# Patient Record
Sex: FEMALE | Race: BLACK OR AFRICAN AMERICAN | Employment: OTHER | URBAN - METROPOLITAN AREA
[De-identification: names, ages, dates, MRNs, and addresses within clinical notes are randomized per-mention and may not be internally consistent; named-entity substitution may affect disease eponyms.]

---

## 2018-09-23 ENCOUNTER — ANESTHESIA EVENT (OUTPATIENT)
Dept: OPERATING ROOM | Age: 68
DRG: 573 | End: 2018-09-23
Payer: MEDICARE

## 2018-09-23 ENCOUNTER — APPOINTMENT (OUTPATIENT)
Dept: GENERAL RADIOLOGY | Age: 68
DRG: 573 | End: 2018-09-23
Payer: MEDICARE

## 2018-09-23 ENCOUNTER — APPOINTMENT (OUTPATIENT)
Dept: ULTRASOUND IMAGING | Age: 68
DRG: 573 | End: 2018-09-23
Payer: MEDICARE

## 2018-09-23 ENCOUNTER — HOSPITAL ENCOUNTER (INPATIENT)
Age: 68
LOS: 12 days | Discharge: HOME HEALTH | DRG: 573 | End: 2018-10-05
Attending: EMERGENCY MEDICINE | Admitting: INTERNAL MEDICINE
Payer: MEDICARE

## 2018-09-23 DIAGNOSIS — E66.01 MORBID OBESITY (HCC): Chronic | ICD-10-CM

## 2018-09-23 DIAGNOSIS — E87.5 HYPERKALEMIA: ICD-10-CM

## 2018-09-23 DIAGNOSIS — S81.809A: ICD-10-CM

## 2018-09-23 DIAGNOSIS — D64.9 ANEMIA, UNSPECIFIED TYPE: ICD-10-CM

## 2018-09-23 DIAGNOSIS — M79.605 BILATERAL LEG PAIN: Primary | ICD-10-CM

## 2018-09-23 DIAGNOSIS — N17.9 AKI (ACUTE KIDNEY INJURY) (HCC): ICD-10-CM

## 2018-09-23 DIAGNOSIS — N17.9 ACUTE RENAL FAILURE, UNSPECIFIED ACUTE RENAL FAILURE TYPE (HCC): ICD-10-CM

## 2018-09-23 DIAGNOSIS — M79.604 BILATERAL LEG PAIN: Primary | ICD-10-CM

## 2018-09-23 DIAGNOSIS — K59.01 SLOW TRANSIT CONSTIPATION: ICD-10-CM

## 2018-09-23 PROBLEM — D50.0 BLOOD LOSS ANEMIA: Status: ACTIVE | Noted: 2018-09-23

## 2018-09-23 LAB
-: ABNORMAL
ABSOLUTE EOS #: 0 K/UL (ref 0–0.4)
ABSOLUTE EOS #: 0 K/UL (ref 0–0.4)
ABSOLUTE IMMATURE GRANULOCYTE: 0 K/UL (ref 0–0.3)
ABSOLUTE IMMATURE GRANULOCYTE: 0.25 K/UL (ref 0–0.3)
ABSOLUTE LYMPH #: 1 K/UL (ref 1–4.8)
ABSOLUTE LYMPH #: 1.6 K/UL (ref 1–4.8)
ABSOLUTE MONO #: 1.26 K/UL (ref 0.1–0.8)
ABSOLUTE MONO #: 1.34 K/UL (ref 0.1–0.8)
ALBUMIN SERPL-MCNC: 2.3 G/DL (ref 3.5–5.2)
ALBUMIN/GLOBULIN RATIO: 0.5 (ref 1–2.5)
ALP BLD-CCNC: 86 U/L (ref 35–104)
ALT SERPL-CCNC: 7 U/L (ref 5–33)
AMORPHOUS: ABNORMAL
ANION GAP SERPL CALCULATED.3IONS-SCNC: 12 MMOL/L (ref 9–17)
ANION GAP SERPL CALCULATED.3IONS-SCNC: 14 MMOL/L (ref 9–17)
ANION GAP SERPL CALCULATED.3IONS-SCNC: 15 MMOL/L (ref 9–17)
AST SERPL-CCNC: 15 U/L
BACTERIA: ABNORMAL
BASOPHILS # BLD: 0 % (ref 0–2)
BASOPHILS # BLD: 1 % (ref 0–2)
BASOPHILS ABSOLUTE: 0 K/UL (ref 0–0.2)
BASOPHILS ABSOLUTE: 0.27 K/UL (ref 0–0.2)
BILIRUB SERPL-MCNC: 0.16 MG/DL (ref 0.3–1.2)
BILIRUBIN URINE: NEGATIVE
BUN BLDV-MCNC: 54 MG/DL (ref 8–23)
BUN BLDV-MCNC: 58 MG/DL (ref 8–23)
BUN BLDV-MCNC: 62 MG/DL (ref 8–23)
BUN/CREAT BLD: ABNORMAL (ref 9–20)
C-REACTIVE PROTEIN: 62.6 MG/L (ref 0–5)
CALCIUM SERPL-MCNC: 7.7 MG/DL (ref 8.6–10.4)
CALCIUM SERPL-MCNC: 7.8 MG/DL (ref 8.6–10.4)
CALCIUM SERPL-MCNC: 8.1 MG/DL (ref 8.6–10.4)
CASTS UA: ABNORMAL /LPF (ref 0–2)
CHLORIDE BLD-SCNC: 101 MMOL/L (ref 98–107)
CHLORIDE BLD-SCNC: 96 MMOL/L (ref 98–107)
CHLORIDE BLD-SCNC: 98 MMOL/L (ref 98–107)
CO2: 14 MMOL/L (ref 20–31)
CO2: 16 MMOL/L (ref 20–31)
CO2: 18 MMOL/L (ref 20–31)
COLOR: YELLOW
COMPLEMENT C3: 124 MG/DL (ref 90–180)
COMPLEMENT C4: 19 MG/DL (ref 10–40)
CORTISOL COLLECTION INFO: ABNORMAL
CORTISOL: 20.5 UG/DL (ref 2.7–18.4)
CREAT SERPL-MCNC: 2.51 MG/DL (ref 0.5–0.9)
CREAT SERPL-MCNC: 3.03 MG/DL (ref 0.5–0.9)
CREAT SERPL-MCNC: 3.34 MG/DL (ref 0.5–0.9)
CREATININE URINE: 213.9 MG/DL (ref 28–217)
CRYSTALS, UA: ABNORMAL /HPF
DIFFERENTIAL TYPE: ABNORMAL
DIFFERENTIAL TYPE: ABNORMAL
EOSINOPHIL,URINE: NORMAL
EOSINOPHILS RELATIVE PERCENT: 0 % (ref 1–4)
EOSINOPHILS RELATIVE PERCENT: 0 % (ref 1–4)
EPITHELIAL CELLS UA: ABNORMAL /HPF (ref 0–5)
FERRITIN: 24 UG/L (ref 13–150)
FERRITIN: 32 UG/L (ref 13–150)
FREE KAPPA/LAMBDA RATIO: 1.29 (ref 0.26–1.65)
GFR AFRICAN AMERICAN: 17 ML/MIN
GFR AFRICAN AMERICAN: 18 ML/MIN
GFR AFRICAN AMERICAN: 23 ML/MIN
GFR NON-AFRICAN AMERICAN: 14 ML/MIN
GFR NON-AFRICAN AMERICAN: 15 ML/MIN
GFR NON-AFRICAN AMERICAN: 19 ML/MIN
GFR SERPL CREATININE-BSD FRML MDRD: ABNORMAL ML/MIN/{1.73_M2}
GLUCOSE BLD-MCNC: 113 MG/DL (ref 70–99)
GLUCOSE BLD-MCNC: 141 MG/DL (ref 70–99)
GLUCOSE BLD-MCNC: 81 MG/DL (ref 70–99)
GLUCOSE BLD-MCNC: 90 MG/DL (ref 65–105)
GLUCOSE URINE: NEGATIVE
HAV IGM SER IA-ACNC: ABNORMAL
HCT VFR BLD CALC: 19.6 % (ref 36.3–47.1)
HCT VFR BLD CALC: 21.1 % (ref 36.3–47.1)
HCT VFR BLD CALC: 23.1 % (ref 36.3–47.1)
HCT VFR BLD CALC: 23.1 % (ref 36.3–47.1)
HEMOGLOBIN: 5.4 G/DL (ref 11.9–15.1)
HEMOGLOBIN: 5.7 G/DL (ref 11.9–15.1)
HEMOGLOBIN: 6.5 G/DL (ref 11.9–15.1)
HEMOGLOBIN: 6.6 G/DL (ref 11.9–15.1)
HEPATITIS B CORE IGM ANTIBODY: NONREACTIVE
HEPATITIS B SURFACE ANTIGEN: NONREACTIVE
HEPATITIS C ANTIBODY: NONREACTIVE
IMMATURE GRANULOCYTES: 0 %
IMMATURE GRANULOCYTES: 1 %
INR BLD: 1
IRON SATURATION: 12 % (ref 20–55)
IRON SATURATION: 6 % (ref 20–55)
IRON: 14 UG/DL (ref 37–145)
IRON: 28 UG/DL (ref 37–145)
KAPPA FREE LIGHT CHAINS QNT: 15.81 MG/DL (ref 0.37–1.94)
KETONES, URINE: ABNORMAL
LACTIC ACID, WHOLE BLOOD: 3.1 MMOL/L (ref 0.7–2.1)
LAMBDA FREE LIGHT CHAINS QNT: 12.24 MG/DL (ref 0.57–2.63)
LEUKOCYTE ESTERASE, URINE: ABNORMAL
LYMPHOCYTES # BLD: 4 % (ref 24–44)
LYMPHOCYTES # BLD: 6 % (ref 24–44)
MCH RBC QN AUTO: 19.7 PG (ref 25.2–33.5)
MCH RBC QN AUTO: 22 PG (ref 25.2–33.5)
MCHC RBC AUTO-ENTMCNC: 27 G/DL (ref 28.4–34.8)
MCHC RBC AUTO-ENTMCNC: 28.1 G/DL (ref 28.4–34.8)
MCV RBC AUTO: 73 FL (ref 82.6–102.9)
MCV RBC AUTO: 78.3 FL (ref 82.6–102.9)
MONOCYTES # BLD: 5 % (ref 1–7)
MONOCYTES # BLD: 5 % (ref 1–7)
MORPHOLOGY: ABNORMAL
MORPHOLOGY: ABNORMAL
MRSA, DNA, NASAL: NORMAL
MUCUS: ABNORMAL
MYOGLOBIN: 2388 NG/ML (ref 25–58)
NITRITE, URINE: NEGATIVE
NRBC AUTOMATED: 0 PER 100 WBC
NRBC AUTOMATED: 0 PER 100 WBC
OSMOLALITY URINE: 485 MOSM/KG (ref 80–1300)
OTHER OBSERVATIONS UA: ABNORMAL
PDW BLD-RTO: 20.5 % (ref 11.8–14.4)
PDW BLD-RTO: 21.5 % (ref 11.8–14.4)
PH UA: 5 (ref 5–8)
PLATELET # BLD: 444 K/UL (ref 138–453)
PLATELET # BLD: ABNORMAL K/UL (ref 138–453)
PLATELET ESTIMATE: ABNORMAL
PLATELET ESTIMATE: ABNORMAL
PLATELET, FLUORESCENCE: 220 K/UL (ref 138–453)
PLATELET, IMMATURE FRACTION: 4.4 % (ref 1.1–10.3)
PMV BLD AUTO: 9.6 FL (ref 8.1–13.5)
PMV BLD AUTO: ABNORMAL FL (ref 8.1–13.5)
POTASSIUM SERPL-SCNC: 5.5 MMOL/L (ref 3.7–5.3)
POTASSIUM SERPL-SCNC: 6.4 MMOL/L (ref 3.7–5.3)
POTASSIUM SERPL-SCNC: 6.8 MMOL/L (ref 3.7–5.3)
PROTEIN UA: NEGATIVE
PROTHROMBIN TIME: 10.8 SEC (ref 9–12)
RBC # BLD: 2.89 M/UL (ref 3.95–5.11)
RBC # BLD: 2.95 M/UL (ref 3.95–5.11)
RBC # BLD: ABNORMAL 10*6/UL
RBC # BLD: ABNORMAL 10*6/UL
RBC UA: ABNORMAL /HPF (ref 0–2)
RENAL EPITHELIAL, UA: ABNORMAL /HPF
SEG NEUTROPHILS: 88 % (ref 36–66)
SEG NEUTROPHILS: 90 % (ref 36–66)
SEGMENTED NEUTROPHILS ABSOLUTE COUNT: 22.59 K/UL (ref 1.8–7.7)
SEGMENTED NEUTROPHILS ABSOLUTE COUNT: 23.49 K/UL (ref 1.8–7.7)
SERUM OSMOLALITY: 302 MOSM/KG (ref 275–295)
SODIUM BLD-SCNC: 124 MMOL/L (ref 135–144)
SODIUM BLD-SCNC: 130 MMOL/L (ref 135–144)
SODIUM BLD-SCNC: 130 MMOL/L (ref 135–144)
SODIUM BLD-SCNC: 134 MMOL/L (ref 135–144)
SODIUM,UR: <20 MMOL/L
SPECIFIC GRAVITY UA: 1.02 (ref 1–1.03)
SPECIMEN DESCRIPTION: NORMAL
THYROXINE, FREE: 0.92 NG/DL (ref 0.93–1.7)
TOTAL CK: 307 U/L (ref 26–192)
TOTAL IRON BINDING CAPACITY: 228 UG/DL (ref 250–450)
TOTAL IRON BINDING CAPACITY: 229 UG/DL (ref 250–450)
TOTAL PROTEIN, URINE: 19 MG/DL
TOTAL PROTEIN: 6.9 G/DL (ref 6.4–8.3)
TRICHOMONAS: ABNORMAL
TROPONIN INTERP: NORMAL
TROPONIN INTERP: NORMAL
TROPONIN T: <0.03 NG/ML
TROPONIN T: <0.03 NG/ML
TSH SERPL DL<=0.05 MIU/L-ACNC: 5.53 MIU/L (ref 0.3–5)
TURBIDITY: CLEAR
UNSATURATED IRON BINDING CAPACITY: 201 UG/DL (ref 112–347)
UNSATURATED IRON BINDING CAPACITY: 214 UG/DL (ref 112–347)
URINE HGB: NEGATIVE
UROBILINOGEN, URINE: NORMAL
WBC # BLD: 25.1 K/UL (ref 3.5–11.3)
WBC # BLD: 26.7 K/UL (ref 3.5–11.3)
WBC # BLD: ABNORMAL 10*3/UL
WBC # BLD: ABNORMAL 10*3/UL
WBC UA: ABNORMAL /HPF (ref 0–5)
YEAST: ABNORMAL

## 2018-09-23 PROCEDURE — 2580000003 HC RX 258: Performed by: INTERNAL MEDICINE

## 2018-09-23 PROCEDURE — 93005 ELECTROCARDIOGRAM TRACING: CPT

## 2018-09-23 PROCEDURE — 99223 1ST HOSP IP/OBS HIGH 75: CPT | Performed by: INTERNAL MEDICINE

## 2018-09-23 PROCEDURE — 81001 URINALYSIS AUTO W/SCOPE: CPT

## 2018-09-23 PROCEDURE — 82570 ASSAY OF URINE CREATININE: CPT

## 2018-09-23 PROCEDURE — 6370000000 HC RX 637 (ALT 250 FOR IP): Performed by: STUDENT IN AN ORGANIZED HEALTH CARE EDUCATION/TRAINING PROGRAM

## 2018-09-23 PROCEDURE — 86140 C-REACTIVE PROTEIN: CPT

## 2018-09-23 PROCEDURE — 6360000002 HC RX W HCPCS

## 2018-09-23 PROCEDURE — 85014 HEMATOCRIT: CPT

## 2018-09-23 PROCEDURE — 82947 ASSAY GLUCOSE BLOOD QUANT: CPT

## 2018-09-23 PROCEDURE — 80053 COMPREHEN METABOLIC PANEL: CPT

## 2018-09-23 PROCEDURE — 99291 CRITICAL CARE FIRST HOUR: CPT | Performed by: INTERNAL MEDICINE

## 2018-09-23 PROCEDURE — 86850 RBC ANTIBODY SCREEN: CPT

## 2018-09-23 PROCEDURE — 86335 IMMUNFIX E-PHORSIS/URINE/CSF: CPT

## 2018-09-23 PROCEDURE — 2500000003 HC RX 250 WO HCPCS: Performed by: HOSPITALIST

## 2018-09-23 PROCEDURE — 2580000003 HC RX 258: Performed by: NURSE PRACTITIONER

## 2018-09-23 PROCEDURE — 2580000003 HC RX 258: Performed by: STUDENT IN AN ORGANIZED HEALTH CARE EDUCATION/TRAINING PROGRAM

## 2018-09-23 PROCEDURE — 96375 TX/PRO/DX INJ NEW DRUG ADDON: CPT

## 2018-09-23 PROCEDURE — 94762 N-INVAS EAR/PLS OXIMTRY CONT: CPT

## 2018-09-23 PROCEDURE — P9016 RBC LEUKOCYTES REDUCED: HCPCS

## 2018-09-23 PROCEDURE — 96374 THER/PROPH/DIAG INJ IV PUSH: CPT

## 2018-09-23 PROCEDURE — 82728 ASSAY OF FERRITIN: CPT

## 2018-09-23 PROCEDURE — 6360000002 HC RX W HCPCS: Performed by: NURSE PRACTITIONER

## 2018-09-23 PROCEDURE — 83605 ASSAY OF LACTIC ACID: CPT

## 2018-09-23 PROCEDURE — 73590 X-RAY EXAM OF LOWER LEG: CPT

## 2018-09-23 PROCEDURE — 83935 ASSAY OF URINE OSMOLALITY: CPT

## 2018-09-23 PROCEDURE — 36430 TRANSFUSION BLD/BLD COMPNT: CPT

## 2018-09-23 PROCEDURE — 87040 BLOOD CULTURE FOR BACTERIA: CPT

## 2018-09-23 PROCEDURE — 83930 ASSAY OF BLOOD OSMOLALITY: CPT

## 2018-09-23 PROCEDURE — 86900 BLOOD TYPING SEROLOGIC ABO: CPT

## 2018-09-23 PROCEDURE — 94760 N-INVAS EAR/PLS OXIMETRY 1: CPT

## 2018-09-23 PROCEDURE — 83883 ASSAY NEPHELOMETRY NOT SPEC: CPT

## 2018-09-23 PROCEDURE — 51702 INSERT TEMP BLADDER CATH: CPT

## 2018-09-23 PROCEDURE — 2000000000 HC ICU R&B

## 2018-09-23 PROCEDURE — 6360000002 HC RX W HCPCS: Performed by: HOSPITALIST

## 2018-09-23 PROCEDURE — 84484 ASSAY OF TROPONIN QUANT: CPT

## 2018-09-23 PROCEDURE — 80074 ACUTE HEPATITIS PANEL: CPT

## 2018-09-23 PROCEDURE — 82533 TOTAL CORTISOL: CPT

## 2018-09-23 PROCEDURE — 84443 ASSAY THYROID STIM HORMONE: CPT

## 2018-09-23 PROCEDURE — 84300 ASSAY OF URINE SODIUM: CPT

## 2018-09-23 PROCEDURE — 36415 COLL VENOUS BLD VENIPUNCTURE: CPT

## 2018-09-23 PROCEDURE — 71045 X-RAY EXAM CHEST 1 VIEW: CPT

## 2018-09-23 PROCEDURE — 86334 IMMUNOFIX E-PHORESIS SERUM: CPT

## 2018-09-23 PROCEDURE — 84156 ASSAY OF PROTEIN URINE: CPT

## 2018-09-23 PROCEDURE — 84439 ASSAY OF FREE THYROXINE: CPT

## 2018-09-23 PROCEDURE — 83516 IMMUNOASSAY NONANTIBODY: CPT

## 2018-09-23 PROCEDURE — 99222 1ST HOSP IP/OBS MODERATE 55: CPT | Performed by: INTERNAL MEDICINE

## 2018-09-23 PROCEDURE — 86901 BLOOD TYPING SEROLOGIC RH(D): CPT

## 2018-09-23 PROCEDURE — 6360000002 HC RX W HCPCS: Performed by: STUDENT IN AN ORGANIZED HEALTH CARE EDUCATION/TRAINING PROGRAM

## 2018-09-23 PROCEDURE — 99285 EMERGENCY DEPT VISIT HI MDM: CPT

## 2018-09-23 PROCEDURE — 83540 ASSAY OF IRON: CPT

## 2018-09-23 PROCEDURE — 85025 COMPLETE CBC W/AUTO DIFF WBC: CPT

## 2018-09-23 PROCEDURE — 87641 MR-STAPH DNA AMP PROBE: CPT

## 2018-09-23 PROCEDURE — 85610 PROTHROMBIN TIME: CPT

## 2018-09-23 PROCEDURE — 83874 ASSAY OF MYOGLOBIN: CPT

## 2018-09-23 PROCEDURE — 82550 ASSAY OF CK (CPK): CPT

## 2018-09-23 PROCEDURE — 83550 IRON BINDING TEST: CPT

## 2018-09-23 PROCEDURE — 6370000000 HC RX 637 (ALT 250 FOR IP): Performed by: INTERNAL MEDICINE

## 2018-09-23 PROCEDURE — C9113 INJ PANTOPRAZOLE SODIUM, VIA: HCPCS | Performed by: NURSE PRACTITIONER

## 2018-09-23 PROCEDURE — 76770 US EXAM ABDO BACK WALL COMP: CPT

## 2018-09-23 PROCEDURE — 86038 ANTINUCLEAR ANTIBODIES: CPT

## 2018-09-23 PROCEDURE — 86160 COMPLEMENT ANTIGEN: CPT

## 2018-09-23 PROCEDURE — 2580000003 HC RX 258: Performed by: HOSPITALIST

## 2018-09-23 PROCEDURE — 99222 1ST HOSP IP/OBS MODERATE 55: CPT | Performed by: SURGERY

## 2018-09-23 PROCEDURE — 80048 BASIC METABOLIC PNL TOTAL CA: CPT

## 2018-09-23 PROCEDURE — 86920 COMPATIBILITY TEST SPIN: CPT

## 2018-09-23 PROCEDURE — 2500000003 HC RX 250 WO HCPCS: Performed by: INTERNAL MEDICINE

## 2018-09-23 PROCEDURE — 85018 HEMOGLOBIN: CPT

## 2018-09-23 PROCEDURE — 85055 RETICULATED PLATELET ASSAY: CPT

## 2018-09-23 PROCEDURE — 84295 ASSAY OF SERUM SODIUM: CPT

## 2018-09-23 PROCEDURE — 87205 SMEAR GRAM STAIN: CPT

## 2018-09-23 RX ORDER — DEXTROSE MONOHYDRATE 25 G/50ML
25 INJECTION, SOLUTION INTRAVENOUS PRN
Status: DISCONTINUED | OUTPATIENT
Start: 2018-09-23 | End: 2018-10-05 | Stop reason: HOSPADM

## 2018-09-23 RX ORDER — SODIUM CHLORIDE 0.9 % (FLUSH) 0.9 %
10 SYRINGE (ML) INJECTION EVERY 12 HOURS
Status: DISCONTINUED | OUTPATIENT
Start: 2018-09-23 | End: 2018-09-23

## 2018-09-23 RX ORDER — PANTOPRAZOLE SODIUM 40 MG/10ML
40 INJECTION, POWDER, LYOPHILIZED, FOR SOLUTION INTRAVENOUS 2 TIMES DAILY
Status: DISCONTINUED | OUTPATIENT
Start: 2018-09-23 | End: 2018-10-05 | Stop reason: HOSPADM

## 2018-09-23 RX ORDER — DEXTROSE MONOHYDRATE 25 G/50ML
25 INJECTION, SOLUTION INTRAVENOUS ONCE
Status: DISCONTINUED | OUTPATIENT
Start: 2018-09-23 | End: 2018-09-23

## 2018-09-23 RX ORDER — DOCUSATE SODIUM 100 MG/1
100 CAPSULE, LIQUID FILLED ORAL 2 TIMES DAILY
Status: DISCONTINUED | OUTPATIENT
Start: 2018-09-23 | End: 2018-09-23

## 2018-09-23 RX ORDER — FENTANYL CITRATE 50 UG/ML
100 INJECTION, SOLUTION INTRAMUSCULAR; INTRAVENOUS
Status: DISCONTINUED | OUTPATIENT
Start: 2018-09-23 | End: 2018-09-24

## 2018-09-23 RX ORDER — 0.9 % SODIUM CHLORIDE 0.9 %
250 INTRAVENOUS SOLUTION INTRAVENOUS ONCE
Status: DISCONTINUED | OUTPATIENT
Start: 2018-09-23 | End: 2018-09-23

## 2018-09-23 RX ORDER — GABAPENTIN 300 MG/1
300 CAPSULE ORAL 3 TIMES DAILY
Status: DISCONTINUED | OUTPATIENT
Start: 2018-09-23 | End: 2018-09-23

## 2018-09-23 RX ORDER — DEXTROSE MONOHYDRATE 25 G/50ML
25 INJECTION, SOLUTION INTRAVENOUS ONCE
Status: COMPLETED | OUTPATIENT
Start: 2018-09-23 | End: 2018-09-23

## 2018-09-23 RX ORDER — SODIUM CHLORIDE 9 MG/ML
INJECTION, SOLUTION INTRAVENOUS CONTINUOUS
Status: DISCONTINUED | OUTPATIENT
Start: 2018-09-23 | End: 2018-09-23

## 2018-09-23 RX ORDER — ACETAMINOPHEN 325 MG/1
650 TABLET ORAL EVERY 4 HOURS PRN
Status: DISCONTINUED | OUTPATIENT
Start: 2018-09-23 | End: 2018-10-05 | Stop reason: HOSPADM

## 2018-09-23 RX ORDER — SODIUM CHLORIDE, SODIUM LACTATE, POTASSIUM CHLORIDE, AND CALCIUM CHLORIDE .6; .31; .03; .02 G/100ML; G/100ML; G/100ML; G/100ML
1000 INJECTION, SOLUTION INTRAVENOUS ONCE
Status: COMPLETED | OUTPATIENT
Start: 2018-09-23 | End: 2018-09-23

## 2018-09-23 RX ORDER — DEXTROSE MONOHYDRATE 50 MG/ML
100 INJECTION, SOLUTION INTRAVENOUS PRN
Status: DISCONTINUED | OUTPATIENT
Start: 2018-09-23 | End: 2018-10-05 | Stop reason: HOSPADM

## 2018-09-23 RX ORDER — SODIUM CHLORIDE 0.9 % (FLUSH) 0.9 %
10 SYRINGE (ML) INJECTION PRN
Status: DISCONTINUED | OUTPATIENT
Start: 2018-09-23 | End: 2018-09-23

## 2018-09-23 RX ORDER — 0.9 % SODIUM CHLORIDE 0.9 %
10 VIAL (ML) INJECTION DAILY
Status: DISCONTINUED | OUTPATIENT
Start: 2018-09-23 | End: 2018-10-05 | Stop reason: HOSPADM

## 2018-09-23 RX ORDER — MORPHINE SULFATE 4 MG/ML
4 INJECTION, SOLUTION INTRAMUSCULAR; INTRAVENOUS ONCE
Status: COMPLETED | OUTPATIENT
Start: 2018-09-23 | End: 2018-09-23

## 2018-09-23 RX ORDER — NIFEDIPINE 60 MG/1
60 TABLET, FILM COATED, EXTENDED RELEASE ORAL DAILY
Status: DISCONTINUED | OUTPATIENT
Start: 2018-09-23 | End: 2018-09-23

## 2018-09-23 RX ORDER — NICOTINE POLACRILEX 4 MG
15 LOZENGE BUCCAL PRN
Status: DISCONTINUED | OUTPATIENT
Start: 2018-09-23 | End: 2018-10-05 | Stop reason: HOSPADM

## 2018-09-23 RX ORDER — CALCIUM GLUCONATE 94 MG/ML
1 INJECTION, SOLUTION INTRAVENOUS ONCE
Status: DISCONTINUED | OUTPATIENT
Start: 2018-09-23 | End: 2018-10-05 | Stop reason: HOSPADM

## 2018-09-23 RX ORDER — DEXTROSE MONOHYDRATE 25 G/50ML
12.5 INJECTION, SOLUTION INTRAVENOUS PRN
Status: DISCONTINUED | OUTPATIENT
Start: 2018-09-23 | End: 2018-10-05 | Stop reason: HOSPADM

## 2018-09-23 RX ORDER — CALCIUM GLUCONATE 94 MG/ML
1 INJECTION, SOLUTION INTRAVENOUS ONCE
Status: COMPLETED | OUTPATIENT
Start: 2018-09-23 | End: 2018-09-23

## 2018-09-23 RX ORDER — SODIUM POLYSTYRENE SULFONATE 15 G/60ML
15 SUSPENSION ORAL; RECTAL
Status: DISCONTINUED | OUTPATIENT
Start: 2018-09-23 | End: 2018-09-23

## 2018-09-23 RX ORDER — MORPHINE SULFATE 4 MG/ML
INJECTION, SOLUTION INTRAMUSCULAR; INTRAVENOUS
Status: COMPLETED
Start: 2018-09-23 | End: 2018-09-23

## 2018-09-23 RX ORDER — CARVEDILOL 12.5 MG/1
12.5 TABLET ORAL 2 TIMES DAILY WITH MEALS
Status: DISCONTINUED | OUTPATIENT
Start: 2018-09-23 | End: 2018-09-23

## 2018-09-23 RX ORDER — NICOTINE 21 MG/24HR
1 PATCH, TRANSDERMAL 24 HOURS TRANSDERMAL DAILY PRN
Status: DISCONTINUED | OUTPATIENT
Start: 2018-09-23 | End: 2018-09-26

## 2018-09-23 RX ORDER — ONDANSETRON 2 MG/ML
4 INJECTION INTRAMUSCULAR; INTRAVENOUS EVERY 6 HOURS PRN
Status: DISCONTINUED | OUTPATIENT
Start: 2018-09-23 | End: 2018-10-05 | Stop reason: HOSPADM

## 2018-09-23 RX ORDER — FAMOTIDINE 20 MG/1
20 TABLET, FILM COATED ORAL DAILY
Status: DISCONTINUED | OUTPATIENT
Start: 2018-09-23 | End: 2018-09-23

## 2018-09-23 RX ORDER — FENTANYL CITRATE 50 UG/ML
50 INJECTION, SOLUTION INTRAMUSCULAR; INTRAVENOUS ONCE
Status: COMPLETED | OUTPATIENT
Start: 2018-09-23 | End: 2018-09-23

## 2018-09-23 RX ORDER — FENTANYL CITRATE 50 UG/ML
50 INJECTION, SOLUTION INTRAMUSCULAR; INTRAVENOUS
Status: DISCONTINUED | OUTPATIENT
Start: 2018-09-23 | End: 2018-09-24

## 2018-09-23 RX ORDER — SODIUM POLYSTYRENE SULFONATE 15 G/60ML
30 SUSPENSION ORAL; RECTAL
Status: DISCONTINUED | OUTPATIENT
Start: 2018-09-23 | End: 2018-09-23

## 2018-09-23 RX ORDER — NICOTINE POLACRILEX 4 MG
15 LOZENGE BUCCAL PRN
Status: DISCONTINUED | OUTPATIENT
Start: 2018-09-23 | End: 2018-09-23 | Stop reason: SDUPTHER

## 2018-09-23 RX ORDER — 0.9 % SODIUM CHLORIDE 0.9 %
250 INTRAVENOUS SOLUTION INTRAVENOUS ONCE
Status: COMPLETED | OUTPATIENT
Start: 2018-09-23 | End: 2018-09-24

## 2018-09-23 RX ORDER — ASCORBIC ACID 500 MG
500 TABLET ORAL DAILY
Status: DISCONTINUED | OUTPATIENT
Start: 2018-09-23 | End: 2018-10-05 | Stop reason: HOSPADM

## 2018-09-23 RX ORDER — CALCIUM GLUCONATE 94 MG/ML
1 INJECTION, SOLUTION INTRAVENOUS ONCE
Status: DISCONTINUED | OUTPATIENT
Start: 2018-09-23 | End: 2018-09-23

## 2018-09-23 RX ADMIN — SODIUM BICARBONATE: 84 INJECTION, SOLUTION INTRAVENOUS at 10:48

## 2018-09-23 RX ADMIN — SODIUM CHLORIDE 250 ML: 9 INJECTION, SOLUTION INTRAVENOUS at 21:46

## 2018-09-23 RX ADMIN — DEXTROSE MONOHYDRATE 25 G: 500 INJECTION PARENTERAL at 14:00

## 2018-09-23 RX ADMIN — FENTANYL CITRATE 100 MCG: 50 INJECTION, SOLUTION INTRAMUSCULAR; INTRAVENOUS at 23:33

## 2018-09-23 RX ADMIN — VANCOMYCIN HYDROCHLORIDE 1750 MG: 10 INJECTION, POWDER, LYOPHILIZED, FOR SOLUTION INTRAVENOUS at 07:00

## 2018-09-23 RX ADMIN — PIPERACILLIN AND TAZOBACTAM 3.38 G: 3; .375 INJECTION, POWDER, LYOPHILIZED, FOR SOLUTION INTRAVENOUS; PARENTERAL at 20:41

## 2018-09-23 RX ADMIN — MORPHINE SULFATE 4 MG: 4 INJECTION, SOLUTION INTRAMUSCULAR; INTRAVENOUS at 10:11

## 2018-09-23 RX ADMIN — Medication: at 12:59

## 2018-09-23 RX ADMIN — MORPHINE SULFATE 4 MG: 4 INJECTION INTRAVENOUS at 02:25

## 2018-09-23 RX ADMIN — FENTANYL CITRATE 100 MCG: 50 INJECTION, SOLUTION INTRAMUSCULAR; INTRAVENOUS at 12:44

## 2018-09-23 RX ADMIN — IRON SUCROSE 200 MG: 20 INJECTION, SOLUTION INTRAVENOUS at 23:16

## 2018-09-23 RX ADMIN — FENTANYL CITRATE 100 MCG: 50 INJECTION, SOLUTION INTRAMUSCULAR; INTRAVENOUS at 19:49

## 2018-09-23 RX ADMIN — SODIUM CHLORIDE 500 ML: 4.5 INJECTION, SOLUTION INTRAVENOUS at 13:02

## 2018-09-23 RX ADMIN — PANTOPRAZOLE SODIUM 40 MG: 40 INJECTION, POWDER, FOR SOLUTION INTRAVENOUS at 12:00

## 2018-09-23 RX ADMIN — CALCIUM GLUCONATE 1 G: 98 INJECTION, SOLUTION INTRAVENOUS at 06:16

## 2018-09-23 RX ADMIN — FENTANYL CITRATE 100 MCG: 50 INJECTION, SOLUTION INTRAMUSCULAR; INTRAVENOUS at 15:04

## 2018-09-23 RX ADMIN — INSULIN HUMAN 10 UNITS: 100 INJECTION, SOLUTION PARENTERAL at 14:01

## 2018-09-23 RX ADMIN — MORPHINE SULFATE 4 MG: 4 INJECTION, SOLUTION INTRAMUSCULAR; INTRAVENOUS at 10:14

## 2018-09-23 RX ADMIN — SODIUM CHLORIDE, POTASSIUM CHLORIDE, SODIUM LACTATE AND CALCIUM CHLORIDE 1000 ML: 600; 310; 30; 20 INJECTION, SOLUTION INTRAVENOUS at 05:05

## 2018-09-23 RX ADMIN — SODIUM BICARBONATE 50 MEQ: 84 INJECTION, SOLUTION INTRAVENOUS at 13:06

## 2018-09-23 RX ADMIN — FENTANYL CITRATE 100 MCG: 50 INJECTION, SOLUTION INTRAMUSCULAR; INTRAVENOUS at 21:54

## 2018-09-23 RX ADMIN — DEXTROSE MONOHYDRATE 25 G: 25 INJECTION, SOLUTION INTRAVENOUS at 05:30

## 2018-09-23 RX ADMIN — FENTANYL CITRATE 100 MCG: 50 INJECTION, SOLUTION INTRAMUSCULAR; INTRAVENOUS at 11:22

## 2018-09-23 RX ADMIN — PANTOPRAZOLE SODIUM 40 MG: 40 INJECTION, POWDER, FOR SOLUTION INTRAVENOUS at 20:41

## 2018-09-23 RX ADMIN — Medication: at 20:40

## 2018-09-23 RX ADMIN — INSULIN HUMAN 10 UNITS: 100 INJECTION, SOLUTION PARENTERAL at 23:11

## 2018-09-23 RX ADMIN — PIPERACILLIN AND TAZOBACTAM 3.38 G: 3; .375 INJECTION, POWDER, LYOPHILIZED, FOR SOLUTION INTRAVENOUS; PARENTERAL at 14:05

## 2018-09-23 RX ADMIN — Medication 10 ML: at 12:00

## 2018-09-23 RX ADMIN — Medication: at 12:34

## 2018-09-23 RX ADMIN — FENTANYL CITRATE 50 MCG: 50 INJECTION INTRAMUSCULAR; INTRAVENOUS at 03:47

## 2018-09-23 RX ADMIN — DEXTROSE MONOHYDRATE 25 G: 500 INJECTION PARENTERAL at 23:11

## 2018-09-23 RX ADMIN — INSULIN HUMAN 10 UNITS: 100 INJECTION, SOLUTION PARENTERAL at 05:29

## 2018-09-23 ASSESSMENT — ENCOUNTER SYMPTOMS
SHORTNESS OF BREATH: 0
ROS SKIN COMMENTS: BILATERAL LOWER EXTREMITY WOUNDS
NAUSEA: 0
EYE REDNESS: 0
EYE DISCHARGE: 0
ABDOMINAL PAIN: 0
BLOOD IN STOOL: 0
CHEST TIGHTNESS: 0
COLOR CHANGE: 0
VOMITING: 0

## 2018-09-23 ASSESSMENT — PAIN SCALES - GENERAL
PAINLEVEL_OUTOF10: 10
PAINLEVEL_OUTOF10: 8
PAINLEVEL_OUTOF10: 10
PAINLEVEL_OUTOF10: 10

## 2018-09-23 ASSESSMENT — PAIN DESCRIPTION - LOCATION
LOCATION: LEG
LOCATION: LEG

## 2018-09-23 ASSESSMENT — PAIN DESCRIPTION - ORIENTATION
ORIENTATION: LEFT;RIGHT;LOWER
ORIENTATION: RIGHT;LEFT;LOWER

## 2018-09-23 ASSESSMENT — PAIN DESCRIPTION - DESCRIPTORS
DESCRIPTORS: BURNING;SHARP
DESCRIPTORS: BURNING;SHARP

## 2018-09-23 ASSESSMENT — PAIN DESCRIPTION - PROGRESSION
CLINICAL_PROGRESSION: NOT CHANGED
CLINICAL_PROGRESSION: GRADUALLY WORSENING

## 2018-09-23 ASSESSMENT — PAIN DESCRIPTION - ONSET: ONSET: ON-GOING

## 2018-09-23 ASSESSMENT — PAIN DESCRIPTION - PAIN TYPE
TYPE: ACUTE PAIN;CHRONIC PAIN
TYPE: ACUTE PAIN;CHRONIC PAIN

## 2018-09-23 ASSESSMENT — PAIN DESCRIPTION - FREQUENCY
FREQUENCY: CONTINUOUS
FREQUENCY: CONTINUOUS

## 2018-09-23 NOTE — CONSULTS
Patient is . She lives with family. She occasionally  puffs a cigarette. She  used to smoke heavily before. TOBACCO:   reports that she has quit smoking. She has quit using smokeless tobacco.  ETOH:   reports that she does not drink alcohol. DRUGS:   reports that she does not use drugs. Environmental/chemical exposure:  Insect bites while working in yard couple months ago. Travel History:. No travel history. Family History:   Family History   Problem Relation Age of Onset    Adopted: Yes    Cancer Mother        REVIEW OF SYSTEMS:      Limited as he is quite drowsy after Fentanyl. No fever but she did have some chills.  + Generalized weakness and nausea but no vomiting or abdominal pain. No report of headache, neck pain or chest pain. No vaginal discharge or any urinary complaints. She now has a Keyes catheter. PHYSICAL EXAM:      Vitals:      Patient Vitals for the past 24 hrs:   BP Temp Temp src Pulse Resp SpO2 Height Weight   09/23/18 0900 (!) 142/28 - - 81 22 100 % - -   09/23/18 0848 (!) 127/38 - - 83 20 - 5' 3.5\" (1.613 m) (!) 306 lb 3.5 oz (138.9 kg)   09/23/18 0845 (!) 78/55 - - 79 20 100 % - -   09/23/18 0830 (!) 118/33 - - 75 20 90 % - -   09/23/18 0815 - - - - - 99 % - -   09/23/18 0808 (!) 124/32 97.6 °F (36.4 °C) Oral 84 23 100 % 5' 3.5\" (1.613 m) (!) 307 lb 15.7 oz (139.7 kg)   09/23/18 0717 (!) 148/44 98 °F (36.7 °C) - 90 21 100 % - -   09/23/18 0713 (!) 147/35 98 °F (36.7 °C) - 86 21 - - -   09/23/18 0531 (!) 169/39 - - 86 20 - - -   09/23/18 0457 - - - - - - - 270 lb (122.5 kg)   09/23/18 0406 (!) 143/36 - - 79 24 99 % - -   09/23/18 0256 107/78 - - 82 - - - -   09/23/18 0146 (!) 106/59 - - 96 - - - -   09/23/18 0143 (!) 127/43 - - - - - - -   09/23/18 0136 - - - 99 20 100 % - -   09/23/18 0131 - 97.7 °F (36.5 °C) Oral - - - - -         General appearance:Quite lethargic, elderly female. She just received  Fentanyl. She is hemodynamically stable.   Granddaughter and

## 2018-09-23 NOTE — ED TRIAGE NOTES
Pt presents to ED w/ CO bilat leg wounds. Leg wounds are on lower legs, appear extremely infected with pus and blood oozing from legs. Pt states this has been going on for a month, states pain is extreme, and she has not been seen by any doctors. Pt has been putting peroxide on her wounds and taking motrin. Pt alert and oriented x4. Pt has random, intermittent, shking fits that she states are from the pain. Family states she does this at home as well.

## 2018-09-23 NOTE — H&P
recommendations, check renal ultrasound  6. Lactic acidosis, we will monitor the trend  7. She was started on vancomycin and Zosyn in the emergency department, we will keep her on that and follow cultures, closely monitoring of Vanco trough with acute kidney injury  8. GI evaluation will also be obtained  9. Given the patient's current condition, discussed with ICU resident and we will transfer the patient to the ICU. Plan of care discussed with ICU resident. Consultations:   IP CONSULT TO HOSPITALIST  IP CONSULT TO INFECTIOUS DISEASES  IP CONSULT TO NEPHROLOGY  PHARMACY TO DOSE VANCOMYCIN  IP CONSULT TO VASCULAR SURGERY     Patient is admitted as inpatient status because of co-morbidities listed above, severity of signs and symptoms as outlined, requirement for current medical therapies and most importantly because of direct risk to patient if care not provided in a hospital setting. Pedro Winn MD  9/23/2018  9:14 AM    Copy sent to Dr. Shine primary care provider on file.

## 2018-09-23 NOTE — CONSULTS
[facility]  Consultation Note     . REASON FOR CONSULTATION:    Hematachezia secondary to long term NSAID use  Melena, anemia    HISTORY OF PRESENT ILLNESS:     This is a 77 y.o. female who presents with non-heaing BLE wounds secondary to insect bites that have been present for months. Pt is a very poor/unreliable historian stating she has 2 names and 2 dates of birth. She states she does not have a pcp or receive regular medical care. Pt states that she has been taking 6 Aleve daily for years for pain, has had hematochezia and melena intermittently \"for a long time\"    Pt c/o dull, constant epigastric pain with no alleviating or aggravating factors. Pt states that she use to weight over 600lbs, and over the last few months has had a decrease in appetite and lost an unknown amount of weight. Upon arrival, her Hgb was significantly low at 5.7 and 5.4-she is currently receiving blood and is started on a bicarb drip. Due to limited IV access at the time, a PPI drip cannot be started-so IV push will be given. Nephrology was consulted for SAM with possible hemo to be started. GS was consulted to manage BLE wounds which may require surgical debridement. IV antibiotics were initiated. PAST MEDICAL HISTORY:  Past Medical History:   Diagnosis Date    SAM (acute kidney injury) ([facility] [address] 9/23/2018    Heart attack ([facility] [address] 1982    Skin lesions     bilateral legs     Past Surgical History:   Procedure Laterality Date    TUBAL LIGATION  1974       ALLERGIES:  Allergies   Allergen Reactions    Fruit & Vegetable Daily [Nutritional Supplements] Anaphylaxis     After ingesting nectarines    Aspirin Nausea And Vomiting       HOME MEDICATIONS:  Prior to Admission medications    Medication Sig Start Date End Date Taking?  Authorizing Provider   Ascorbic Acid (VITAMIN C) 500 MG tablet Take 1 tablet by mouth daily 5/11/15   Peter Leiva MD   gabapentin (NEURONTIN) 300 MG capsule Take 1 since admission. Hgb upon arrival was 5.7 and 5.4-pt not in distress    -Once stable, Pt will need EGD and colonoscopy-appropriate time to be determined  -Rec PPI drip once access/compatibility is obtained. Until then PPI IV BID  -Monitor for s/s of GI blood loss.    -Trend Hgb and transfuse to maintain Hgb>7    This plan was formulated in collaboration with Dr. Trang Nieves MD    Electronically signed by:  Cisco Carrillo CNP, [facility]  [phone]  9/23/2018    11:01 AM

## 2018-09-24 ENCOUNTER — ANESTHESIA (OUTPATIENT)
Dept: OPERATING ROOM | Age: 68
DRG: 573 | End: 2018-09-24
Payer: MEDICARE

## 2018-09-24 ENCOUNTER — APPOINTMENT (OUTPATIENT)
Dept: ULTRASOUND IMAGING | Age: 68
DRG: 573 | End: 2018-09-24
Payer: MEDICARE

## 2018-09-24 VITALS — SYSTOLIC BLOOD PRESSURE: 140 MMHG | TEMPERATURE: 95.8 F | OXYGEN SATURATION: 100 % | DIASTOLIC BLOOD PRESSURE: 64 MMHG

## 2018-09-24 PROBLEM — E83.51 HYPOCALCEMIA: Status: ACTIVE | Noted: 2018-09-24

## 2018-09-24 PROBLEM — E88.09 HYPOALBUMINEMIA: Status: ACTIVE | Noted: 2018-09-24

## 2018-09-24 LAB
ABSOLUTE EOS #: 0.21 K/UL (ref 0–0.4)
ABSOLUTE IMMATURE GRANULOCYTE: 0 K/UL (ref 0–0.3)
ABSOLUTE LYMPH #: 0.83 K/UL (ref 1–4.8)
ABSOLUTE MONO #: 1.66 K/UL (ref 0.1–0.8)
ALBUMIN SERPL-MCNC: 2.3 G/DL (ref 3.5–5.2)
ALBUMIN/GLOBULIN RATIO: 0.5 (ref 1–2.5)
ALP BLD-CCNC: 80 U/L (ref 35–104)
ALT SERPL-CCNC: 18 U/L (ref 5–33)
ANION GAP SERPL CALCULATED.3IONS-SCNC: 10 MMOL/L (ref 9–17)
ANTI-NUCLEAR ANTIBODY (ANA): NEGATIVE
AST SERPL-CCNC: 85 U/L
BASOPHILS # BLD: 0 % (ref 0–2)
BASOPHILS ABSOLUTE: 0 K/UL (ref 0–0.2)
BILIRUB SERPL-MCNC: 0.5 MG/DL (ref 0.3–1.2)
BUN BLDV-MCNC: 45 MG/DL (ref 8–23)
BUN/CREAT BLD: ABNORMAL (ref 9–20)
CALCIUM IONIZED: 1.02 MMOL/L (ref 1.13–1.33)
CALCIUM SERPL-MCNC: 8 MG/DL (ref 8.6–10.4)
CHLORIDE BLD-SCNC: 104 MMOL/L (ref 98–107)
CO2: 21 MMOL/L (ref 20–31)
CREAT SERPL-MCNC: 2.3 MG/DL (ref 0.5–0.9)
DIFFERENTIAL TYPE: ABNORMAL
EKG ATRIAL RATE: 80 BPM
EKG ATRIAL RATE: 81 BPM
EKG ATRIAL RATE: 84 BPM
EKG ATRIAL RATE: 96 BPM
EKG P AXIS: 36 DEGREES
EKG P AXIS: 41 DEGREES
EKG P AXIS: 41 DEGREES
EKG P AXIS: 48 DEGREES
EKG P-R INTERVAL: 142 MS
EKG P-R INTERVAL: 144 MS
EKG P-R INTERVAL: 148 MS
EKG P-R INTERVAL: 154 MS
EKG Q-T INTERVAL: 350 MS
EKG Q-T INTERVAL: 374 MS
EKG Q-T INTERVAL: 376 MS
EKG Q-T INTERVAL: 378 MS
EKG QRS DURATION: 86 MS
EKG QRS DURATION: 86 MS
EKG QRS DURATION: 88 MS
EKG QRS DURATION: 90 MS
EKG QTC CALCULATION (BAZETT): 434 MS
EKG QTC CALCULATION (BAZETT): 435 MS
EKG QTC CALCULATION (BAZETT): 442 MS
EKG QTC CALCULATION (BAZETT): 444 MS
EKG R AXIS: -10 DEGREES
EKG R AXIS: -14 DEGREES
EKG R AXIS: -15 DEGREES
EKG R AXIS: -9 DEGREES
EKG T AXIS: 153 DEGREES
EKG T AXIS: 155 DEGREES
EKG T AXIS: 157 DEGREES
EKG T AXIS: 161 DEGREES
EKG VENTRICULAR RATE: 80 BPM
EKG VENTRICULAR RATE: 81 BPM
EKG VENTRICULAR RATE: 84 BPM
EKG VENTRICULAR RATE: 96 BPM
EOSINOPHILS RELATIVE PERCENT: 1 % (ref 1–4)
ESTIMATED AVERAGE GLUCOSE: 114 MG/DL
GFR AFRICAN AMERICAN: 25 ML/MIN
GFR NON-AFRICAN AMERICAN: 21 ML/MIN
GFR SERPL CREATININE-BSD FRML MDRD: ABNORMAL ML/MIN/{1.73_M2}
GFR SERPL CREATININE-BSD FRML MDRD: ABNORMAL ML/MIN/{1.73_M2}
GLUCOSE BLD-MCNC: 94 MG/DL (ref 70–99)
HBA1C MFR BLD: 5.6 % (ref 4–6)
HCT VFR BLD CALC: 23.8 % (ref 36.3–47.1)
HCT VFR BLD CALC: 25.5 % (ref 36.3–47.1)
HCT VFR BLD CALC: 29 % (ref 36.3–47.1)
HEMOGLOBIN: 7.1 G/DL (ref 11.9–15.1)
HEMOGLOBIN: 7.7 G/DL (ref 11.9–15.1)
HEMOGLOBIN: 8.5 G/DL (ref 11.9–15.1)
IMMATURE GRANULOCYTES: 0 %
INR BLD: 1
LACTIC ACID, WHOLE BLOOD: 2.1 MMOL/L (ref 0.7–2.1)
LACTIC ACID: NORMAL MMOL/L
LV EF: 65 %
LVEF MODALITY: NORMAL
LYMPHOCYTES # BLD: 4 % (ref 24–44)
MCH RBC QN AUTO: 23 PG (ref 25.2–33.5)
MCHC RBC AUTO-ENTMCNC: 30.2 G/DL (ref 28.4–34.8)
MCV RBC AUTO: 76.1 FL (ref 82.6–102.9)
MONOCYTES # BLD: 8 % (ref 1–7)
MORPHOLOGY: ABNORMAL
NRBC AUTOMATED: 0.1 PER 100 WBC
PATHOLOGIST: NORMAL
PDW BLD-RTO: 20.8 % (ref 11.8–14.4)
PHOSPHORUS: 3.8 MG/DL (ref 2.6–4.5)
PLATELET # BLD: 355 K/UL (ref 138–453)
PLATELET ESTIMATE: ABNORMAL
PMV BLD AUTO: 9.2 FL (ref 8.1–13.5)
POTASSIUM SERPL-SCNC: 5.3 MMOL/L (ref 3.7–5.3)
PROTHROMBIN TIME: 10.9 SEC (ref 9–12)
RBC # BLD: 3.35 M/UL (ref 3.95–5.11)
RBC # BLD: ABNORMAL 10*6/UL
SEG NEUTROPHILS: 87 % (ref 36–66)
SEGMENTED NEUTROPHILS ABSOLUTE COUNT: 18 K/UL (ref 1.8–7.7)
SERUM IFX INTERP: NORMAL
SODIUM BLD-SCNC: 134 MMOL/L (ref 135–144)
SODIUM BLD-SCNC: 135 MMOL/L (ref 135–144)
SODIUM BLD-SCNC: 135 MMOL/L (ref 135–144)
SODIUM BLD-SCNC: 138 MMOL/L (ref 135–144)
TOTAL PROTEIN: 6.6 G/DL (ref 6.4–8.3)
TROPONIN INTERP: NORMAL
TROPONIN T: <0.03 NG/ML
URINE IFX INTERP: NORMAL
URINE IFX SPECIMEN: NORMAL
URINE TOTAL PROTEIN: 19 MG/DL
VANCOMYCIN RANDOM DATE LAST DOSE: NORMAL
VANCOMYCIN RANDOM DOSE AMOUNT: NORMAL
VANCOMYCIN RANDOM TIME LAST DOSE: NORMAL
VANCOMYCIN RANDOM: 11.3 UG/ML
VOLUME: NORMAL ML
WBC # BLD: 20.7 K/UL (ref 3.5–11.3)
WBC # BLD: ABNORMAL 10*3/UL

## 2018-09-24 PROCEDURE — 2580000003 HC RX 258: Performed by: INTERNAL MEDICINE

## 2018-09-24 PROCEDURE — 2580000003 HC RX 258: Performed by: NURSE PRACTITIONER

## 2018-09-24 PROCEDURE — 6360000002 HC RX W HCPCS: Performed by: ANESTHESIOLOGY

## 2018-09-24 PROCEDURE — 6360000002 HC RX W HCPCS: Performed by: NURSE PRACTITIONER

## 2018-09-24 PROCEDURE — 99232 SBSQ HOSP IP/OBS MODERATE 35: CPT | Performed by: INTERNAL MEDICINE

## 2018-09-24 PROCEDURE — 2580000003 HC RX 258: Performed by: SURGERY

## 2018-09-24 PROCEDURE — 2500000003 HC RX 250 WO HCPCS: Performed by: SURGERY

## 2018-09-24 PROCEDURE — 82330 ASSAY OF CALCIUM: CPT

## 2018-09-24 PROCEDURE — 83036 HEMOGLOBIN GLYCOSYLATED A1C: CPT

## 2018-09-24 PROCEDURE — 2500000003 HC RX 250 WO HCPCS: Performed by: INTERNAL MEDICINE

## 2018-09-24 PROCEDURE — 2500000003 HC RX 250 WO HCPCS: Performed by: NURSE ANESTHETIST, CERTIFIED REGISTERED

## 2018-09-24 PROCEDURE — 2580000003 HC RX 258: Performed by: STUDENT IN AN ORGANIZED HEALTH CARE EDUCATION/TRAINING PROGRAM

## 2018-09-24 PROCEDURE — 84100 ASSAY OF PHOSPHORUS: CPT

## 2018-09-24 PROCEDURE — 0HRKXK3 REPLACEMENT OF RIGHT LOWER LEG SKIN WITH NONAUTOLOGOUS TISSUE SUBSTITUTE, FULL THICKNESS, EXTERNAL APPROACH: ICD-10-PCS | Performed by: SURGERY

## 2018-09-24 PROCEDURE — 6360000002 HC RX W HCPCS: Performed by: NURSE ANESTHETIST, CERTIFIED REGISTERED

## 2018-09-24 PROCEDURE — 84484 ASSAY OF TROPONIN QUANT: CPT

## 2018-09-24 PROCEDURE — 3700000000 HC ANESTHESIA ATTENDED CARE: Performed by: SURGERY

## 2018-09-24 PROCEDURE — 94762 N-INVAS EAR/PLS OXIMTRY CONT: CPT

## 2018-09-24 PROCEDURE — 99233 SBSQ HOSP IP/OBS HIGH 50: CPT | Performed by: INTERNAL MEDICINE

## 2018-09-24 PROCEDURE — 85610 PROTHROMBIN TIME: CPT

## 2018-09-24 PROCEDURE — 84295 ASSAY OF SERUM SODIUM: CPT

## 2018-09-24 PROCEDURE — 76705 ECHO EXAM OF ABDOMEN: CPT

## 2018-09-24 PROCEDURE — 7100000000 HC PACU RECOVERY - FIRST 15 MIN: Performed by: SURGERY

## 2018-09-24 PROCEDURE — 2580000003 HC RX 258: Performed by: NURSE ANESTHETIST, CERTIFIED REGISTERED

## 2018-09-24 PROCEDURE — 6370000000 HC RX 637 (ALT 250 FOR IP): Performed by: STUDENT IN AN ORGANIZED HEALTH CARE EDUCATION/TRAINING PROGRAM

## 2018-09-24 PROCEDURE — 0JBP0ZZ EXCISION OF LEFT LOWER LEG SUBCUTANEOUS TISSUE AND FASCIA, OPEN APPROACH: ICD-10-PCS | Performed by: SURGERY

## 2018-09-24 PROCEDURE — 6370000000 HC RX 637 (ALT 250 FOR IP): Performed by: INTERNAL MEDICINE

## 2018-09-24 PROCEDURE — 93005 ELECTROCARDIOGRAM TRACING: CPT

## 2018-09-24 PROCEDURE — 7100000001 HC PACU RECOVERY - ADDTL 15 MIN: Performed by: SURGERY

## 2018-09-24 PROCEDURE — 83605 ASSAY OF LACTIC ACID: CPT

## 2018-09-24 PROCEDURE — 86900 BLOOD TYPING SEROLOGIC ABO: CPT

## 2018-09-24 PROCEDURE — 6370000000 HC RX 637 (ALT 250 FOR IP)

## 2018-09-24 PROCEDURE — 6370000000 HC RX 637 (ALT 250 FOR IP): Performed by: SURGERY

## 2018-09-24 PROCEDURE — 93306 TTE W/DOPPLER COMPLETE: CPT

## 2018-09-24 PROCEDURE — 80053 COMPREHEN METABOLIC PANEL: CPT

## 2018-09-24 PROCEDURE — 3600000012 HC SURGERY LEVEL 2 ADDTL 15MIN: Performed by: SURGERY

## 2018-09-24 PROCEDURE — P9016 RBC LEUKOCYTES REDUCED: HCPCS

## 2018-09-24 PROCEDURE — 6360000002 HC RX W HCPCS: Performed by: INTERNAL MEDICINE

## 2018-09-24 PROCEDURE — 76937 US GUIDE VASCULAR ACCESS: CPT

## 2018-09-24 PROCEDURE — 3600000002 HC SURGERY LEVEL 2 BASE: Performed by: SURGERY

## 2018-09-24 PROCEDURE — 85018 HEMOGLOBIN: CPT

## 2018-09-24 PROCEDURE — 0HRLXK3 REPLACEMENT OF LEFT LOWER LEG SKIN WITH NONAUTOLOGOUS TISSUE SUBSTITUTE, FULL THICKNESS, EXTERNAL APPROACH: ICD-10-PCS | Performed by: SURGERY

## 2018-09-24 PROCEDURE — C9113 INJ PANTOPRAZOLE SODIUM, VIA: HCPCS | Performed by: NURSE PRACTITIONER

## 2018-09-24 PROCEDURE — 2709999900 HC NON-CHARGEABLE SUPPLY: Performed by: SURGERY

## 2018-09-24 PROCEDURE — 85025 COMPLETE CBC W/AUTO DIFF WBC: CPT

## 2018-09-24 PROCEDURE — 36415 COLL VENOUS BLD VENIPUNCTURE: CPT

## 2018-09-24 PROCEDURE — 85014 HEMATOCRIT: CPT

## 2018-09-24 PROCEDURE — 2000000000 HC ICU R&B

## 2018-09-24 PROCEDURE — 6360000002 HC RX W HCPCS: Performed by: STUDENT IN AN ORGANIZED HEALTH CARE EDUCATION/TRAINING PROGRAM

## 2018-09-24 PROCEDURE — 3700000001 HC ADD 15 MINUTES (ANESTHESIA): Performed by: SURGERY

## 2018-09-24 PROCEDURE — 6360000002 HC RX W HCPCS: Performed by: HOSPITALIST

## 2018-09-24 PROCEDURE — 80202 ASSAY OF VANCOMYCIN: CPT

## 2018-09-24 PROCEDURE — 0JBN0ZZ EXCISION OF RIGHT LOWER LEG SUBCUTANEOUS TISSUE AND FASCIA, OPEN APPROACH: ICD-10-PCS | Performed by: SURGERY

## 2018-09-24 DEVICE — DRESSING WND 3 LAYR 10X15 CM MTRX CYTAL: Type: IMPLANTABLE DEVICE | Site: LEG | Status: FUNCTIONAL

## 2018-09-24 DEVICE — DRESSING WND 3 LAYR 7X10 CM MTRX CYTAL: Type: IMPLANTABLE DEVICE | Site: LEG | Status: FUNCTIONAL

## 2018-09-24 DEVICE — DRESSING WND MICRONIZED PARTIC 1000 MG MATRISTEM MICROMATRIX: Type: IMPLANTABLE DEVICE | Site: LEG | Status: FUNCTIONAL

## 2018-09-24 RX ORDER — FENTANYL CITRATE 50 UG/ML
25 INJECTION, SOLUTION INTRAMUSCULAR; INTRAVENOUS ONCE
Status: DISCONTINUED | OUTPATIENT
Start: 2018-09-24 | End: 2018-09-24 | Stop reason: HOSPADM

## 2018-09-24 RX ORDER — NEOSTIGMINE METHYLSULFATE 5 MG/5 ML
SYRINGE (ML) INTRAVENOUS PRN
Status: DISCONTINUED | OUTPATIENT
Start: 2018-09-24 | End: 2018-09-24 | Stop reason: SDUPTHER

## 2018-09-24 RX ORDER — SODIUM CHLORIDE 0.9 % (FLUSH) 0.9 %
10 SYRINGE (ML) INJECTION PRN
Status: DISCONTINUED | OUTPATIENT
Start: 2018-09-24 | End: 2018-09-24 | Stop reason: HOSPADM

## 2018-09-24 RX ORDER — FUROSEMIDE 10 MG/ML
40 INJECTION INTRAMUSCULAR; INTRAVENOUS ONCE
Status: COMPLETED | OUTPATIENT
Start: 2018-09-24 | End: 2018-09-24

## 2018-09-24 RX ORDER — PROPOFOL 10 MG/ML
INJECTION, EMULSION INTRAVENOUS PRN
Status: DISCONTINUED | OUTPATIENT
Start: 2018-09-24 | End: 2018-09-24 | Stop reason: SDUPTHER

## 2018-09-24 RX ORDER — SODIUM CHLORIDE 0.9 % (FLUSH) 0.9 %
10 SYRINGE (ML) INJECTION EVERY 12 HOURS SCHEDULED
Status: DISCONTINUED | OUTPATIENT
Start: 2018-09-24 | End: 2018-09-24 | Stop reason: HOSPADM

## 2018-09-24 RX ORDER — OXYCODONE HYDROCHLORIDE AND ACETAMINOPHEN 5; 325 MG/1; MG/1
2 TABLET ORAL ONCE
Status: COMPLETED | OUTPATIENT
Start: 2018-09-24 | End: 2018-09-24

## 2018-09-24 RX ORDER — MAGNESIUM HYDROXIDE 1200 MG/15ML
LIQUID ORAL CONTINUOUS PRN
Status: COMPLETED | OUTPATIENT
Start: 2018-09-24 | End: 2018-09-24

## 2018-09-24 RX ORDER — 0.9 % SODIUM CHLORIDE 0.9 %
250 INTRAVENOUS SOLUTION INTRAVENOUS ONCE
Status: COMPLETED | OUTPATIENT
Start: 2018-09-24 | End: 2018-09-24

## 2018-09-24 RX ORDER — ESMOLOL HYDROCHLORIDE 10 MG/ML
INJECTION INTRAVENOUS PRN
Status: DISCONTINUED | OUTPATIENT
Start: 2018-09-24 | End: 2018-09-24 | Stop reason: SDUPTHER

## 2018-09-24 RX ORDER — ULTRASOUND COUPLING MEDIUM
GEL (GRAM) TOPICAL PRN
Status: DISCONTINUED | OUTPATIENT
Start: 2018-09-24 | End: 2018-09-24 | Stop reason: HOSPADM

## 2018-09-24 RX ORDER — FENTANYL CITRATE 50 UG/ML
INJECTION, SOLUTION INTRAMUSCULAR; INTRAVENOUS PRN
Status: DISCONTINUED | OUTPATIENT
Start: 2018-09-24 | End: 2018-09-24 | Stop reason: SDUPTHER

## 2018-09-24 RX ORDER — GLYCOPYRROLATE 1 MG/5 ML
SYRINGE (ML) INTRAVENOUS PRN
Status: DISCONTINUED | OUTPATIENT
Start: 2018-09-24 | End: 2018-09-24 | Stop reason: SDUPTHER

## 2018-09-24 RX ORDER — NITROGLYCERIN 0.4 MG/1
TABLET SUBLINGUAL
Status: COMPLETED
Start: 2018-09-24 | End: 2018-09-24

## 2018-09-24 RX ORDER — ONDANSETRON 2 MG/ML
4 INJECTION INTRAMUSCULAR; INTRAVENOUS ONCE
Status: DISCONTINUED | OUTPATIENT
Start: 2018-09-24 | End: 2018-09-24 | Stop reason: HOSPADM

## 2018-09-24 RX ORDER — MIDAZOLAM HYDROCHLORIDE 1 MG/ML
2 INJECTION INTRAMUSCULAR; INTRAVENOUS
Status: DISCONTINUED | OUTPATIENT
Start: 2018-09-24 | End: 2018-09-24 | Stop reason: HOSPADM

## 2018-09-24 RX ORDER — ROCURONIUM BROMIDE 10 MG/ML
INJECTION, SOLUTION INTRAVENOUS PRN
Status: DISCONTINUED | OUTPATIENT
Start: 2018-09-24 | End: 2018-09-24 | Stop reason: SDUPTHER

## 2018-09-24 RX ORDER — ONDANSETRON 2 MG/ML
INJECTION INTRAMUSCULAR; INTRAVENOUS PRN
Status: DISCONTINUED | OUTPATIENT
Start: 2018-09-24 | End: 2018-09-24 | Stop reason: SDUPTHER

## 2018-09-24 RX ORDER — SODIUM CHLORIDE, SODIUM LACTATE, POTASSIUM CHLORIDE, CALCIUM CHLORIDE 600; 310; 30; 20 MG/100ML; MG/100ML; MG/100ML; MG/100ML
INJECTION, SOLUTION INTRAVENOUS CONTINUOUS PRN
Status: DISCONTINUED | OUTPATIENT
Start: 2018-09-24 | End: 2018-09-24 | Stop reason: SDUPTHER

## 2018-09-24 RX ORDER — NITROGLYCERIN 0.4 MG/1
0.4 TABLET SUBLINGUAL EVERY 5 MIN PRN
Status: DISCONTINUED | OUTPATIENT
Start: 2018-09-24 | End: 2018-10-05 | Stop reason: HOSPADM

## 2018-09-24 RX ORDER — VANCOMYCIN HYDROCHLORIDE 1 G/200ML
1000 INJECTION, SOLUTION INTRAVENOUS ONCE
Status: COMPLETED | OUTPATIENT
Start: 2018-09-24 | End: 2018-09-24

## 2018-09-24 RX ORDER — FENTANYL CITRATE 50 UG/ML
25 INJECTION, SOLUTION INTRAMUSCULAR; INTRAVENOUS EVERY 5 MIN PRN
Status: DISCONTINUED | OUTPATIENT
Start: 2018-09-24 | End: 2018-09-24 | Stop reason: HOSPADM

## 2018-09-24 RX ADMIN — FENTANYL CITRATE 25 MCG: 50 INJECTION, SOLUTION INTRAMUSCULAR; INTRAVENOUS at 16:27

## 2018-09-24 RX ADMIN — DARBEPOETIN ALFA 60 MCG: 60 INJECTION, SOLUTION INTRAVENOUS; SUBCUTANEOUS at 18:31

## 2018-09-24 RX ADMIN — Medication: at 02:32

## 2018-09-24 RX ADMIN — ESMOLOL HYDROCHLORIDE 30 MG: 10 INJECTION INTRAVENOUS at 14:53

## 2018-09-24 RX ADMIN — FENTANYL CITRATE 100 MCG: 50 INJECTION, SOLUTION INTRAMUSCULAR; INTRAVENOUS at 05:42

## 2018-09-24 RX ADMIN — PIPERACILLIN AND TAZOBACTAM 3.38 G: 3; .375 INJECTION, POWDER, LYOPHILIZED, FOR SOLUTION INTRAVENOUS; PARENTERAL at 04:42

## 2018-09-24 RX ADMIN — HYDROMORPHONE HYDROCHLORIDE 1 MG: 1 INJECTION, SOLUTION INTRAMUSCULAR; INTRAVENOUS; SUBCUTANEOUS at 19:30

## 2018-09-24 RX ADMIN — PROPOFOL 100 MG: 10 INJECTION, EMULSION INTRAVENOUS at 14:07

## 2018-09-24 RX ADMIN — ESMOLOL HYDROCHLORIDE 20 MG: 10 INJECTION INTRAVENOUS at 15:20

## 2018-09-24 RX ADMIN — PIPERACILLIN AND TAZOBACTAM 3.38 G: 3; .375 INJECTION, POWDER, LYOPHILIZED, FOR SOLUTION INTRAVENOUS; PARENTERAL at 17:46

## 2018-09-24 RX ADMIN — PROPOFOL 70 MG: 10 INJECTION, EMULSION INTRAVENOUS at 14:11

## 2018-09-24 RX ADMIN — ESMOLOL HYDROCHLORIDE 30 MG: 10 INJECTION INTRAVENOUS at 14:59

## 2018-09-24 RX ADMIN — OXYCODONE HYDROCHLORIDE AND ACETAMINOPHEN 2 TABLET: 5; 325 TABLET ORAL at 06:50

## 2018-09-24 RX ADMIN — NITROGLYCERIN 0.4 MG: 0.4 TABLET SUBLINGUAL at 10:13

## 2018-09-24 RX ADMIN — HYDROMORPHONE HYDROCHLORIDE 1 MG: 1 INJECTION, SOLUTION INTRAMUSCULAR; INTRAVENOUS; SUBCUTANEOUS at 16:58

## 2018-09-24 RX ADMIN — ESMOLOL HYDROCHLORIDE 20 MG: 10 INJECTION INTRAVENOUS at 14:12

## 2018-09-24 RX ADMIN — CALCIUM GLUCONATE 1 G: 98 INJECTION, SOLUTION INTRAVENOUS at 09:04

## 2018-09-24 RX ADMIN — HYDROMORPHONE HYDROCHLORIDE 1 MG: 1 INJECTION, SOLUTION INTRAMUSCULAR; INTRAVENOUS; SUBCUTANEOUS at 09:49

## 2018-09-24 RX ADMIN — FENTANYL CITRATE 50 MCG: 50 INJECTION INTRAMUSCULAR; INTRAVENOUS at 14:11

## 2018-09-24 RX ADMIN — SODIUM CHLORIDE, POTASSIUM CHLORIDE, SODIUM LACTATE AND CALCIUM CHLORIDE: 600; 310; 30; 20 INJECTION, SOLUTION INTRAVENOUS at 13:59

## 2018-09-24 RX ADMIN — VANCOMYCIN HYDROCHLORIDE 1000 MG: 1 INJECTION, SOLUTION INTRAVENOUS at 12:39

## 2018-09-24 RX ADMIN — IRON SUCROSE 200 MG: 20 INJECTION, SOLUTION INTRAVENOUS at 22:18

## 2018-09-24 RX ADMIN — FENTANYL CITRATE 50 MCG: 50 INJECTION INTRAMUSCULAR; INTRAVENOUS at 14:07

## 2018-09-24 RX ADMIN — Medication 2.5 MG: at 15:15

## 2018-09-24 RX ADMIN — SODIUM CHLORIDE 250 ML: 9 INJECTION, SOLUTION INTRAVENOUS at 10:49

## 2018-09-24 RX ADMIN — HYDROMORPHONE HYDROCHLORIDE 1 MG: 1 INJECTION, SOLUTION INTRAMUSCULAR; INTRAVENOUS; SUBCUTANEOUS at 07:45

## 2018-09-24 RX ADMIN — Medication: at 09:05

## 2018-09-24 RX ADMIN — FENTANYL CITRATE 25 MCG: 50 INJECTION INTRAMUSCULAR; INTRAVENOUS at 14:32

## 2018-09-24 RX ADMIN — FENTANYL CITRATE 100 MCG: 50 INJECTION, SOLUTION INTRAMUSCULAR; INTRAVENOUS at 06:50

## 2018-09-24 RX ADMIN — Medication 0.4 MG: at 15:15

## 2018-09-24 RX ADMIN — FENTANYL CITRATE 100 MCG: 50 INJECTION, SOLUTION INTRAMUSCULAR; INTRAVENOUS at 00:49

## 2018-09-24 RX ADMIN — FENTANYL CITRATE 25 MCG: 50 INJECTION, SOLUTION INTRAMUSCULAR; INTRAVENOUS at 16:05

## 2018-09-24 RX ADMIN — ROCURONIUM BROMIDE 20 MG: 10 INJECTION INTRAVENOUS at 14:07

## 2018-09-24 RX ADMIN — IRON SUCROSE 300 MG: 20 INJECTION, SOLUTION INTRAVENOUS at 12:39

## 2018-09-24 RX ADMIN — Medication 10 ML: at 07:59

## 2018-09-24 RX ADMIN — FENTANYL CITRATE 100 MCG: 50 INJECTION, SOLUTION INTRAMUSCULAR; INTRAVENOUS at 03:29

## 2018-09-24 RX ADMIN — ONDANSETRON 4 MG: 2 INJECTION INTRAMUSCULAR; INTRAVENOUS at 14:25

## 2018-09-24 RX ADMIN — FENTANYL CITRATE 100 MCG: 50 INJECTION, SOLUTION INTRAMUSCULAR; INTRAVENOUS at 04:42

## 2018-09-24 RX ADMIN — PANTOPRAZOLE SODIUM 40 MG: 40 INJECTION, POWDER, FOR SOLUTION INTRAVENOUS at 07:59

## 2018-09-24 RX ADMIN — METOPROLOL TARTRATE 25 MG: 25 TABLET ORAL at 22:17

## 2018-09-24 RX ADMIN — FENTANYL CITRATE 25 MCG: 50 INJECTION, SOLUTION INTRAMUSCULAR; INTRAVENOUS at 16:16

## 2018-09-24 RX ADMIN — Medication: at 08:00

## 2018-09-24 RX ADMIN — Medication: at 18:24

## 2018-09-24 RX ADMIN — FUROSEMIDE 40 MG: 10 INJECTION, SOLUTION INTRAMUSCULAR; INTRAVENOUS at 17:52

## 2018-09-24 ASSESSMENT — PAIN SCALES - GENERAL
PAINLEVEL_OUTOF10: 0
PAINLEVEL_OUTOF10: 5
PAINLEVEL_OUTOF10: 9
PAINLEVEL_OUTOF10: 7
PAINLEVEL_OUTOF10: 0
PAINLEVEL_OUTOF10: 10
PAINLEVEL_OUTOF10: 6
PAINLEVEL_OUTOF10: 10
PAINLEVEL_OUTOF10: 10
PAINLEVEL_OUTOF10: 6
PAINLEVEL_OUTOF10: 7
PAINLEVEL_OUTOF10: 10

## 2018-09-24 ASSESSMENT — PULMONARY FUNCTION TESTS
PIF_VALUE: 26
PIF_VALUE: 26
PIF_VALUE: 30
PIF_VALUE: 30
PIF_VALUE: 2
PIF_VALUE: 30
PIF_VALUE: 27
PIF_VALUE: 26
PIF_VALUE: 27
PIF_VALUE: 28
PIF_VALUE: 26
PIF_VALUE: 30
PIF_VALUE: 26
PIF_VALUE: 26
PIF_VALUE: 3
PIF_VALUE: 26
PIF_VALUE: 26
PIF_VALUE: 27
PIF_VALUE: 26
PIF_VALUE: 27
PIF_VALUE: 28
PIF_VALUE: 0
PIF_VALUE: 26
PIF_VALUE: 27
PIF_VALUE: 26
PIF_VALUE: 26
PIF_VALUE: 30
PIF_VALUE: 1
PIF_VALUE: 33
PIF_VALUE: 26
PIF_VALUE: 5
PIF_VALUE: 27
PIF_VALUE: 26
PIF_VALUE: 17
PIF_VALUE: 38
PIF_VALUE: 26
PIF_VALUE: 1
PIF_VALUE: 6
PIF_VALUE: 28
PIF_VALUE: 26
PIF_VALUE: 27
PIF_VALUE: 27
PIF_VALUE: 26
PIF_VALUE: 1
PIF_VALUE: 26
PIF_VALUE: 0
PIF_VALUE: 1
PIF_VALUE: 26
PIF_VALUE: 26
PIF_VALUE: 3
PIF_VALUE: 30
PIF_VALUE: 26
PIF_VALUE: 27
PIF_VALUE: 27
PIF_VALUE: 26
PIF_VALUE: 26
PIF_VALUE: 30
PIF_VALUE: 11
PIF_VALUE: 27
PIF_VALUE: 16
PIF_VALUE: 26
PIF_VALUE: 26
PIF_VALUE: 27
PIF_VALUE: 26
PIF_VALUE: 27
PIF_VALUE: 26
PIF_VALUE: 18
PIF_VALUE: 26
PIF_VALUE: 3
PIF_VALUE: 1
PIF_VALUE: 26
PIF_VALUE: 27
PIF_VALUE: 26
PIF_VALUE: 19

## 2018-09-24 ASSESSMENT — PAIN DESCRIPTION - FREQUENCY: FREQUENCY: CONTINUOUS

## 2018-09-24 ASSESSMENT — PAIN DESCRIPTION - ONSET: ONSET: ON-GOING

## 2018-09-24 ASSESSMENT — PAIN DESCRIPTION - PROGRESSION: CLINICAL_PROGRESSION: NOT CHANGED

## 2018-09-24 ASSESSMENT — ENCOUNTER SYMPTOMS
SPUTUM PRODUCTION: 0
SHORTNESS OF BREATH: 1
GASTROINTESTINAL NEGATIVE: 1
RESPIRATORY NEGATIVE: 1
COUGH: 0
NAUSEA: 0
VOMITING: 0
ABDOMINAL PAIN: 0

## 2018-09-24 ASSESSMENT — PAIN - FUNCTIONAL ASSESSMENT: PAIN_FUNCTIONAL_ASSESSMENT: 0-10

## 2018-09-24 ASSESSMENT — PAIN DESCRIPTION - LOCATION
LOCATION: LEG
LOCATION: LEG

## 2018-09-24 ASSESSMENT — PAIN DESCRIPTION - DESCRIPTORS: DESCRIPTORS: BURNING

## 2018-09-24 ASSESSMENT — PAIN DESCRIPTION - PAIN TYPE
TYPE: ACUTE PAIN
TYPE: ACUTE PAIN

## 2018-09-24 ASSESSMENT — PAIN DESCRIPTION - ORIENTATION
ORIENTATION: RIGHT;LEFT;LOWER
ORIENTATION: RIGHT;LEFT

## 2018-09-24 NOTE — CONSULTS
Division of Vascular Surgery          Vascular Consult      Name: Kalie Blum  MRN: [id number]       Physician Requesting Consult:  Dr. Eri Russell    Reason for Consult:   B/l lower extremity wounds    Chief Complaint:      lower extremity wounds    History of Present Illness:      Kalie Blum is a 77 y.o.  female who presents with bilateral lower extremity chronic wounds for the past 6 months. Patient states she was working outside in the yard and believes she may have been bitten by a couple of spiders. She has not seen anyone for these wounds but states she has been managing them with hydrogen peroxide without any relief. Denies ever having these kinds of wounds before, denies hx of PAD, DM. Denies fevers, chills, SOB, chest pain, dysuria, diarrhea. Past Medical History:     Past Medical History:   Diagnosis Date    SAM (acute kidney injury) ([facility] [address] 9/23/2018    Heart attack ([facility] [address] 1982    Skin lesions     bilateral legs        Past Surgical History:     Past Surgical History:   Procedure Laterality Date    TUBAL LIGATION  1974        Medications Prior to Admission:       Prior to Admission medications    Medication Sig Start Date End Date Taking?  Authorizing Provider   Ascorbic Acid (VITAMIN C) 500 MG tablet Take 1 tablet by mouth daily 5/11/15   Kole Hall MD   gabapentin (NEURONTIN) 300 MG capsule Take 1 capsule by mouth 3 times daily Take 300mg po nightly for 3 days, then 300mg po bid for 3 days, then 300mg po tid. 5/11/15   Kole Hall MD   carvedilol (COREG) 12.5 MG tablet Take 1 tablet by mouth 2 times daily (with meals) 5/11/15   Kole Hall MD   NIFEdipine (NIFEDICAL XL) 60 MG CR tablet Take 1 tablet by mouth daily 5/11/15   Kole Hall MD   spironolactone (ALDACTONE) 25 MG tablet Take 1 tablet by mouth daily 5/11/15   Kole Hall MD   ranitidine (ZANTAC) 150 MG tablet Take 1 tablet by mouth 2 times daily 5/11/15   Kole Hall MD        Allergies:       Fruit & findings or movement disorder noted, cranial nerves II through XII grossly intact  Extremities - + doppler signals DP/PT bilaterally  Skin - R anterior leg with large wound with yellow exudate, punctate areas of pus with large area of erythema; R posterior large wound with yellow exudate; L very large anterolateral wound with purulent drainage and bleeding; L posterior wound - pictures below                          Imaging:     Xray R and L tib-fib: no karuna appreciated      Assessment:     Amada Baeza is a 77 y.o.  female with chronic infected b/l lower extremity wounds      Plan:     1. No vascular surgery intervention at this time. 2. Recommend b/l LE RAFAEL and PVR for baseline  3. Continue broad-spectrum abx, DVT ppx  4. Continue aggressive fluid hydration  5. Recommend general surgery consult for wound debridement  6. Patient can follow up as an outpatient for permanent hemodialysis access. ----------------------------------------    Alice Coker D.O. General Surgery Resident PGY-1  Pager # [phone]  9/24/2018, 5:37 AM      [address] [place] [address] Dr: [phone]  C: [phone]  Email: [email or web address]

## 2018-09-24 NOTE — BRIEF OP NOTE
Assessment No urethral drainage 9/24/2018 12:00 PM   Urine Color Yellow 9/24/2018 12:00 PM   Urine Appearance Clear 9/24/2018 12:00 PM   Output (mL) 25 mL 9/24/2018  1:00 PM       Findings: Wound class 3    Stacy Cole DO  Date: 9/24/2018  Time: 3:28 PM

## 2018-09-24 NOTE — PROGRESS NOTES
Urine Creatinine:     Lab Results   Component Value Date    LABCREA 213.9 09/23/2018     Urine Eosinophils:  No components found for: UEOS    Radiology:     CXR:     Assessment:     1. Acute Kidney Injury: ssecondary to ischemic ATN from systemic inflammatory response ssyndrome, hypotension low-flow, Aggravated by NSAID use. Baseline creatinine not known creat on presentation was 3, appears to be resolving, nonoliguric  2. Chronic kidney disease suspected to  To be stage III, ultrasound shows, asymmetrical kidneys with the right 11.5, left 9.2 cm. No baseline creatinine available  3.mild volume overload  4. Atypical chest pain  5. Chronic lower extremity ulcers with necrotic changes for debridement today  6. Hyperkalemia resolved  7. Anemia of chronic disease with low iron saturation    Plan:   1. Degrees bicarb infusion to 100 mL an hour  2. Lasix 40 minute on IV, 1 dose  3. Intravenous iron  4. Aranesp  5. Follow renal function    Nutrition   Please ensure that patient is on a renal diet/TF. Avoid nephrotoxic drugs/contrast exposure. We will continue to follow along with you.

## 2018-09-24 NOTE — PROGRESS NOTES
builder 150 mL/hr at 09/24/18 0905    dextrose      dextrose       PRN Meds:     HYDROmorphone 0.5 mg Q2H PRN   Or     HYDROmorphone 1 mg Q2H PRN   ondansetron 4 mg Q6H PRN   nicotine 1 patch Daily PRN   acetaminophen 650 mg Q4H PRN   dextrose 25 g PRN   dextrose 12.5 g PRN   dextrose 100 mL/hr PRN   glucose 15 g PRN   dextrose 12.5 g PRN   glucagon (rDNA) 1 mg PRN   dextrose 100 mL/hr PRN         VENT SETTINGS (Comprehensive) (if applicable): Additional Respiratory  Assessments  Pulse: 85  Resp: 19  SpO2: 99 %      Laboratory findings:    Complete Blood Count: Recent Labs      09/23/18   0351   09/23/18   1047  09/23/18 2011 09/24/18   0515   WBC  25.1*   --   26.7*   --   20.7*   HGB  5.7*   < >  6.5*  6.6*  7.7*   HCT  21.1*   < >  23.1*  23.1*  25.5*   PLT  See Reflexed IPF Result   --   444   --   355    < > = values in this interval not displayed.         Last 3 Blood Glucose:   Recent Labs      09/23/18   1047  09/23/18   1732 09/24/18   0515   GLUCOSE  113*  81  94        PT/INR:    Lab Results   Component Value Date    PROTIME 10.9 09/24/2018    INR 1.0 09/24/2018     PTT:  No results found for: APTT, PTT    Comprehensive Metabolic Profile:   Recent Labs      09/23/18   1047  09/23/18   1732 09/24/18   0006  09/24/18   0515   NA  130*  130*  134*  135  135   K  6.4*  5.5*   --   5.3   CL  101  98   --   104   CO2  14*  18*   --   21   BUN  58*  54*   --   45*   CREATININE  3.03*  2.51*   --   2.30*   GLUCOSE  113*  81   --   94   CALCIUM  8.1*  7.8*   --   8.0*   PROT   --    --    --   6.6   LABALBU   --    --    --   2.3*   BILITOT   --    --    --   0.50   ALKPHOS   --    --    --   80   AST   --    --    --   85*   ALT   --    --    --   18      Magnesium: No results found for: MG  Phosphorus:   Lab Results   Component Value Date    PHOS 3.8 09/24/2018     Ionized Calcium:   Lab Results   Component Value Date    CAION 1.02 09/24/2018              ASSESSMENT:     Principal Problem:    Blood

## 2018-09-24 NOTE — PROGRESS NOTES
iron sucrose  300 mg Intravenous Daily    darbepoetin reva-polysorbate  60 mcg Subcutaneous Weekly    sodium chloride  250 mL Intravenous Once    metoprolol tartrate  25 mg Oral BID    vitamin C  500 mg Oral Daily    vancomycin (VANCOCIN) intermittent dosing (placeholder)   Other RX Placeholder    pantoprazole (PROTONIX) bolus  80 mg Intravenous Once    pantoprazole  40 mg Intravenous BID    And    sodium chloride (PF)  10 mL Intravenous Daily    sodium hypochlorite   Irrigation Daily    calcium gluconate  1 g Intravenous Once    iron sucrose  200 mg Intravenous Q24H     Thank you for allowing us to participate in the care of this patient. Please call with questions. [facility]  Efra Calhoun MD  [employer] messaging  OFFICE: [phone]  CELL:     [phone]    Electronically signed by Efra Calhoun MD on 9/24/2018 at 5:10 PM    This note is created with the assistance of a speech recognition program.  While intending to generate a document that actually reflects the content of the visit, the document can still have some errors including those of syntax and sound a like substitutions which may escape proof reading. It such instances, actual meaning can be extrapolated by contextual diversion.

## 2018-09-24 NOTE — PROGRESS NOTES
Smoking Cessation - topics covered   []  Health Risks  []  Benefits of Quitting   []  Smoking Cessation  []  Patient has no history of tobacco use  [x]  Patient is former smoker. [x]  No need for tobacco cessation education. []  Booklet given  []  Patient verbalizes understanding. []  Patient denies need for tobacco cessation education.   Earlene Casanova  7:39 AM

## 2018-09-25 PROBLEM — E87.1 HYPONATREMIA: Status: ACTIVE | Noted: 2018-09-25

## 2018-09-25 LAB
ABSOLUTE EOS #: 0.36 K/UL (ref 0–0.44)
ABSOLUTE IMMATURE GRANULOCYTE: 0.18 K/UL (ref 0–0.3)
ABSOLUTE LYMPH #: 1.6 K/UL (ref 1.1–3.7)
ABSOLUTE MONO #: 1.25 K/UL (ref 0.1–1.2)
ANCA MYELOPEROXIDASE: 7 AU/ML
ANCA PROTEINASE 3: 8 AU/ML
BASOPHILS # BLD: 0 % (ref 0–2)
BASOPHILS ABSOLUTE: 0 K/UL (ref 0–0.2)
BUN BLDV-MCNC: 37 MG/DL (ref 8–23)
CALCIUM SERPL-MCNC: 7.7 MG/DL (ref 8.6–10.4)
CHLORIDE BLD-SCNC: 99 MMOL/L (ref 98–107)
CO2: 25 MMOL/L (ref 20–31)
CREAT SERPL-MCNC: 2.28 MG/DL (ref 0.5–0.9)
DIFFERENTIAL TYPE: ABNORMAL
EOSINOPHILS RELATIVE PERCENT: 2 % (ref 1–4)
GFR AFRICAN AMERICAN: 26 ML/MIN
GFR NON-AFRICAN AMERICAN: 21 ML/MIN
GFR SERPL CREATININE-BSD FRML MDRD: ABNORMAL ML/MIN/{1.73_M2}
GFR SERPL CREATININE-BSD FRML MDRD: ABNORMAL ML/MIN/{1.73_M2}
GLUCOSE BLD-MCNC: 90 MG/DL (ref 70–99)
HCT VFR BLD CALC: 23.8 % (ref 36.3–47.1)
HCT VFR BLD CALC: 25.2 % (ref 36.3–47.1)
HCT VFR BLD CALC: 25.3 % (ref 36.3–47.1)
HCT VFR BLD CALC: 25.4 % (ref 36.3–47.1)
HCT VFR BLD CALC: 26.2 % (ref 36.3–47.1)
HEMOGLOBIN: 6.9 G/DL (ref 11.9–15.1)
HEMOGLOBIN: 7.5 G/DL (ref 11.9–15.1)
HEMOGLOBIN: 7.6 G/DL (ref 11.9–15.1)
HEMOGLOBIN: 7.6 G/DL (ref 11.9–15.1)
HEMOGLOBIN: 7.8 G/DL (ref 11.9–15.1)
IMMATURE GRANULOCYTES: 1 %
LACTIC ACID, WHOLE BLOOD: 0.8 MMOL/L (ref 0.7–2.1)
LACTIC ACID: NORMAL MMOL/L
LYMPHOCYTES # BLD: 9 % (ref 24–43)
MCH RBC QN AUTO: 25 PG (ref 25.2–33.5)
MCHC RBC AUTO-ENTMCNC: 30.8 G/DL (ref 28.4–34.8)
MCV RBC AUTO: 81.1 FL (ref 82.6–102.9)
MONOCYTES # BLD: 7 % (ref 3–12)
MORPHOLOGY: ABNORMAL
NRBC AUTOMATED: 0.3 PER 100 WBC
PDW BLD-RTO: 21.5 % (ref 11.8–14.4)
PLATELET # BLD: 292 K/UL (ref 138–453)
PLATELET ESTIMATE: ABNORMAL
PMV BLD AUTO: 8.9 FL (ref 8.1–13.5)
POTASSIUM SERPL-SCNC: 4.9 MMOL/L (ref 3.7–5.3)
PREALBUMIN: 6.6 MG/DL (ref 20–40)
RBC # BLD: 3.12 M/UL (ref 3.95–5.11)
RBC # BLD: ABNORMAL 10*6/UL
SEG NEUTROPHILS: 81 % (ref 36–65)
SEGMENTED NEUTROPHILS ABSOLUTE COUNT: 14.41 K/UL (ref 1.5–8.1)
SODIUM BLD-SCNC: 132 MMOL/L (ref 135–144)
SODIUM BLD-SCNC: 133 MMOL/L (ref 135–144)
SODIUM BLD-SCNC: 134 MMOL/L (ref 135–144)
SODIUM BLD-SCNC: 137 MMOL/L (ref 135–144)
TROPONIN INTERP: ABNORMAL
TROPONIN T: 0.05 NG/ML
TROPONIN T: 0.07 NG/ML
TROPONIN T: 0.08 NG/ML
TROPONIN T: 0.1 NG/ML
WBC # BLD: 17.8 K/UL (ref 3.5–11.3)
WBC # BLD: ABNORMAL 10*3/UL

## 2018-09-25 PROCEDURE — 82565 ASSAY OF CREATININE: CPT

## 2018-09-25 PROCEDURE — 2580000003 HC RX 258: Performed by: NURSE PRACTITIONER

## 2018-09-25 PROCEDURE — 84134 ASSAY OF PREALBUMIN: CPT

## 2018-09-25 PROCEDURE — 6370000000 HC RX 637 (ALT 250 FOR IP): Performed by: HOSPITALIST

## 2018-09-25 PROCEDURE — 6360000002 HC RX W HCPCS: Performed by: INTERNAL MEDICINE

## 2018-09-25 PROCEDURE — 6370000000 HC RX 637 (ALT 250 FOR IP): Performed by: INTERNAL MEDICINE

## 2018-09-25 PROCEDURE — 99232 SBSQ HOSP IP/OBS MODERATE 35: CPT | Performed by: INTERNAL MEDICINE

## 2018-09-25 PROCEDURE — 36415 COLL VENOUS BLD VENIPUNCTURE: CPT

## 2018-09-25 PROCEDURE — 82435 ASSAY OF BLOOD CHLORIDE: CPT

## 2018-09-25 PROCEDURE — 2500000003 HC RX 250 WO HCPCS: Performed by: INTERNAL MEDICINE

## 2018-09-25 PROCEDURE — 84484 ASSAY OF TROPONIN QUANT: CPT

## 2018-09-25 PROCEDURE — 83605 ASSAY OF LACTIC ACID: CPT

## 2018-09-25 PROCEDURE — 84295 ASSAY OF SERUM SODIUM: CPT

## 2018-09-25 PROCEDURE — 6370000000 HC RX 637 (ALT 250 FOR IP): Performed by: NURSE PRACTITIONER

## 2018-09-25 PROCEDURE — 84132 ASSAY OF SERUM POTASSIUM: CPT

## 2018-09-25 PROCEDURE — 99233 SBSQ HOSP IP/OBS HIGH 50: CPT | Performed by: INTERNAL MEDICINE

## 2018-09-25 PROCEDURE — 86900 BLOOD TYPING SEROLOGIC ABO: CPT

## 2018-09-25 PROCEDURE — 85014 HEMATOCRIT: CPT

## 2018-09-25 PROCEDURE — 85025 COMPLETE CBC W/AUTO DIFF WBC: CPT

## 2018-09-25 PROCEDURE — C9113 INJ PANTOPRAZOLE SODIUM, VIA: HCPCS | Performed by: NURSE PRACTITIONER

## 2018-09-25 PROCEDURE — 85018 HEMOGLOBIN: CPT

## 2018-09-25 PROCEDURE — 99291 CRITICAL CARE FIRST HOUR: CPT | Performed by: INTERNAL MEDICINE

## 2018-09-25 PROCEDURE — 2580000003 HC RX 258: Performed by: STUDENT IN AN ORGANIZED HEALTH CARE EDUCATION/TRAINING PROGRAM

## 2018-09-25 PROCEDURE — 6360000002 HC RX W HCPCS: Performed by: NURSE PRACTITIONER

## 2018-09-25 PROCEDURE — 6370000000 HC RX 637 (ALT 250 FOR IP): Performed by: EMERGENCY MEDICINE

## 2018-09-25 PROCEDURE — 2060000000 HC ICU INTERMEDIATE R&B

## 2018-09-25 PROCEDURE — 2700000000 HC OXYGEN THERAPY PER DAY

## 2018-09-25 PROCEDURE — 6360000002 HC RX W HCPCS: Performed by: STUDENT IN AN ORGANIZED HEALTH CARE EDUCATION/TRAINING PROGRAM

## 2018-09-25 PROCEDURE — 84520 ASSAY OF UREA NITROGEN: CPT

## 2018-09-25 PROCEDURE — 2580000003 HC RX 258: Performed by: EMERGENCY MEDICINE

## 2018-09-25 PROCEDURE — 82310 ASSAY OF CALCIUM: CPT

## 2018-09-25 PROCEDURE — P9016 RBC LEUKOCYTES REDUCED: HCPCS

## 2018-09-25 PROCEDURE — 6370000000 HC RX 637 (ALT 250 FOR IP): Performed by: STUDENT IN AN ORGANIZED HEALTH CARE EDUCATION/TRAINING PROGRAM

## 2018-09-25 PROCEDURE — 93005 ELECTROCARDIOGRAM TRACING: CPT

## 2018-09-25 PROCEDURE — 99211 OFF/OP EST MAY X REQ PHY/QHP: CPT

## 2018-09-25 PROCEDURE — 94762 N-INVAS EAR/PLS OXIMTRY CONT: CPT

## 2018-09-25 PROCEDURE — 2580000003 HC RX 258: Performed by: INTERNAL MEDICINE

## 2018-09-25 PROCEDURE — 82374 ASSAY BLOOD CARBON DIOXIDE: CPT

## 2018-09-25 PROCEDURE — 82947 ASSAY GLUCOSE BLOOD QUANT: CPT

## 2018-09-25 RX ORDER — 0.9 % SODIUM CHLORIDE 0.9 %
250 INTRAVENOUS SOLUTION INTRAVENOUS ONCE
Status: COMPLETED | OUTPATIENT
Start: 2018-09-25 | End: 2018-09-25

## 2018-09-25 RX ORDER — OXYCODONE HYDROCHLORIDE 5 MG/1
10 TABLET ORAL EVERY 4 HOURS PRN
Status: DISCONTINUED | OUTPATIENT
Start: 2018-09-25 | End: 2018-10-05 | Stop reason: HOSPADM

## 2018-09-25 RX ORDER — LINEZOLID 600 MG/1
600 TABLET, FILM COATED ORAL EVERY 12 HOURS SCHEDULED
Status: DISCONTINUED | OUTPATIENT
Start: 2018-09-25 | End: 2018-10-02

## 2018-09-25 RX ORDER — FUROSEMIDE 10 MG/ML
80 INJECTION INTRAMUSCULAR; INTRAVENOUS 2 TIMES DAILY
Status: DISCONTINUED | OUTPATIENT
Start: 2018-09-25 | End: 2018-09-26

## 2018-09-25 RX ORDER — BISACODYL 10 MG
10 SUPPOSITORY, RECTAL RECTAL DAILY PRN
Status: DISCONTINUED | OUTPATIENT
Start: 2018-09-25 | End: 2018-10-05 | Stop reason: HOSPADM

## 2018-09-25 RX ORDER — POLYETHYLENE GLYCOL 3350 17 G/17G
17 POWDER, FOR SOLUTION ORAL DAILY
Status: DISCONTINUED | OUTPATIENT
Start: 2018-09-25 | End: 2018-09-27

## 2018-09-25 RX ORDER — OXYCODONE HYDROCHLORIDE 5 MG/1
15 TABLET ORAL EVERY 4 HOURS PRN
Status: DISCONTINUED | OUTPATIENT
Start: 2018-09-25 | End: 2018-10-05 | Stop reason: HOSPADM

## 2018-09-25 RX ORDER — POLYETHYLENE GLYCOL 3350 17 G/17G
17 POWDER, FOR SOLUTION ORAL DAILY PRN
Status: DISCONTINUED | OUTPATIENT
Start: 2018-09-25 | End: 2018-10-05 | Stop reason: HOSPADM

## 2018-09-25 RX ADMIN — METOPROLOL TARTRATE 25 MG: 25 TABLET ORAL at 08:29

## 2018-09-25 RX ADMIN — Medication: at 06:10

## 2018-09-25 RX ADMIN — HYDROMORPHONE HYDROCHLORIDE 1 MG: 1 INJECTION, SOLUTION INTRAMUSCULAR; INTRAVENOUS; SUBCUTANEOUS at 02:12

## 2018-09-25 RX ADMIN — HYDROMORPHONE HYDROCHLORIDE 1 MG: 1 INJECTION, SOLUTION INTRAMUSCULAR; INTRAVENOUS; SUBCUTANEOUS at 05:41

## 2018-09-25 RX ADMIN — PANTOPRAZOLE SODIUM 40 MG: 40 INJECTION, POWDER, FOR SOLUTION INTRAVENOUS at 00:51

## 2018-09-25 RX ADMIN — PIPERACILLIN AND TAZOBACTAM 3.38 G: 3; .375 INJECTION, POWDER, LYOPHILIZED, FOR SOLUTION INTRAVENOUS; PARENTERAL at 17:39

## 2018-09-25 RX ADMIN — Medication 500 MG: at 08:28

## 2018-09-25 RX ADMIN — IRON SUCROSE 300 MG: 20 INJECTION, SOLUTION INTRAVENOUS at 12:30

## 2018-09-25 RX ADMIN — HYDROMORPHONE HYDROCHLORIDE 0.5 MG: 1 INJECTION, SOLUTION INTRAMUSCULAR; INTRAVENOUS; SUBCUTANEOUS at 10:42

## 2018-09-25 RX ADMIN — PIPERACILLIN AND TAZOBACTAM 3.38 G: 3; .375 INJECTION, POWDER, LYOPHILIZED, FOR SOLUTION INTRAVENOUS; PARENTERAL at 05:37

## 2018-09-25 RX ADMIN — PANTOPRAZOLE SODIUM 40 MG: 40 INJECTION, POWDER, FOR SOLUTION INTRAVENOUS at 19:37

## 2018-09-25 RX ADMIN — OXYCODONE HYDROCHLORIDE 10 MG: 5 TABLET ORAL at 17:45

## 2018-09-25 RX ADMIN — PANTOPRAZOLE SODIUM 40 MG: 40 INJECTION, POWDER, FOR SOLUTION INTRAVENOUS at 08:29

## 2018-09-25 RX ADMIN — Medication 10 ML: at 08:29

## 2018-09-25 RX ADMIN — FUROSEMIDE 80 MG: 10 INJECTION, SOLUTION INTRAMUSCULAR; INTRAVENOUS at 17:39

## 2018-09-25 RX ADMIN — SODIUM CHLORIDE 250 ML: 9 INJECTION, SOLUTION INTRAVENOUS at 01:15

## 2018-09-25 RX ADMIN — FUROSEMIDE 80 MG: 10 INJECTION, SOLUTION INTRAMUSCULAR; INTRAVENOUS at 09:40

## 2018-09-25 RX ADMIN — POLYETHYLENE GLYCOL 3350 17 G: 17 POWDER, FOR SOLUTION ORAL at 19:35

## 2018-09-25 RX ADMIN — LINEZOLID 600 MG: 600 TABLET, FILM COATED ORAL at 19:36

## 2018-09-25 RX ADMIN — METOPROLOL TARTRATE 25 MG: 25 TABLET ORAL at 19:36

## 2018-09-25 RX ADMIN — IRON SUCROSE 200 MG: 20 INJECTION, SOLUTION INTRAVENOUS at 21:54

## 2018-09-25 RX ADMIN — OXYCODONE HYDROCHLORIDE 10 MG: 5 TABLET ORAL at 12:30

## 2018-09-25 ASSESSMENT — ENCOUNTER SYMPTOMS
COUGH: 0
SHORTNESS OF BREATH: 1
RESPIRATORY NEGATIVE: 1
NAUSEA: 0
VOMITING: 0
GASTROINTESTINAL NEGATIVE: 1
ABDOMINAL PAIN: 0
SPUTUM PRODUCTION: 0

## 2018-09-25 ASSESSMENT — PAIN DESCRIPTION - DESCRIPTORS: DESCRIPTORS: BURNING

## 2018-09-25 ASSESSMENT — PAIN SCALES - GENERAL
PAINLEVEL_OUTOF10: 10
PAINLEVEL_OUTOF10: 4
PAINLEVEL_OUTOF10: 4
PAINLEVEL_OUTOF10: 10
PAINLEVEL_OUTOF10: 5
PAINLEVEL_OUTOF10: 10
PAINLEVEL_OUTOF10: 4
PAINLEVEL_OUTOF10: 4
PAINLEVEL_OUTOF10: 10

## 2018-09-25 ASSESSMENT — PAIN DESCRIPTION - LOCATION: LOCATION: LEG

## 2018-09-25 ASSESSMENT — PAIN DESCRIPTION - ONSET: ONSET: ON-GOING

## 2018-09-25 ASSESSMENT — PAIN DESCRIPTION - FREQUENCY: FREQUENCY: CONTINUOUS

## 2018-09-25 ASSESSMENT — PAIN DESCRIPTION - PAIN TYPE: TYPE: ACUTE PAIN

## 2018-09-25 ASSESSMENT — PAIN DESCRIPTION - ORIENTATION: ORIENTATION: RIGHT;LEFT

## 2018-09-25 NOTE — PROGRESS NOTES
color, texture, turgor normal. No rashes or lesions. Data:  CBC with Differential:    Lab Results   Component Value Date    WBC 17.8 09/25/2018    RBC 3.12 09/25/2018    HGB 7.8 09/25/2018    HCT 25.3 09/25/2018     09/25/2018    MCV 81.1 09/25/2018    MCH 25.0 09/25/2018    MCHC 30.8 09/25/2018    RDW 21.5 09/25/2018    LYMPHOPCT Pending 09/25/2018    MONOPCT Pending 09/25/2018    BASOPCT Pending 09/25/2018    MONOSABS Pending 09/25/2018    LYMPHSABS Pending 09/25/2018    EOSABS Pending 09/25/2018    BASOSABS Pending 09/25/2018    DIFFTYPE NOT REPORTED 09/25/2018     CMP:    Lab Results   Component Value Date     09/25/2018    K 5.3 09/24/2018     09/24/2018    CO2 21 09/24/2018    BUN 45 09/24/2018    CREATININE 2.30 09/24/2018    GFRAA 25 09/24/2018    LABGLOM 21 09/24/2018    GLUCOSE 94 09/24/2018    PROT 6.6 09/24/2018    LABALBU 2.3 09/24/2018    CALCIUM 8.0 09/24/2018    BILITOT 0.50 09/24/2018    ALKPHOS 80 09/24/2018    AST 85 09/24/2018    ALT 18 09/24/2018     Radiology Review:    Xr Tibia Fibula Left (2 Views)     Result Date: 9/23/2018  EXAMINATION: TWO XRAY VIEWS OF THE LEFT TIBIA AND FIBULA 9/23/2018 3:27 am COMPARISON: None. HISTORY: ORDERING SYSTEM PROVIDED HISTORY: Large areas of ulceration and draining wounds TECHNOLOGIST PROVIDED HISTORY: Large areas of ulceration and draining wounds FINDINGS: No radiographic evidence of fracture or dislocation identified. Moderate to severe joint space loss at the knee. No focal osseous erosion. Plantar spur.      1. No fracture or dislocation identified. 2. No radiographic evidence of osteomyelitis.      Xr Tibia Fibula Right (2 Views)     Result Date: 9/23/2018  EXAMINATION: TWO XRAY VIEWS OF THE RIGHT TIBIA AND FIBULA 9/23/2018 3:27 am COMPARISON: None.  HISTORY: ORDERING SYSTEM PROVIDED HISTORY: Large areas of ulceration draining wounds TECHNOLOGIST PROVIDED HISTORY: Large areas of ulceration draining wounds FINDINGS: No radiographic Hyperkalemia [E87.5] 09/23/2018    SAM (acute kidney injury) ([facility] [address] [N17.9] 09/23/2018    Rectal bleeding [K62.5]     Bilateral leg pain [M79.604, M79.605]     Acute renal failure ([facility] [address] [A66.9]     Metabolic acidosis [E49.3]     Heart attack [facility]) [I21.9] 01/01/1982       77 y.o. female with chronic lower extremity wounds  - POD# 1 s/p POD # 1 s/p Excisional Debridement bilateral lower extremities w/ Acell Placement    Plan:  1. Elevate b/l LEs. Leave adaptic dressing until 9/27. Dressing change per surgery, ok to reinforce ACEs and outer Kerlix rolls as needed   2. Analgesia:  Pain control per primary  3. Continue medical management and supportive care per primary. Continue Vanco and Zosyn. 4. Continue to encourage incentive spirometry  5. GI Prophylaxis: Protonix    Electronically signed by Taylor Call  on 9/25/2018 at 5:50 AM     Patient seen and examined at bedside. Changes made to above note as needed including assessment and plan.     Electronically signed by Michael Sorto DO on 9/25/2018 at 6:48 AM

## 2018-09-25 NOTE — PROGRESS NOTES
[id number] [facility]    Progress Note    9/25/2018    1:15 PM    Name:   Mayra Schmitz  MRN:     [id number]     Acct:      [de-identified]   Room:   [address] [place] [address]  IP Day:  2  Admit Date:  9/23/2018  1:29 AM    PCP:   No primary care provider on file. Code Status:  Full Code    Subjective:     C/C:   Chief Complaint   Patient presents with    Wound Check     legs bilat    Leg Pain     bilat     Interval History Status:      Postop day 1 wound debridement  Pain controlled  I and O +2.8 L  Wound care continues    Database updates:  Sodium 134 (L)  Calcium 7.7 (L)  Sulfa  BUN 37 (H) mg/dL CREATININE 2.28 (H)    Troponin T 0.10 (HH)  Discussed with Dr. Davi Abrams - felt to be type II phenomenon    WBC 17.8 (H) k/uL RBC 3.12 (L) m/uL   Hemoglobin 7.8 (L) g/dL Hematocrit 25.3 (L) % MCV 81.1 (L) fL MCH 25.0 (L)    Specimen Source: Blood Specimen Description . BLOOD Special Requests RT UPPER ARM 4 ML Culture POSITIVE BLOOD CULTURE, RN NOTIFIED: Taiwo KUMAR AT 0194 09/24/2018 (A) Culture DIRECT GRAM STAIN FROM BOTTLE: GRAM POSITIVE RODS Culture DIPHTHEROIDS A single positive blood culture of coagulase negative staphylococci, (A) Culture  micrococci, diphtheroids or Bacillus species should be interpreted with caution (A) Culture  and viewed as likely a skin contaminant. (A) Status Pending      Brief History:     As documented in the medical record: \"The patient is a 77 y.o. Non-/non  female who came into the emergency department because of bilateral lower extremity wounds. The patient says that she has been having these lower extremity wound for the last 6 months. She says she does not see any physician. An only medication that she takes at home is Aleve. She says for the last month or 2 she started having bleeding from the lower extremity wounds. She has been trying to be due home remedies and had been applying peroxide on them.   Otherwise she did not see any --        Lab Results   Component Value Date/Time    SPECIAL LEFT ARM 3 ML 09/23/2018 08:40 AM     Lab Results   Component Value Date/Time    CULTURE NO GROWTH 2 DAYS 09/23/2018 08:40 AM       No results found for: POCPH, PHART, PH, POCPCO2, VFN0LUL, PCO2, POCPO2, PO2ART, PO2, POCHCO3, IHY3EYQ, HCO3, NBEA, PBEA, BEART, BE, THGBART, THB, XVW0CMS, AUON7MRK, N6YBQLFI, O2SAT, FIO2    Radiology:      Assessment:        Primary Problem  Principal Problem:    Multiple and open wound of lower limb, complicated, unspecified laterality, initial encounter  Active Problems:    Blood loss anemia    Hyperkalemia    SAM (acute kidney injury) ([facility] [address]    Rectal bleeding    Bilateral leg pain    Acute renal failure (HCC)    Metabolic acidosis    Hypocalcemia    Hypoalbuminemia    Heart attack ([facility] [address]    Infected skin ulcer limited to breakdown of skin (HCC)    Hyponatremia  Resolved Problems:    * No resolved hospital problems.  *      Plan:        Transfer to stepdown when cleared by other services  Wound care  Antibiotics per C&S / Infectious Disease - Zyvox added, Vanco DC'd  Correct electrolyte abnormalities  Check bun and creatinine  Repeat CPK  Will monitor and control Blood Pressure  Cardiology evaluation - reviewed with Dr. Cruz Howard  Transfuse as needed  Iron supplementation as needed (ferritin 32)  GI evaluation - EGD and colonoscopy to be arranged, check occult blood    IP CONSULT TO HOSPITALIST  IP CONSULT TO INFECTIOUS DISEASES  PHARMACY TO DOSE VANCOMYCIN  IP CONSULT TO VASCULAR SURGERY  IP CONSULT TO NEPHROLOGY  IP CONSULT TO GI  IP CONSULT TO GENERAL SURGERY  IP CONSULT TO IV TEAM  IP CONSULT TO CARDIOLOGY  IP CONSULT TO DO Farrah  9/25/2018  1:15 PM

## 2018-09-25 NOTE — PROGRESS NOTES
32       BMP  Recent Labs      09/23/18   1732   09/24/18   0515   09/24/18   1726  09/24/18   2342  09/25/18   0411   NA  130*  134*   < >  135   < >  134*  132*  134*   K  5.5*   --   5.3   --    --    --   4.9   CL  98   --   104   --    --    --   99   CO2  18*   --   21   --    --    --   25   BUN  54*   --   45*   --    --    --   37*   CREATININE  2.51*   --   2.30*   --    --    --   2.28*   GLUCOSE  81   --   94   --    --    --   90   CALCIUM  7.8*   --   8.0*   --    --    --   7.7*    < > = values in this interval not displayed. LFTS  Recent Labs      09/23/18   0351  09/24/18   0515   ALKPHOS  86  80   ALT  7  18   AST  15  85*   BILITOT  0.16*  0.50   LABALBU  2.3*  2.3*       AMYLASE/LIPASE/AMMONIA  No results for input(s): AMYLASE, LIPASE, AMMONIA in the last 72 hours. PT/INR  Recent Labs      09/23/18   1047  09/24/18   0515   PROTIME  10.8  10.9   INR  1.0  1.0       CEA:  No results for input(s): CEA in the last 72 hours. Ca 125:  No results for input(s):  in the last 72 hours. Ca 19-9:   Invalid input(s):   AFP: No results for input(s): AFP in the last 72 hours. Lactic acid:Invalid input(s): LACTIC ACID   Radiology Review:        ASSESSMENT and Plan:  1. Severe anemia-appears to be mixed picture of anemia of chronic disease and acute blood loss to BLE wound, NSAID induced PUD, and possible malignancy. Pt denies past endoscopies. Pt reports hematochezia and melena but not episodes since admission. Hgb continues to fluctuate    -Once stable, Pt will need EGD and colonoscopy-appropriate time to be determined  -Rec PPI drip once access/compatibility is obtained. Until then PPI IV BID  -Monitor for s/s of GI blood loss. -Trend Hgb and transfuse to maintain Hgb>7  -will continue to closely flollow and monitor    Thank you for allowing me to participate in the care of your patient. Please feel free to contact me with any questions or concerns.      DESHAWN Gutierrez - CNP

## 2018-09-25 NOTE — PROGRESS NOTES
wounds. Routine incontinence care with incontinence barrier cloths and zinc oxide cream.     Apply zinc oxide cream BID and prn incontinence to the gluteal crease. Moisture wicking under pads vs cloth backed briefs    Care of the BLE wounds per surgeons with instructions for care in place. Acell (porcine-derived, naturally-occurring lyophilized extracellular matrix) has been applied. Further wound care will be dependant upon amount of exudate. Will follow for assistance if needed.     Specialty Bed Required : Yes   [x] Low Air Loss   [x] Pressure Redistribution  [] Fluid Immersion  [x] Bariatric  [] Total Pressure Relief  [] Other:     Discharge Plan:  TBD    Patient/Caregiver Teaching:     [] Indicates understanding       [] Needs reinforcement  [] Unsuccessful      [x] Verbal Understanding  [] Demonstrated understanding       [] No evidence of learning  [] Refused teaching         [] N/A       Electronically signed by Erika Reed RN, CWON on 9/25/2018 at 11:57 AM

## 2018-09-25 NOTE — OP NOTE
mL.    COMPLICATIONS:  None. SPECIMENS:  None. DRAINS:  None. INDICATION:  The patient is a morbidly obese 66-year-old black female,  admitted to the hospital with neglected bilateral lower extremity  ulcerations with large amounts of eschar and necrotic debris requiring  sharp excisional debridement of same. DESCRIPTION OF PROCEDURE:  After the induction of general anesthesia, both  lower extremities were unwrapped and undressed, and both lower extremities  were prepped and draped in the usual sterile fashion. The anterolateral  and right posterior ulcerations along with the left medial and left lateral  ulcerations were all found to have necrotic eschar requiring sharp  excisional debridement using scalpel and forceps, and these were sharply  excised to pink healthy tissue circumferentially on both the right and left  sides, and excess granulation tissue was removed using a uterine curette  back to pink healthy subcutaneous tissue. After this performed, hemostasis  was accomplished using electrocautery, and all wounds were irrigated with  Simpulse irrigation with bacitracin, and at this point, application of  acellular xenograft was performed using MicroMatrix 1000 mg x2, and Cytal  three-layered wound sheet of 7 x 10 cm and three-layered wound sheet of 10  x 15 layers were then sutured to the wound with interrupted sutures of 3-0  Vicryl circumferentially to all wounds. MicroMatrix manufactured by  [employer]. Wounds were then covered with Adaptic and Surgilube. Kerlix rolls  and Ace wrap were then applied, and the patient was taken to the recovery  room in satisfactory condition. All sponge, needle, and instrument counts  were reported correct at the end of the case. Trudi Haskins    D: 09/24/2018 15:41:22       T: 09/24/2018 15:44:14     /[email or web address]  Job#: [id number]     Doc#: [id number]    CC:  Brittney Chamberlain

## 2018-09-25 NOTE — PROGRESS NOTES
[facility]  Progress Note    Quin Ferguson  MRN: [id number]  Date: 9/25/2018    Reason for F/U :   Multiple Infected wounds    Impression :   1. Multiple bilateral lower extremity wounds with evidence of necrosis and soft tissue infection. · Status post debridement 9/24/18  2. Acute blood loss anemia   · status post 4 units of PRBC transfusion  3. Morbid obesity  4. Resolved hypotension  5. Acute kidney injury  6. Contaminated blood culture    Recommendations:   · Continue broad-spectrum antimicrobial therapy with Zosyn and I will switch from the vancomycin to linezolid given the acute renal failure  · The blood culture data remains negative but there were no surgical culture sent  · Continue wound care the patient did have some form of grafting done and the dressings will not be changed until 9/27/18  · Once the surgical sites have been evaluated we will decide on the antibiotic choice and duration. Infection Control Recommendations:   Universal precautions    Discharge Planning:   Estimated Length of IV antimicrobials: To be determined  Patient will need Midline Catheter Insertion/ PICC line Insertion: No  Patient [facility] , [facility],  [facility],  LTAC: Undetermined  Patient will need outpatient wound care: Yes    Medical Decision making / Summary of Stay:   The patient is a 77 y.o. female with significant past medical history of remote coronary artery disease and obesity. Patient has had bilateral lower extremity ulcerations for several months. The wounds gradually enlarged.  The patient's appetite became poor. She also had nausea but never vomited. She had chills but no fever. The family members urged her to come to the ER  In the emergency room she was found to have significant anemia, leukocytosis, acute renal failure and hyperkalemia. She was initially admitted to the floor and subsequently transferred to the intensive care unit after she had hypotension.     Current 132*  134*   K  5.3   --    --   4.9   CL  104   --    --   99   CO2  21   --    --   25   BUN  45*   --    --   37*   CREATININE  2.30*   --    --   2.28*    < > = values in this interval not displayed. Hepatic Function Panel:   Recent Labs      09/23/18   0351  09/24/18   0515   PROT  6.9  6.6   LABALBU  2.3*  2.3*   BILITOT  0.16*  0.50   ALKPHOS  86  80   ALT  7  18   AST  15  85*     No results for input(s): VANCSARITAOUGH in the last 72 hours. Lab Results   Component Value Date    CRP 62.6 (H) 09/23/2018     No results found for: SEDRATE    Imaging Studies:   TWO XRAY VIEWS OF THE LEFT TIBIA AND FIBULA 9/23/2018 3:27 am   FINDINGS:   1. No fracture or dislocation identified. 2. No radiographic evidence of osteomyelitis. TWO XRAY VIEWS OF THE RIGHT TIBIA AND FIBULA 9/23/2018 3:27 am   FINDINGS:   1.  No radiographic evidence of fracture or dislocation identified. 2. Findings suggestive of moderate to severe degenerative osteoarthritis       SINGLE XRAY VIEW OF THE CHEST 9/23/2018 10:38 am   FINDINGS:  Cardiomegaly. No x-ray evidence of CHF. There appears to be \"water bottle\" shape of the heart. Need echocardiography to evaluate possible pericardial fluid should be determined clinically. RETROPERITONEAL ULTRASOUND OF THE KIDNEYS AND URINARY BLADDER 9/23/2018   FINDINGS:   1. No focal abnormality related to either kidney. 2. Sonographer questions the presence of uterine fibroids. Cultures:     Culture Blood #1 [[id number]] (Abnormal) Collected: 09/23/18 0446   Order Status: Completed Specimen: Blood Updated: 09/25/18 0918    Specimen Description . BLOOD    Special Requests RT UPPER ARM 4 ML    Culture POSITIVE BLOOD CULTURE, RN NOTIFIED: Julia KUMAR AT 1287 09/24/2018 (A)    Culture DIRECT GRAM STAIN FROM BOTTLE: GRAM POSITIVE RODS    Culture DIPHTHEROIDS A single positive blood culture of coagulase negative staphylococci, (A)    Culture  micrococci, diphtheroids or Bacillus species should be and the key elements of all parts of the encounter have been performed by me. I agree with the assessment, plan and orders as documented by the resident. Gina Thornton M.D. Perfect [employer] messaging [phone]        This note is created with the assistance of a speech recognition program.  While intending to generate a document that actually reflects the content of the visit, the document can still have some errors including those of syntax and sound a like substitutions which may escape proof reading. It such instances, actual meaning can be extrapolated by contextual diversion.

## 2018-09-25 NOTE — ANESTHESIA POSTPROCEDURE EVALUATION
Department of Anesthesiology  Postprocedure Note    Patient: Ron Johnson  MRN: [id number]  [place]: 1950  Date of evaluation: 9/25/2018  Time:  11:46 AM     Procedure Summary     Date:  09/24/18 Room / Location:  [facility] OR [address] [place] [address] OR    Anesthesia Start:  6746 Anesthesia Stop:  2753    Procedure:  SHARP EXCISIONAL DEBRIDEMENT BILATERAL LOWER EXTREMITIES ULCERS X4 WITH SCALPEL AND FORCEPS (Bilateral ) Diagnosis:  (WOUNDS TO LOWER EXTREMETIES)    Surgeon:  David Rowley DO Responsible Provider:  Omayra Epstein MD    Anesthesia Type:  general ASA Status:  4          Anesthesia Type: general    Addie Phase I: Addie Score: 8    Addie Phase II:      Last vitals: Reviewed and per EMR flowsheets.        Anesthesia Post Evaluation    Patient location during evaluation: PACU  Patient participation: complete - patient participated  Level of consciousness: awake  Pain score: 3  Airway patency: patent  Nausea & Vomiting: no vomiting and no nausea  Complications: no  Cardiovascular status: blood pressure returned to baseline  Respiratory status: acceptable  Hydration status: euvolemic

## 2018-09-25 NOTE — PROGRESS NOTES
[facility]   Progress Note                    Date:   9/25/2018  Patient name:  Navneet Disla  Date of admission:  9/23/2018  1:29 AM  MRN:   [id number]  YOB: 1950  PCP:    No primary care provider on file. Reason for Admission:  Multiple and open wound of lower limb, complicated, unspecified laterality, initial encounter [J98.585F]    Subjective:       Clinical Changes / Abnormalities:    Patient underwent excisional debridement of b/l LE. No acute issues overnight. Abdominal pain has improved compared to yesterday, continues to have nausea    I/O last 3 completed shifts: In: 2664 [I.V.:3985; Blood:344]  Out: 3321 [Urine:1420; Blood:100]  I/O this shift:   In: 427 [I.V.:427]  Out: 100 [Urine:100]      In: 3763 [I.V.:3763]  Out: 1285 [Urine:1285]      Intake/Output Summary (Last 24 hours) at 09/25/18 1319  Last data filed at 09/25/18 0900   Gross per 24 hour   Intake             3763 ml   Output             1385 ml   Net             2378 ml         I/O since admission: +8 liters    Medications:   Scheduled Meds:   sodium chloride  250 mL Intravenous Once    metoprolol tartrate  12.5 mg Oral BID    furosemide  80 mg Intravenous BID    linezolid  600 mg Oral 2 times per day    piperacillin-tazobactam  3.375 g Intravenous Q12H    iron sucrose  300 mg Intravenous Daily    darbepoetin reva-polysorbate  60 mcg Subcutaneous Weekly    vitamin C  500 mg Oral Daily    pantoprazole (PROTONIX) bolus  80 mg Intravenous Once    pantoprazole  40 mg Intravenous BID    And    sodium chloride (PF)  10 mL Intravenous Daily    sodium hypochlorite   Irrigation Daily    calcium gluconate  1 g Intravenous Once    iron sucrose  200 mg Intravenous Q24H     Continuous Infusions:   dextrose      dextrose       CBC:   Recent Labs      09/23/18   1047   09/24/18   0515   09/24/18   2342  09/25/18   0411  09/25/18   1222   WBC  26.7*   --   20.7*   --    --   17.8*   --    HGB  6.5*   < >  7.7*

## 2018-09-25 NOTE — PROGRESS NOTES
7.7*   < >  8.5*  6.9*  7.8*   HCT  23.1*   < >  25.5*   < >  29.0*  23.8*  25.3*   MCV  78.3*   --   76.1*   --    --    --   81.1*   MCH  22.0*   --   23.0*   --    --    --   25.0*   MCHC  28.1*   --   30.2   --    --    --   30.8   RDW  21.5*   --   20.8*   --    --    --   21.5*   PLT  444   --   355   --    --    --   292   MPV  9.6   --   9.2   --    --    --   8.9    < > = values in this interval not displayed. BMP:   Recent Labs      09/23/18   1732   09/24/18   0515   09/24/18   1726  09/24/18   2342  09/25/18   0411   NA  130*  134*   < >  135   < >  134*  132*  134*   K  5.5*   --   5.3   --    --    --   4.9   CL  98   --   104   --    --    --   99   CO2  18*   --   21   --    --    --   25   BUN  54*   --   45*   --    --    --   37*   CREATININE  2.51*   --   2.30*   --    --    --   2.28*   GLUCOSE  81   --   94   --    --    --   90   CALCIUM  7.8*   --   8.0*   --    --    --   7.7*    < > = values in this interval not displayed. Phosphorus:     Recent Labs      09/24/18   0515   PHOS  3.8     Magnesium:  No results for input(s): MG in the last 72 hours. Albumin:    Recent Labs      09/23/18   0351  09/24/18   0515   LABALBU  2.3*  2.3*     BNP:    No results found for: BNP  SALMA:      Lab Results   Component Value Date    SALMA NEGATIVE 09/23/2018     SPEP:  Lab Results   Component Value Date    PROT 6.6 09/24/2018    PATH ELECTRONICALLY SIGNED.  Prerna Ortega M.D. 09/23/2018     UPEP:   No results found for: LABPE  C3:     Lab Results   Component Value Date    C3 124 09/23/2018     C4:     Lab Results   Component Value Date    C4 19 09/23/2018     MPO ANCA:   No results found for: MPO  PR3 ANCA:   No results found for: PR3  Anti-GBM:   No results found for: GBMABIGG  Hep BsAg:         Lab Results   Component Value Date    HEPBSAG NONREACTIVE 09/23/2018     Hep C AB:          Lab Results   Component Value Date    HEPCAB NONREACTIVE 09/23/2018       Urinalysis/Chemistries:      Lab

## 2018-09-26 PROBLEM — M62.82 NON-TRAUMATIC RHABDOMYOLYSIS: Status: ACTIVE | Noted: 2018-09-26

## 2018-09-26 LAB
ABO/RH: NORMAL
ABSOLUTE EOS #: 0.52 K/UL (ref 0–0.4)
ABSOLUTE IMMATURE GRANULOCYTE: 0 K/UL (ref 0–0.3)
ABSOLUTE LYMPH #: 2.61 K/UL (ref 1–4.8)
ABSOLUTE MONO #: 1.04 K/UL (ref 0.1–0.8)
ABSOLUTE RETIC #: 0.09 M/UL (ref 0.03–0.08)
ANION GAP SERPL CALCULATED.3IONS-SCNC: 12 MMOL/L (ref 9–17)
ANION GAP SERPL CALCULATED.3IONS-SCNC: 13 MMOL/L (ref 9–17)
ANTIBODY SCREEN: NEGATIVE
ARM BAND NUMBER: NORMAL
BASOPHILS # BLD: 1 % (ref 0–2)
BASOPHILS ABSOLUTE: 0.17 K/UL (ref 0–0.2)
BLD PROD TYP BPU: NORMAL
BUN BLDV-MCNC: 33 MG/DL (ref 8–23)
BUN BLDV-MCNC: 34 MG/DL (ref 8–23)
BUN/CREAT BLD: ABNORMAL (ref 9–20)
BUN/CREAT BLD: ABNORMAL (ref 9–20)
CALCIUM IONIZED: 0.94 MMOL/L (ref 1.13–1.33)
CALCIUM SERPL-MCNC: 7.7 MG/DL (ref 8.6–10.4)
CALCIUM SERPL-MCNC: 7.9 MG/DL (ref 8.6–10.4)
CHLORIDE BLD-SCNC: 96 MMOL/L (ref 98–107)
CHLORIDE BLD-SCNC: 96 MMOL/L (ref 98–107)
CO2: 26 MMOL/L (ref 20–31)
CO2: 28 MMOL/L (ref 20–31)
CREAT SERPL-MCNC: 2.35 MG/DL (ref 0.5–0.9)
CREAT SERPL-MCNC: 2.44 MG/DL (ref 0.5–0.9)
CROSSMATCH RESULT: NORMAL
CULTURE: ABNORMAL
DIFFERENTIAL TYPE: ABNORMAL
DISPENSE STATUS BLOOD BANK: NORMAL
EKG ATRIAL RATE: 70 BPM
EKG ATRIAL RATE: 75 BPM
EKG P AXIS: 37 DEGREES
EKG P AXIS: 51 DEGREES
EKG P-R INTERVAL: 146 MS
EKG P-R INTERVAL: 154 MS
EKG Q-T INTERVAL: 370 MS
EKG Q-T INTERVAL: 396 MS
EKG QRS DURATION: 84 MS
EKG QRS DURATION: 94 MS
EKG QTC CALCULATION (BAZETT): 413 MS
EKG QTC CALCULATION (BAZETT): 427 MS
EKG R AXIS: -10 DEGREES
EKG R AXIS: -9 DEGREES
EKG T AXIS: 165 DEGREES
EKG T AXIS: 168 DEGREES
EKG VENTRICULAR RATE: 70 BPM
EKG VENTRICULAR RATE: 75 BPM
EOSINOPHILS RELATIVE PERCENT: 3 % (ref 1–4)
EXPIRATION DATE: NORMAL
GFR AFRICAN AMERICAN: 24 ML/MIN
GFR AFRICAN AMERICAN: 25 ML/MIN
GFR NON-AFRICAN AMERICAN: 20 ML/MIN
GFR NON-AFRICAN AMERICAN: 20 ML/MIN
GFR SERPL CREATININE-BSD FRML MDRD: ABNORMAL ML/MIN/{1.73_M2}
GLUCOSE BLD-MCNC: 108 MG/DL (ref 70–99)
GLUCOSE BLD-MCNC: 99 MG/DL (ref 70–99)
HCT VFR BLD CALC: 24.2 % (ref 36.3–47.1)
HCT VFR BLD CALC: 26 % (ref 36.3–47.1)
HEMOGLOBIN: 7.4 G/DL (ref 11.9–15.1)
HEMOGLOBIN: 7.7 G/DL (ref 11.9–15.1)
IMMATURE GRANULOCYTES: 0 %
IMMATURE RETIC FRACT: 38.2 % (ref 2.7–18.3)
LACTIC ACID, WHOLE BLOOD: 0.9 MMOL/L (ref 0.7–2.1)
LACTIC ACID: NORMAL MMOL/L
LYMPHOCYTES # BLD: 15 % (ref 24–44)
Lab: ABNORMAL
MAGNESIUM: 2.2 MG/DL (ref 1.6–2.6)
MCH RBC QN AUTO: 24.7 PG (ref 25.2–33.5)
MCHC RBC AUTO-ENTMCNC: 29.6 G/DL (ref 28.4–34.8)
MCV RBC AUTO: 83.3 FL (ref 82.6–102.9)
MONOCYTES # BLD: 6 % (ref 1–7)
MORPHOLOGY: ABNORMAL
NRBC AUTOMATED: 0.6 PER 100 WBC
NUCLEATED RED BLOOD CELLS: 1 PER 100 WBC
PDW BLD-RTO: 22.7 % (ref 11.8–14.4)
PLATELET # BLD: 317 K/UL (ref 138–453)
PLATELET ESTIMATE: ABNORMAL
PMV BLD AUTO: 9 FL (ref 8.1–13.5)
POTASSIUM SERPL-SCNC: 3.7 MMOL/L (ref 3.7–5.3)
POTASSIUM SERPL-SCNC: 3.8 MMOL/L (ref 3.7–5.3)
RBC # BLD: 3.12 M/UL (ref 3.95–5.11)
RBC # BLD: ABNORMAL 10*6/UL
RETIC %: 3 % (ref 0.5–1.9)
RETIC HEMOGLOBIN: 24.5 PG (ref 28.2–35.7)
SEG NEUTROPHILS: 75 % (ref 36–66)
SEGMENTED NEUTROPHILS ABSOLUTE COUNT: 13.06 K/UL (ref 1.8–7.7)
SODIUM BLD-SCNC: 135 MMOL/L (ref 135–144)
SODIUM BLD-SCNC: 136 MMOL/L (ref 135–144)
SPECIMEN DESCRIPTION: ABNORMAL
STATUS: ABNORMAL
TOTAL CK: 533 U/L (ref 26–192)
TRANSFUSION STATUS: NORMAL
UNIT DIVISION: 0
UNIT NUMBER: NORMAL
WBC # BLD: 17.4 K/UL (ref 3.5–11.3)
WBC # BLD: ABNORMAL 10*3/UL

## 2018-09-26 PROCEDURE — 97162 PT EVAL MOD COMPLEX 30 MIN: CPT

## 2018-09-26 PROCEDURE — 6370000000 HC RX 637 (ALT 250 FOR IP): Performed by: EMERGENCY MEDICINE

## 2018-09-26 PROCEDURE — 51702 INSERT TEMP BLADDER CATH: CPT

## 2018-09-26 PROCEDURE — 82330 ASSAY OF CALCIUM: CPT

## 2018-09-26 PROCEDURE — G8988 SELF CARE GOAL STATUS: HCPCS

## 2018-09-26 PROCEDURE — C9113 INJ PANTOPRAZOLE SODIUM, VIA: HCPCS | Performed by: NURSE PRACTITIONER

## 2018-09-26 PROCEDURE — 99232 SBSQ HOSP IP/OBS MODERATE 35: CPT | Performed by: INTERNAL MEDICINE

## 2018-09-26 PROCEDURE — 80048 BASIC METABOLIC PNL TOTAL CA: CPT

## 2018-09-26 PROCEDURE — 97166 OT EVAL MOD COMPLEX 45 MIN: CPT

## 2018-09-26 PROCEDURE — 6370000000 HC RX 637 (ALT 250 FOR IP): Performed by: INTERNAL MEDICINE

## 2018-09-26 PROCEDURE — 82550 ASSAY OF CK (CPK): CPT

## 2018-09-26 PROCEDURE — 6370000000 HC RX 637 (ALT 250 FOR IP): Performed by: STUDENT IN AN ORGANIZED HEALTH CARE EDUCATION/TRAINING PROGRAM

## 2018-09-26 PROCEDURE — 85014 HEMATOCRIT: CPT

## 2018-09-26 PROCEDURE — 85025 COMPLETE CBC W/AUTO DIFF WBC: CPT

## 2018-09-26 PROCEDURE — 2580000003 HC RX 258: Performed by: INTERNAL MEDICINE

## 2018-09-26 PROCEDURE — 85045 AUTOMATED RETICULOCYTE COUNT: CPT

## 2018-09-26 PROCEDURE — 97530 THERAPEUTIC ACTIVITIES: CPT

## 2018-09-26 PROCEDURE — 94762 N-INVAS EAR/PLS OXIMTRY CONT: CPT

## 2018-09-26 PROCEDURE — 6370000000 HC RX 637 (ALT 250 FOR IP): Performed by: HOSPITALIST

## 2018-09-26 PROCEDURE — G8987 SELF CARE CURRENT STATUS: HCPCS

## 2018-09-26 PROCEDURE — 36415 COLL VENOUS BLD VENIPUNCTURE: CPT

## 2018-09-26 PROCEDURE — 6360000002 HC RX W HCPCS: Performed by: NURSE PRACTITIONER

## 2018-09-26 PROCEDURE — 2580000003 HC RX 258: Performed by: STUDENT IN AN ORGANIZED HEALTH CARE EDUCATION/TRAINING PROGRAM

## 2018-09-26 PROCEDURE — 6370000000 HC RX 637 (ALT 250 FOR IP): Performed by: SURGERY

## 2018-09-26 PROCEDURE — 83735 ASSAY OF MAGNESIUM: CPT

## 2018-09-26 PROCEDURE — 6370000000 HC RX 637 (ALT 250 FOR IP): Performed by: NURSE PRACTITIONER

## 2018-09-26 PROCEDURE — 6360000002 HC RX W HCPCS: Performed by: INTERNAL MEDICINE

## 2018-09-26 PROCEDURE — 97535 SELF CARE MNGMENT TRAINING: CPT

## 2018-09-26 PROCEDURE — 83605 ASSAY OF LACTIC ACID: CPT

## 2018-09-26 PROCEDURE — G8979 MOBILITY GOAL STATUS: HCPCS

## 2018-09-26 PROCEDURE — 85018 HEMOGLOBIN: CPT

## 2018-09-26 PROCEDURE — 2580000003 HC RX 258: Performed by: NURSE PRACTITIONER

## 2018-09-26 PROCEDURE — 6360000002 HC RX W HCPCS: Performed by: STUDENT IN AN ORGANIZED HEALTH CARE EDUCATION/TRAINING PROGRAM

## 2018-09-26 PROCEDURE — G8978 MOBILITY CURRENT STATUS: HCPCS

## 2018-09-26 PROCEDURE — 2060000000 HC ICU INTERMEDIATE R&B

## 2018-09-26 RX ORDER — ZINC SULFATE 50(220)MG
220 CAPSULE ORAL DAILY
Status: DISCONTINUED | OUTPATIENT
Start: 2018-09-26 | End: 2018-10-05 | Stop reason: HOSPADM

## 2018-09-26 RX ORDER — FUROSEMIDE 10 MG/ML
80 INJECTION INTRAMUSCULAR; INTRAVENOUS DAILY
Status: DISCONTINUED | OUTPATIENT
Start: 2018-09-27 | End: 2018-09-28

## 2018-09-26 RX ORDER — M-VIT,TX,IRON,MINS/CALC/FOLIC 27MG-0.4MG
1 TABLET ORAL DAILY
Status: DISCONTINUED | OUTPATIENT
Start: 2018-09-26 | End: 2018-10-05 | Stop reason: HOSPADM

## 2018-09-26 RX ADMIN — IRON SUCROSE 300 MG: 20 INJECTION, SOLUTION INTRAVENOUS at 08:35

## 2018-09-26 RX ADMIN — Medication 500 MG: at 08:35

## 2018-09-26 RX ADMIN — IRON SUCROSE 200 MG: 20 INJECTION, SOLUTION INTRAVENOUS at 21:54

## 2018-09-26 RX ADMIN — Medication 1 G: at 16:01

## 2018-09-26 RX ADMIN — METOPROLOL TARTRATE 25 MG: 25 TABLET ORAL at 12:40

## 2018-09-26 RX ADMIN — PIPERACILLIN AND TAZOBACTAM 3.38 G: 3; .375 INJECTION, POWDER, LYOPHILIZED, FOR SOLUTION INTRAVENOUS; PARENTERAL at 04:26

## 2018-09-26 RX ADMIN — METOPROLOL TARTRATE 25 MG: 25 TABLET ORAL at 21:54

## 2018-09-26 RX ADMIN — OXYCODONE HYDROCHLORIDE 15 MG: 5 TABLET ORAL at 15:58

## 2018-09-26 RX ADMIN — PANTOPRAZOLE SODIUM 40 MG: 40 INJECTION, POWDER, FOR SOLUTION INTRAVENOUS at 21:54

## 2018-09-26 RX ADMIN — Medication 220 MG: at 12:40

## 2018-09-26 RX ADMIN — LINEZOLID 600 MG: 600 TABLET, FILM COATED ORAL at 21:54

## 2018-09-26 RX ADMIN — OXYCODONE HYDROCHLORIDE 10 MG: 5 TABLET ORAL at 00:04

## 2018-09-26 RX ADMIN — MULTIPLE VITAMINS W/ MINERALS TAB 1 TABLET: TAB at 18:00

## 2018-09-26 RX ADMIN — FUROSEMIDE 80 MG: 10 INJECTION, SOLUTION INTRAMUSCULAR; INTRAVENOUS at 08:35

## 2018-09-26 RX ADMIN — OXYCODONE HYDROCHLORIDE 10 MG: 5 TABLET ORAL at 09:16

## 2018-09-26 RX ADMIN — POLYETHYLENE GLYCOL 3350 17 G: 17 POWDER, FOR SOLUTION ORAL at 12:40

## 2018-09-26 RX ADMIN — LINEZOLID 600 MG: 600 TABLET, FILM COATED ORAL at 08:36

## 2018-09-26 RX ADMIN — PANTOPRAZOLE SODIUM 40 MG: 40 INJECTION, POWDER, FOR SOLUTION INTRAVENOUS at 08:35

## 2018-09-26 RX ADMIN — PIPERACILLIN AND TAZOBACTAM 3.38 G: 3; .375 INJECTION, POWDER, LYOPHILIZED, FOR SOLUTION INTRAVENOUS; PARENTERAL at 17:56

## 2018-09-26 ASSESSMENT — PAIN DESCRIPTION - ORIENTATION
ORIENTATION: RIGHT;LEFT
ORIENTATION: RIGHT;LEFT;LOWER

## 2018-09-26 ASSESSMENT — PAIN SCALES - GENERAL
PAINLEVEL_OUTOF10: 9
PAINLEVEL_OUTOF10: 6
PAINLEVEL_OUTOF10: 10
PAINLEVEL_OUTOF10: 10
PAINLEVEL_OUTOF10: 0

## 2018-09-26 ASSESSMENT — ENCOUNTER SYMPTOMS
GASTROINTESTINAL NEGATIVE: 1
SORE THROAT: 0
RESPIRATORY NEGATIVE: 1
NAUSEA: 0
BLURRED VISION: 0
VOMITING: 0
SHORTNESS OF BREATH: 1
ORTHOPNEA: 0
ABDOMINAL PAIN: 0
SHORTNESS OF BREATH: 0
BACK PAIN: 0
PHOTOPHOBIA: 0
SPUTUM PRODUCTION: 0
COUGH: 0

## 2018-09-26 ASSESSMENT — PAIN DESCRIPTION - DESCRIPTORS: DESCRIPTORS: ACHING;BURNING

## 2018-09-26 ASSESSMENT — PAIN DESCRIPTION - PROGRESSION: CLINICAL_PROGRESSION: NOT CHANGED

## 2018-09-26 ASSESSMENT — PAIN DESCRIPTION - FREQUENCY: FREQUENCY: CONTINUOUS

## 2018-09-26 ASSESSMENT — PAIN DESCRIPTION - ONSET: ONSET: ON-GOING

## 2018-09-26 ASSESSMENT — PAIN DESCRIPTION - PAIN TYPE
TYPE: SURGICAL PAIN
TYPE: SURGICAL PAIN;ACUTE PAIN

## 2018-09-26 ASSESSMENT — PAIN DESCRIPTION - LOCATION
LOCATION: LEG
LOCATION: LEG

## 2018-09-26 NOTE — PROGRESS NOTES
is not sure why says that she might have had black color of stools for some time. \"     The patient was transferred here because of low blood pressure  Wound care was initiated  The patient will be transfused to maintain hemoglobin greater than 7.0  Cardiology was consulted  Antibiotics were ordered  Her renal function was carefully monitored  Blood pressure was stabilized  Electrolyte abnormalities were addressed    The patient underwent wound debridement on 9/24  The patient does report that she has stopped many of her home meds? Past Medical History:   has a past medical history of SAM (acute kidney injury) ([facility] [address] Heart attack ([facility] [address] and Skin lesions. Social History:   reports that she has quit smoking. She has quit using smokeless tobacco. She reports that she does not drink alcohol or use drugs. Family History:   Family History   Problem Relation Age of Onset    Adopted: Yes    Cancer Mother        Medications: Allergies:     Allergies   Allergen Reactions    Fruit & Vegetable Daily [Nutritional Supplements] Anaphylaxis     After ingesting nectarines    Aspirin Nausea And Vomiting       Current Meds:   Scheduled Meds:    zinc sulfate  220 mg Oral Daily    therapeutic multivitamin-minerals  1 tablet Oral Daily    nicotine  1 patch Transdermal Daily    furosemide  80 mg Intravenous BID    linezolid  600 mg Oral 2 times per day    metoprolol tartrate  25 mg Oral BID    polyethylene glycol  17 g Oral Daily    piperacillin-tazobactam  3.375 g Intravenous Q12H    darbepoetin reva-polysorbate  60 mcg Subcutaneous Weekly    vitamin C  500 mg Oral Daily    pantoprazole  40 mg Intravenous BID    And    sodium chloride (PF)  10 mL Intravenous Daily    sodium hypochlorite   Irrigation Daily    calcium gluconate  1 g Intravenous Once    iron sucrose  200 mg Intravenous Q24H     Continuous Infusions:    dextrose      dextrose       PRN Meds: oxyCODONE **OR** oxyCODONE, bisacodyl, polyethylene [id number], HCO3, NBEA, PBEA, BEART, BE, THGBART, THB, USZ3UJV, PDDW1MHJ, X2ELEMDL, O2SAT, FIO2    Radiology:      Assessment:        Primary Problem  Principal Problem:    Multiple and open wound of lower limb, complicated, unspecified laterality, initial encounter  Active Problems:    Blood loss anemia    Hyperkalemia    SAM (acute kidney injury) ([address]    Rectal bleeding    Bilateral leg pain    Acute renal failure (HCC)    Metabolic acidosis    Hypocalcemia    Hypoalbuminemia    Heart attack ([address]    Infected skin ulcer limited to breakdown of skin (HCC)    Hyponatremia    Non-traumatic rhabdomyolysis  Resolved Problems:    * No resolved hospital problems.  *      Plan:        Surgical evaluation  / Wound care  Antibiotics per C&S / Infectious Disease - Zyvox, Zosyn  Correct electrolyte abnormalities  Check bun and creatinine, maintain Keyes for now  Monitor CPK - rhabdomyolysis  Will monitor and control Blood Pressure  Cardiology evaluation - nonspecific troponin elevation  Transfuse as needed and observe for GI bleed, Aranesp as ordered    Iron supplementation as needed (ferritin 32)  GI evaluation - EGD and colonoscopy to be arranged, check occult blood  Laxatives as needed for constipation    IP CONSULT TO HOSPITALIST  IP CONSULT TO INFECTIOUS DISEASES  PHARMACY TO DOSE VANCOMYCIN  IP CONSULT TO VASCULAR SURGERY  IP CONSULT TO NEPHROLOGY  IP CONSULT TO GI  IP CONSULT TO GENERAL SURGERY  IP CONSULT TO IV TEAM  IP CONSULT TO CARDIOLOGY  IP CONSULT TO DO Farrah  9/26/2018  2:47 PM

## 2018-09-26 NOTE — PROGRESS NOTES
[facility]  Progress Note    Curtis Gamble  MRN: [id number]  Date: 9/26/2018    Reason for F/U :   Multiple Infected wounds  Impression :   1. Multiple bilateral lower extremity wounds with evidence of necrosis and soft tissue infection. · Status post debridement 9/24/18  · Leukocytosis. -trending down. 2. Acute blood loss anemia   · status post 4 units of PRBC transfusion  3. Morbid obesity  4. Resolved hypotension  5. Acute kidney injury  6. Contaminated blood culture    Recommendations:   · Zosyn 3.375 Q 8 hours. Day 4.   · Linezolid 600 mg PO. Day two. · Vancomycin d/c secondary to SAM. Total 2 doses. · Will re access need for grant soon. Patient does not ambulate well and patient undergoing aggressive diuresis. Surgery does not want skin grafts contaminated. · The blood culture data remains negative but there were no surgical culture sent  · Continue wound care the patient did have some form of grafting done and the dressings will not be changed until 9/27/18  · Once the surgical sites have been evaluated we will decide on the antibiotic choice and duration. Infection Control Recommendations:   Universal precautions    Discharge Planning:   Estimated Length of IV antimicrobials: To be determined  Patient will need Midline Catheter Insertion/ PICC line Insertion: No  Patient [facility] , [facility],  [facility],  LTAC: Undetermined  Patient will need outpatient wound care: Yes    Medical Decision making / Summary of Stay:   The patient is a 77 y.o. female with significant past medical history of remote coronary artery disease and obesity. Patient has had bilateral lower extremity ulcerations for several months. The wounds gradually enlarged.  The patient's appetite became poor. She also had nausea but never vomited. She had chills but no fever.  The family members urged her to come to the ER  In the emergency room she was found to have significant anemia, leukocytosis, acute

## 2018-09-26 NOTE — PROGRESS NOTES
needed  2. Analgesia:  Pain control per primary  3. Continue medical management and supportive care per primary. Continue Linezolid and Zosyn. 4. GI prophylaxis: Protonix, ok for diet from gen surg perspective   5. Continue to encourage incentive spirometry  6. Encourage work with PT/OT    Electronically signed by Estefania Chowdhury  on 9/26/2018 at 5:50 AM     Patient seen and examined at bedside. Changes made to above note as needed including assessment and plan.      Electronically signed by Jeramie Cassidy DO on 9/26/2018 at 6:13 AM

## 2018-09-26 NOTE — PROGRESS NOTES
09/24/2018    PROTIME 10.9 09/24/2018     No results for input(s): INR, PROTIME, APTT in the last 72 hours. Cardiac Enzymes:  Lab Results   Component Value Date    RTRABCX 216 (H) 09/27/2018     Recent Labs      09/26/18   0614  09/27/18   0603   CKTOTAL  533*  635*     BNP:   No results found for: BNP  No results for input(s): BNP in the last 72 hours. Input/Output:    Intake/Output Summary (Last 24 hours) at 09/27/18 0951  Last data filed at 09/27/18 8360   Gross per 24 hour   Intake              200 ml   Output             1675 ml   Net            -1475 ml     Radiology:    Chest X-ray:  Impression   Cardiomegaly.  No x-ray evidence of CHF.  There appears to be \"water bottle\"   shape of the heart.  Need echocardiography to evaluate possible pericardial   fluid should be determined clinically. Echocardiogram:    Summary  Technically difficult study due to patients body habitus. Moderate-severe left ventricular hypertrophy. Global left ventricular systolic function is hyperdynamic. Estimated  ejection fraction is > 65 % . Significant increased velocities are seen in the LVOT region extending into  the distal IVS; Peak gradient obtained was 109 mmHg in the mid ventricle  level. This may be secondary to the significant LVH and hypercontracility of  the left ventricle. Aortic valve structure and function normal.  No significant mitral regurgitation or stenosis is seen. Mild tricuspid regurgitation.     ASSESSMENT AND PLAN     Patient Active Problem List   Diagnosis    Multiple and open wound of lower limb, complicated, unspecified laterality, initial encounter    Blood loss anemia    Hyperkalemia    SAM (acute kidney injury) ([address]    Rectal bleeding    Bilateral leg pain    Acute renal failure (HCC)    Metabolic acidosis    Hypocalcemia    Hypoalbuminemia    Heart attack ([address]    Infected skin ulcer limited to breakdown of skin (HCC)    Hyponatremia    Non-traumatic rhabdomyolysis    Iron

## 2018-09-26 NOTE — PROGRESS NOTES
Renal Progress Note    Patient :  Manuel Sterling; 77 y.o. MRN# [id number]  Location:  9536/5594-23  Attending:  Josee Hollingsworth DO  Admit Date:  9/23/2018   Hospital Day: 3      Subjective:     Patient is seen and examined on hospital day #4. Both of her lower extremities are bandaged below the knee. She does have seepage through both dressings. debridement of leg movements done yesterday. Pictures reviewed. Seem to have areas of necrotic tissue on both legs. Urine output suboptimal but better. Creatinine is stable. Hyperkalemia resolved. On vancomycin and Zosyn  Hemodynamically stable. She is not requiring renal replacement therapy. She does have a Keyes catheter in place. Her mobility is limited. She does get some dizziness and lightheadedness from time to time that is relatively distressing to her. Outpatient Medications:     Prescriptions Prior to Admission: Ascorbic Acid (VITAMIN C) 500 MG tablet, Take 1 tablet by mouth daily  gabapentin (NEURONTIN) 300 MG capsule, Take 1 capsule by mouth 3 times daily Take 300mg po nightly for 3 days, then 300mg po bid for 3 days, then 300mg po tid.   carvedilol (COREG) 12.5 MG tablet, Take 1 tablet by mouth 2 times daily (with meals)  NIFEdipine (NIFEDICAL XL) 60 MG CR tablet, Take 1 tablet by mouth daily  spironolactone (ALDACTONE) 25 MG tablet, Take 1 tablet by mouth daily  ranitidine (ZANTAC) 150 MG tablet, Take 1 tablet by mouth 2 times daily    Current Medications:     Scheduled Meds:    zinc sulfate  220 mg Oral Daily    therapeutic multivitamin-minerals  1 tablet Oral Daily    nicotine  1 patch Transdermal Daily    [START ON 9/27/2018] furosemide  80 mg Intravenous Daily    linezolid  600 mg Oral 2 times per day    metoprolol tartrate  25 mg Oral BID    polyethylene glycol  17 g Oral Daily    piperacillin-tazobactam  3.375 g Intravenous Q12H    darbepoetin reva-polysorbate  60 mcg Subcutaneous Weekly    vitamin C  500 mg Oral Daily    pantoprazole

## 2018-09-27 PROBLEM — K59.01 SLOW TRANSIT CONSTIPATION: Status: ACTIVE | Noted: 2018-09-27

## 2018-09-27 LAB
ABSOLUTE EOS #: 0.52 K/UL (ref 0–0.44)
ABSOLUTE IMMATURE GRANULOCYTE: 0.17 K/UL (ref 0–0.3)
ABSOLUTE LYMPH #: 2.08 K/UL (ref 1.1–3.7)
ABSOLUTE MONO #: 1.21 K/UL (ref 0.1–1.2)
ANION GAP SERPL CALCULATED.3IONS-SCNC: 10 MMOL/L (ref 9–17)
BASOPHILS # BLD: 1 % (ref 0–2)
BASOPHILS ABSOLUTE: 0.17 K/UL (ref 0–0.2)
BUN BLDV-MCNC: 33 MG/DL (ref 8–23)
BUN/CREAT BLD: ABNORMAL (ref 9–20)
CALCIUM IONIZED: 1.1 MMOL/L (ref 1.13–1.33)
CALCIUM SERPL-MCNC: 8.2 MG/DL (ref 8.6–10.4)
CHLORIDE BLD-SCNC: 97 MMOL/L (ref 98–107)
CO2: 29 MMOL/L (ref 20–31)
CREAT SERPL-MCNC: 2.16 MG/DL (ref 0.5–0.9)
DIFFERENTIAL TYPE: ABNORMAL
EOSINOPHILS RELATIVE PERCENT: 3 % (ref 1–4)
GFR AFRICAN AMERICAN: 27 ML/MIN
GFR NON-AFRICAN AMERICAN: 23 ML/MIN
GFR SERPL CREATININE-BSD FRML MDRD: ABNORMAL ML/MIN/{1.73_M2}
GFR SERPL CREATININE-BSD FRML MDRD: ABNORMAL ML/MIN/{1.73_M2}
GLUCOSE BLD-MCNC: 104 MG/DL (ref 65–105)
GLUCOSE BLD-MCNC: 94 MG/DL (ref 65–105)
GLUCOSE BLD-MCNC: 94 MG/DL (ref 70–99)
HCT VFR BLD CALC: 27.2 % (ref 36.3–47.1)
HEMOGLOBIN: 7.8 G/DL (ref 11.9–15.1)
IMMATURE GRANULOCYTES: 1 %
LYMPHOCYTES # BLD: 12 % (ref 24–43)
MCH RBC QN AUTO: 24.5 PG (ref 25.2–33.5)
MCHC RBC AUTO-ENTMCNC: 28.7 G/DL (ref 28.4–34.8)
MCV RBC AUTO: 85.3 FL (ref 82.6–102.9)
MONOCYTES # BLD: 7 % (ref 3–12)
MORPHOLOGY: ABNORMAL
NRBC AUTOMATED: 0.4 PER 100 WBC
PDW BLD-RTO: 23.2 % (ref 11.8–14.4)
PLATELET # BLD: 337 K/UL (ref 138–453)
PLATELET ESTIMATE: ABNORMAL
PMV BLD AUTO: 9.1 FL (ref 8.1–13.5)
POTASSIUM SERPL-SCNC: 4.1 MMOL/L (ref 3.7–5.3)
RBC # BLD: 3.19 M/UL (ref 3.95–5.11)
RBC # BLD: ABNORMAL 10*6/UL
SEG NEUTROPHILS: 76 % (ref 36–65)
SEGMENTED NEUTROPHILS ABSOLUTE COUNT: 13.15 K/UL (ref 1.5–8.1)
SEND OUT REPORT: NORMAL
SODIUM BLD-SCNC: 136 MMOL/L (ref 135–144)
TEST NAME: NORMAL
TOTAL CK: 635 U/L (ref 26–192)
WBC # BLD: 17.3 K/UL (ref 3.5–11.3)
WBC # BLD: ABNORMAL 10*3/UL

## 2018-09-27 PROCEDURE — 82330 ASSAY OF CALCIUM: CPT

## 2018-09-27 PROCEDURE — 97110 THERAPEUTIC EXERCISES: CPT

## 2018-09-27 PROCEDURE — 82550 ASSAY OF CK (CPK): CPT

## 2018-09-27 PROCEDURE — 2580000003 HC RX 258: Performed by: INTERNAL MEDICINE

## 2018-09-27 PROCEDURE — 80048 BASIC METABOLIC PNL TOTAL CA: CPT

## 2018-09-27 PROCEDURE — 6370000000 HC RX 637 (ALT 250 FOR IP): Performed by: INTERNAL MEDICINE

## 2018-09-27 PROCEDURE — 99232 SBSQ HOSP IP/OBS MODERATE 35: CPT | Performed by: INTERNAL MEDICINE

## 2018-09-27 PROCEDURE — 6370000000 HC RX 637 (ALT 250 FOR IP): Performed by: EMERGENCY MEDICINE

## 2018-09-27 PROCEDURE — 2580000003 HC RX 258: Performed by: NURSE PRACTITIONER

## 2018-09-27 PROCEDURE — 6360000002 HC RX W HCPCS

## 2018-09-27 PROCEDURE — 85025 COMPLETE CBC W/AUTO DIFF WBC: CPT

## 2018-09-27 PROCEDURE — 6360000002 HC RX W HCPCS: Performed by: STUDENT IN AN ORGANIZED HEALTH CARE EDUCATION/TRAINING PROGRAM

## 2018-09-27 PROCEDURE — 6370000000 HC RX 637 (ALT 250 FOR IP): Performed by: SURGERY

## 2018-09-27 PROCEDURE — C9113 INJ PANTOPRAZOLE SODIUM, VIA: HCPCS | Performed by: NURSE PRACTITIONER

## 2018-09-27 PROCEDURE — 6360000002 HC RX W HCPCS: Performed by: NURSE PRACTITIONER

## 2018-09-27 PROCEDURE — 6370000000 HC RX 637 (ALT 250 FOR IP): Performed by: STUDENT IN AN ORGANIZED HEALTH CARE EDUCATION/TRAINING PROGRAM

## 2018-09-27 PROCEDURE — 2580000003 HC RX 258: Performed by: STUDENT IN AN ORGANIZED HEALTH CARE EDUCATION/TRAINING PROGRAM

## 2018-09-27 PROCEDURE — 6370000000 HC RX 637 (ALT 250 FOR IP): Performed by: NURSE PRACTITIONER

## 2018-09-27 PROCEDURE — 36415 COLL VENOUS BLD VENIPUNCTURE: CPT

## 2018-09-27 PROCEDURE — 6370000000 HC RX 637 (ALT 250 FOR IP): Performed by: HOSPITALIST

## 2018-09-27 PROCEDURE — 2060000000 HC ICU INTERMEDIATE R&B

## 2018-09-27 PROCEDURE — 76937 US GUIDE VASCULAR ACCESS: CPT

## 2018-09-27 PROCEDURE — 82947 ASSAY GLUCOSE BLOOD QUANT: CPT

## 2018-09-27 PROCEDURE — 6360000002 HC RX W HCPCS: Performed by: INTERNAL MEDICINE

## 2018-09-27 RX ORDER — POLYETHYLENE GLYCOL 3350 17 G/17G
34 POWDER, FOR SOLUTION ORAL DAILY
Status: DISCONTINUED | OUTPATIENT
Start: 2018-09-28 | End: 2018-10-05 | Stop reason: HOSPADM

## 2018-09-27 RX ORDER — FUROSEMIDE 10 MG/ML
INJECTION INTRAMUSCULAR; INTRAVENOUS
Status: COMPLETED
Start: 2018-09-27 | End: 2018-09-27

## 2018-09-27 RX ADMIN — BISACODYL 10 MG: 10 SUPPOSITORY RECTAL at 14:08

## 2018-09-27 RX ADMIN — PANTOPRAZOLE SODIUM 40 MG: 40 INJECTION, POWDER, FOR SOLUTION INTRAVENOUS at 10:45

## 2018-09-27 RX ADMIN — Medication 10 ML: at 10:46

## 2018-09-27 RX ADMIN — Medication 500 MG: at 10:23

## 2018-09-27 RX ADMIN — METOPROLOL TARTRATE 25 MG: 25 TABLET ORAL at 10:23

## 2018-09-27 RX ADMIN — CALCIUM GLUCONATE 1 G: 98 INJECTION, SOLUTION INTRAVENOUS at 16:42

## 2018-09-27 RX ADMIN — FUROSEMIDE 80 MG: 10 INJECTION, SOLUTION INTRAMUSCULAR; INTRAVENOUS at 11:20

## 2018-09-27 RX ADMIN — Medication 220 MG: at 10:23

## 2018-09-27 RX ADMIN — IRON SUCROSE 200 MG: 20 INJECTION, SOLUTION INTRAVENOUS at 23:44

## 2018-09-27 RX ADMIN — LINEZOLID 600 MG: 600 TABLET, FILM COATED ORAL at 10:22

## 2018-09-27 RX ADMIN — LINEZOLID 600 MG: 600 TABLET, FILM COATED ORAL at 22:02

## 2018-09-27 RX ADMIN — METOPROLOL TARTRATE 25 MG: 25 TABLET ORAL at 22:02

## 2018-09-27 RX ADMIN — PANTOPRAZOLE SODIUM 40 MG: 40 INJECTION, POWDER, FOR SOLUTION INTRAVENOUS at 22:02

## 2018-09-27 RX ADMIN — OXYCODONE HYDROCHLORIDE 10 MG: 5 TABLET ORAL at 09:13

## 2018-09-27 RX ADMIN — PIPERACILLIN AND TAZOBACTAM 3.38 G: 3; .375 INJECTION, POWDER, LYOPHILIZED, FOR SOLUTION INTRAVENOUS; PARENTERAL at 16:42

## 2018-09-27 RX ADMIN — PIPERACILLIN AND TAZOBACTAM 3.38 G: 3; .375 INJECTION, POWDER, LYOPHILIZED, FOR SOLUTION INTRAVENOUS; PARENTERAL at 05:38

## 2018-09-27 RX ADMIN — OXYCODONE HYDROCHLORIDE 15 MG: 5 TABLET ORAL at 03:15

## 2018-09-27 RX ADMIN — OXYCODONE HYDROCHLORIDE 15 MG: 5 TABLET ORAL at 22:55

## 2018-09-27 RX ADMIN — POLYETHYLENE GLYCOL 3350 17 G: 17 POWDER, FOR SOLUTION ORAL at 10:22

## 2018-09-27 RX ADMIN — MULTIPLE VITAMINS W/ MINERALS TAB 1 TABLET: TAB at 10:20

## 2018-09-27 ASSESSMENT — ENCOUNTER SYMPTOMS
ORTHOPNEA: 0
NAUSEA: 0
VOMITING: 0
ABDOMINAL PAIN: 0
SHORTNESS OF BREATH: 1
CONSTIPATION: 1
SORE THROAT: 0
SHORTNESS OF BREATH: 0
SPUTUM PRODUCTION: 0
BLURRED VISION: 0
PHOTOPHOBIA: 0
GASTROINTESTINAL NEGATIVE: 1
COUGH: 0
BACK PAIN: 0
RESPIRATORY NEGATIVE: 1

## 2018-09-27 ASSESSMENT — PAIN DESCRIPTION - PROGRESSION
CLINICAL_PROGRESSION: GRADUALLY WORSENING

## 2018-09-27 ASSESSMENT — PAIN DESCRIPTION - LOCATION
LOCATION: ABDOMEN

## 2018-09-27 ASSESSMENT — PAIN DESCRIPTION - ONSET
ONSET: AWAKENED FROM SLEEP
ONSET: ON-GOING
ONSET: ON-GOING

## 2018-09-27 ASSESSMENT — PAIN DESCRIPTION - ORIENTATION
ORIENTATION: RIGHT;LEFT;LOWER

## 2018-09-27 ASSESSMENT — PAIN SCALES - GENERAL
PAINLEVEL_OUTOF10: 9
PAINLEVEL_OUTOF10: 10
PAINLEVEL_OUTOF10: 5
PAINLEVEL_OUTOF10: 10
PAINLEVEL_OUTOF10: 7
PAINLEVEL_OUTOF10: 10
PAINLEVEL_OUTOF10: 4

## 2018-09-27 ASSESSMENT — PAIN DESCRIPTION - PAIN TYPE
TYPE: CHRONIC PAIN

## 2018-09-27 ASSESSMENT — PAIN DESCRIPTION - DESCRIPTORS
DESCRIPTORS: DISCOMFORT;PRESSURE;CONSTANT
DESCRIPTORS: PRESSURE;DISCOMFORT
DESCRIPTORS: DISCOMFORT;PRESSURE

## 2018-09-27 ASSESSMENT — PAIN DESCRIPTION - FREQUENCY
FREQUENCY: CONTINUOUS

## 2018-09-27 NOTE — PROGRESS NOTES
endurance;Decreased strength  Assessment: Pt performing seated LE exercises in chair but limited d/t onset of nausea and increased anxiety after taking medication therefore PT discontinued  Prognosis: Good  Patient Education: DVT prevention  REQUIRES PT FOLLOW UP: Yes  Activity Tolerance  Activity Tolerance: Patient limited by pain; Other  Activity Tolerance: dizziness, nausea and increased anxiety             Goals  Short term goals  Time Frame for Short term goals: 14 visits  Short term goal 1: Pt will be CGA with bed mobility  Short term goal 2: Pt will be CGA transfers  Short term goal 3: Pt will be Pt will amb 20' RW Emma or least restrictive AD  Short term goal 4: Pt will be safe to attempt steps    Plan    Plan  Times per week: 5-6x/wk  Current Treatment Recommendations: Strengthening, Balance Training, Endurance Training, Functional Mobility Training, Gait Training, Transfer Training, Patient/Caregiver Education & Training, Equipment Evaluation, Education, & procurement, Home Exercise Program  Safety Devices  Type of devices: Call light within reach, Patient at risk for falls, Gait belt, Nurse notified, Left in chair  Restraints  Initially in place: No     Therapy Time   Individual Concurrent Group Co-treatment   Time In 1018         Time Out 1033         Minutes 15                 Patricia Dubois, PTA

## 2018-09-27 NOTE — PROGRESS NOTES
Renal Progress Note    Patient :  Bowen Moulton; 77 y.o. MRN# [id number]  Location:  1961/9452-90  Attending:  Lucinda Elliott DO  Admit Date:  9/23/2018   Hospital Day: 4      Subjective:     Patient is seen and examined on hospital day #5. Both of her lower extremities are bandaged below the knee. She does have seepage through both dressings. debridement of leg movements done yesterday. Pictures reviewed. Seem to have areas of necrotic tissue on both legs. Urine output suboptimal but better. Creatinine is stable. Hyperkalemia resolved. On Zosyn  Hemodynamically stable. She is not requiring renal replacement therapy. Keyes is out. She does get some dizziness and lightheadedness from time to time that is relatively distressing to her. Outpatient Medications:     Prescriptions Prior to Admission: Ascorbic Acid (VITAMIN C) 500 MG tablet, Take 1 tablet by mouth daily  gabapentin (NEURONTIN) 300 MG capsule, Take 1 capsule by mouth 3 times daily Take 300mg po nightly for 3 days, then 300mg po bid for 3 days, then 300mg po tid.   carvedilol (COREG) 12.5 MG tablet, Take 1 tablet by mouth 2 times daily (with meals)  NIFEdipine (NIFEDICAL XL) 60 MG CR tablet, Take 1 tablet by mouth daily  spironolactone (ALDACTONE) 25 MG tablet, Take 1 tablet by mouth daily  ranitidine (ZANTAC) 150 MG tablet, Take 1 tablet by mouth 2 times daily    Current Medications:     Scheduled Meds:    calcium gluconate IVPB  1 g Intravenous Once    zinc sulfate  220 mg Oral Daily    therapeutic multivitamin-minerals  1 tablet Oral Daily    nicotine  1 patch Transdermal Daily    furosemide  80 mg Intravenous Daily    linezolid  600 mg Oral 2 times per day    metoprolol tartrate  25 mg Oral BID    polyethylene glycol  17 g Oral Daily    piperacillin-tazobactam  3.375 g Intravenous Q12H    darbepoetin reva-polysorbate  60 mcg Subcutaneous Weekly    vitamin C  500 mg Oral Daily    pantoprazole  40 mg Intravenous BID    And    sodium chloride (PF)  10 mL Intravenous Daily    sodium hypochlorite   Irrigation Daily    calcium gluconate  1 g Intravenous Once    iron sucrose  200 mg Intravenous Q24H     Continuous Infusions:    dextrose      dextrose       PRN Meds:  oxyCODONE **OR** oxyCODONE, bisacodyl, polyethylene glycol, bisacodyl, nitroGLYCERIN, ondansetron, acetaminophen, dextrose, dextrose, dextrose, glucose, dextrose, glucagon (rDNA), dextrose    Input/Output:       I/O last 3 completed shifts: In: 200 [P.O.:200]  Out: 1500 [Urine:1500]. Patient Vitals for the past 96 hrs (Last 3 readings):   Weight   18 0649 (!) 312 lb 2.7 oz (141.6 kg)       Vital Signs:   Temperature:  Temp: 98.1 °F (36.7 °C)  TMax:   Temp (24hrs), Av.2 °F (36.8 °C), Min:98.1 °F (36.7 °C), Max:98.5 °F (36.9 °C)    Respirations:  Resp: 20  Pulse:   Pulse: 75  BP:    BP: (!) 115/36  BP Range: Systolic (20DHX), TONY:199 , Min:113 , YSY:501       Diastolic (20XHD), WGQ:06, Min:36, Max:46      Physical Examination:     General:  Awake follows commands, speaking in full sentences, no accessory muscle use. HEENT: Atraumatic, normocephalic, no throat congestion, moist mucosa. Eyes:   Pupils equal, EOMI. Neck:   No JVD, midline trachea, no accessory muscle use. Chest:   Bilateral vesicular breath sounds, no rales or wheezes, some decreased breath sounds at the bases. Cardiac:  S1 S2 RR, systolic ejection murmur at the apex, gallops or rubs, JVP not raised. Abdomen: Soft, obese, non-tender, no masses, BS audible. :   No suprapubic or flank tenderness. Neuro:  Awake follows commands, No FND. SKIN:  No rashes, good skin turgor. Extremities:  Significant 2+ lower extremity edema with wraps in place below the knees and seepage posteriorly from the wounds.    status post debridement  Labs:       Recent Labs      18   0411   18   0614  18   1201  18   0603   WBC  17.8*   --   17.4*   --   17.3*   RBC  3.12*   --   3.12*   --   3.19* HGB  7.8*   < >  7.7*  7.4*  7.8*   HCT  25.3*   < >  26.0*  24.2*  27.2*   MCV  81.1*   --   83.3   --   85.3   MCH  25.0*   --   24.7*   --   24.5*   MCHC  30.8   --   29.6   --   28.7   RDW  21.5*   --   22.7*   --   23.2*   PLT  292   --   317   --   337   MPV  8.9   --   9.0   --   9.1    < > = values in this interval not displayed. BMP:   Recent Labs      09/26/18   1201  09/26/18   1642  09/27/18   0603   NA  136  135  136   K  3.7  3.8  4.1   CL  96*  96*  97*   CO2  28  26  29   BUN  34*  33*  33*   CREATININE  2.44*  2.35*  2.16*   GLUCOSE  99  108*  94   CALCIUM  7.7*  7.9*  8.2*      Phosphorus:     No results for input(s): PHOS in the last 72 hours. Magnesium:    Recent Labs      09/26/18   0614   MG  2.2     Albumin:    No results for input(s): LABALBU in the last 72 hours. BNP:    No results found for: BNP  SALMA:      Lab Results   Component Value Date    SALMA NEGATIVE 09/23/2018     SPEP:  Lab Results   Component Value Date    PROT 6.6 09/24/2018    PATH ELECTRONICALLY SIGNED.  Carlos Enrique Newman M.D. 09/23/2018     UPEP:   No results found for: LABPE  C3:     Lab Results   Component Value Date    C3 124 09/23/2018     C4:     Lab Results   Component Value Date    C4 19 09/23/2018     MPO ANCA:     Lab Results   Component Value Date    MPO 7 09/23/2018     PR3 ANCA:     Lab Results   Component Value Date    PR3 8 09/23/2018     Anti-GBM:   No results found for: GBMABIGG  Hep BsAg:         Lab Results   Component Value Date    HEPBSAG NONREACTIVE 09/23/2018     Hep C AB:          Lab Results   Component Value Date    HEPCAB NONREACTIVE 09/23/2018       Urinalysis/Chemistries:      Lab Results   Component Value Date    NITRU NEGATIVE 09/23/2018    COLORU YELLOW 09/23/2018    PHUR 5.0 09/23/2018    WBCUA 2 TO 5 09/23/2018    RBCUA 0 TO 2 09/23/2018    MUCUS 1+ 09/23/2018    TRICHOMONAS NOT REPORTED 09/23/2018    YEAST NOT REPORTED 09/23/2018    BACTERIA FEW 09/23/2018    SPECGRAV 1.022 09/23/2018

## 2018-09-27 NOTE — PROGRESS NOTES
Medical Nutrition Therapy    Calorie Count: No menus saved. Unable to calculate intake.     Wang Tang, RD, LD

## 2018-09-27 NOTE — PROGRESS NOTES
[facility]    Progress Note    9/27/2018    2:05 PM    Name:   Colonel Lane  MRN:     [id number]     Mando Fonder:      [de-identified]   Room:   [address] [place] [address] Day:  4  Admit Date:  9/23/2018  1:29 AM    PCP:   No primary care provider on file. Code Status:  Full Code    Subjective:     C/C:   Chief Complaint   Patient presents with    Wound Check     legs bilat    Leg Pain     bilat     Interval History Status:      Up to chair  Constipated/still no bowel movement  Taking MiraLAX  Has not used suppository  Pain controlled  No lower GI bleeding  Renal function improving    Database updates:  Calcium, Ion 1.10 (L)  BUN 33 (H) mg/dL   Total  (H)  WBC 17.3 (H)  CREATININE 2.16 (H)      Brief History:     As documented in the medical record: \"The patient is a 77 y.o. Non-/non  female who came into the emergency department because of bilateral lower extremity wounds. The patient says that she has been having these lower extremity wound for the last 6 months. She says she does not see any physician. An only medication that she takes at home is Aleve. She says for the last month or 2 she started having bleeding from the lower extremity wounds. She has been trying to be due home remedies and had been applying peroxide on them. Otherwise she did not see any physicians. When she came aboard to the emergency department, she was found to have profuse bleeding from the lower extremity wounds. Her hemoglobin was 5.4, potassium of 6.8, creatinine of 3.3 and a lactic acid of 3.1. She also had WBC of 25,000 . She was ordered 2 units of blood and started on Vanco and Zosyn. She was also given insulin, dextrose, calcium gluconate. She apparently had tall T waves on her EKG. Patient currently denies any dizziness, she does have some epigastric pain.   On asking about black stools, patient is not sure why says that she might have had black color FIO2    Radiology:      Assessment:        Primary Problem  Principal Problem:    Multiple and open wound of lower limb, complicated, unspecified laterality, initial encounter  Active Problems:    Blood loss anemia    Hyperkalemia    SAM (acute kidney injury) ([address]    Rectal bleeding    Bilateral leg pain    Acute renal failure (HCC)    Metabolic acidosis    Hypocalcemia    Hypoalbuminemia    Heart attack ([address]    Infected skin ulcer with fat layer exposed ([address]    Hyponatremia    Non-traumatic rhabdomyolysis    Iron deficiency anemia    Bandemia    Slow transit constipation  Resolved Problems:    * No resolved hospital problems.  *      Plan:        Surgical evaluation  / Wound care  Antibiotics per C&S / Infectious Disease - Zyvox, Zosyn  Correct electrolyte abnormalities  Check bun and creatinine, Remove Keyes when okay with renal / convert to external cath  Monitor CPK - rhabdomyolysis  Will monitor and control Blood Pressure  Cardiology evaluation - nonspecific troponin elevation  Transfuse as needed and observe for GI bleed, Aranesp as ordered    Iron supplementation as needed (ferritin 32)  GI evaluation - EGD and colonoscopy to be arranged, check occult blood  Laxatives as needed for constipation - enema if needed  Anticipate placement  Discharge planning initiated  PT [facility]  Social service consult - home antibiotics / wound care    IP CONSULT TO HOSPITALIST  IP CONSULT TO INFECTIOUS DISEASES  PHARMACY TO DOSE VANCOMYCIN  IP CONSULT  [address] CONSULT TO NEPHROLOGY  IP CONSULT TO GI  IP CONSULT TO [address] [place] CONSULT TO IV TEAM  IP CONSULT TO CARDIOLOGY  IP CONSULT TO DO Farrah  9/27/2018  2:05 PM

## 2018-09-27 NOTE — PROGRESS NOTES
AST, ALT, ALB, BILITOT, ALKPHOS in the last 72 hours. Troponin: No results for input(s): TROPONINI in the last 72 hours. BNP: No results for input(s): BNP in the last 72 hours. Lipids: No results for input(s): CHOL, HDL in the last 72 hours. Invalid input(s): LDLCALCU  INR:   No results for input(s): INR in the last 72 hours. EKG:   Sinus rhythm with LVH and anterolateral T wave inversion and lateral ST depressions     ECHO:   09/24/2018  Technically difficult study due to patients body habitus. Moderate-severe left ventricular hypertrophy. Global left ventricular systolic function is hyperdynamic. Estimated  ejection fraction is > 65 % . Significant increased velocities are seen in the LVOT region extending into  the distal IVS; Peak gradient obtained was 109 mmHg in the mid ventricle  level. This may be secondary to the significant LVH and hypercontracility of  the left ventricle. Aortic valve structure and function normal.  No significant mitral regurgitation or stenosis is seen. Mild tricuspid regurgitation. Objective:   Vitals: BP (!) 127/42   Pulse 88   Temp 98.5 °F (36.9 °C) (Oral)   Resp 14   Ht 5' 3.5\" (1.613 m)   Wt (!) 312 lb 2.7 oz (141.6 kg)   LMP 09/18/2018 (Within Days)   SpO2 100%   BMI 54.43 kg/m²   General appearance: alert and cooperative with exam  HEENT: Head: Normocephalic, no lesions, without obvious abnormality. Neck: no JVD, trachea midline, no adenopathy  Lungs: Clear to auscultation  Heart: Regular rate and rhythm, s1/s2 auscultated, no murmurs, SR  Abdomen: soft, non-tender, bowel sounds active  Extremities: +2  Edema, ACE wraps intact - surgical wounds not assessed  Neurologic: not done    Echo 9/24/18  Summary  Technically difficult study due to patients body habitus. Moderate-severe left ventricular hypertrophy. Global left ventricular systolic function is hyperdynamic. Estimated  ejection fraction is > 65 % .   Significant increased velocities are seen in the

## 2018-09-28 LAB
ABSOLUTE EOS #: 0.49 K/UL (ref 0–0.4)
ABSOLUTE IMMATURE GRANULOCYTE: 0.16 K/UL (ref 0–0.3)
ABSOLUTE LYMPH #: 2.11 K/UL (ref 1–4.8)
ABSOLUTE MONO #: 0.97 K/UL (ref 0.1–0.8)
ANION GAP SERPL CALCULATED.3IONS-SCNC: 14 MMOL/L (ref 9–17)
BASOPHILS # BLD: 0 % (ref 0–2)
BASOPHILS ABSOLUTE: 0 K/UL (ref 0–0.2)
BUN BLDV-MCNC: 29 MG/DL (ref 8–23)
BUN/CREAT BLD: ABNORMAL (ref 9–20)
CALCIUM IONIZED: 1.13 MMOL/L (ref 1.13–1.33)
CALCIUM SERPL-MCNC: 8.3 MG/DL (ref 8.6–10.4)
CHLORIDE BLD-SCNC: 98 MMOL/L (ref 98–107)
CO2: 27 MMOL/L (ref 20–31)
CREAT SERPL-MCNC: 1.95 MG/DL (ref 0.5–0.9)
DIFFERENTIAL TYPE: ABNORMAL
EOSINOPHILS RELATIVE PERCENT: 3 % (ref 1–4)
GFR AFRICAN AMERICAN: 31 ML/MIN
GFR NON-AFRICAN AMERICAN: 25 ML/MIN
GFR SERPL CREATININE-BSD FRML MDRD: ABNORMAL ML/MIN/{1.73_M2}
GFR SERPL CREATININE-BSD FRML MDRD: ABNORMAL ML/MIN/{1.73_M2}
GLUCOSE BLD-MCNC: 107 MG/DL (ref 70–99)
GLUCOSE BLD-MCNC: 121 MG/DL (ref 65–105)
GLUCOSE BLD-MCNC: 92 MG/DL (ref 65–105)
GLUCOSE BLD-MCNC: 97 MG/DL (ref 65–105)
HCT VFR BLD CALC: 28.4 % (ref 36.3–47.1)
HEMOGLOBIN: 8.1 G/DL (ref 11.9–15.1)
IMMATURE GRANULOCYTES: 1 %
LYMPHOCYTES # BLD: 13 % (ref 24–44)
MCH RBC QN AUTO: 24.5 PG (ref 25.2–33.5)
MCHC RBC AUTO-ENTMCNC: 28.5 G/DL (ref 28.4–34.8)
MCV RBC AUTO: 85.8 FL (ref 82.6–102.9)
MONOCYTES # BLD: 6 % (ref 1–7)
MORPHOLOGY: ABNORMAL
NRBC AUTOMATED: 0.2 PER 100 WBC
PDW BLD-RTO: 24.3 % (ref 11.8–14.4)
PLATELET # BLD: 350 K/UL (ref 138–453)
PLATELET ESTIMATE: ABNORMAL
PMV BLD AUTO: 9.1 FL (ref 8.1–13.5)
POTASSIUM SERPL-SCNC: 3.7 MMOL/L (ref 3.7–5.3)
RBC # BLD: 3.31 M/UL (ref 3.95–5.11)
RBC # BLD: ABNORMAL 10*6/UL
SEG NEUTROPHILS: 77 % (ref 36–66)
SEGMENTED NEUTROPHILS ABSOLUTE COUNT: 12.47 K/UL (ref 1.8–7.7)
SODIUM BLD-SCNC: 139 MMOL/L (ref 135–144)
TOTAL CK: 276 U/L (ref 26–192)
WBC # BLD: 16.2 K/UL (ref 3.5–11.3)
WBC # BLD: ABNORMAL 10*3/UL

## 2018-09-28 PROCEDURE — 6360000002 HC RX W HCPCS: Performed by: INTERNAL MEDICINE

## 2018-09-28 PROCEDURE — 2580000003 HC RX 258: Performed by: NURSE PRACTITIONER

## 2018-09-28 PROCEDURE — 6370000000 HC RX 637 (ALT 250 FOR IP): Performed by: EMERGENCY MEDICINE

## 2018-09-28 PROCEDURE — C9113 INJ PANTOPRAZOLE SODIUM, VIA: HCPCS | Performed by: NURSE PRACTITIONER

## 2018-09-28 PROCEDURE — 99232 SBSQ HOSP IP/OBS MODERATE 35: CPT | Performed by: INTERNAL MEDICINE

## 2018-09-28 PROCEDURE — 80048 BASIC METABOLIC PNL TOTAL CA: CPT

## 2018-09-28 PROCEDURE — 6360000002 HC RX W HCPCS: Performed by: NURSE PRACTITIONER

## 2018-09-28 PROCEDURE — 82947 ASSAY GLUCOSE BLOOD QUANT: CPT

## 2018-09-28 PROCEDURE — 2060000000 HC ICU INTERMEDIATE R&B

## 2018-09-28 PROCEDURE — 6370000000 HC RX 637 (ALT 250 FOR IP): Performed by: INTERNAL MEDICINE

## 2018-09-28 PROCEDURE — 85025 COMPLETE CBC W/AUTO DIFF WBC: CPT

## 2018-09-28 PROCEDURE — 6370000000 HC RX 637 (ALT 250 FOR IP): Performed by: STUDENT IN AN ORGANIZED HEALTH CARE EDUCATION/TRAINING PROGRAM

## 2018-09-28 PROCEDURE — 6360000002 HC RX W HCPCS: Performed by: STUDENT IN AN ORGANIZED HEALTH CARE EDUCATION/TRAINING PROGRAM

## 2018-09-28 PROCEDURE — 97530 THERAPEUTIC ACTIVITIES: CPT

## 2018-09-28 PROCEDURE — 82550 ASSAY OF CK (CPK): CPT

## 2018-09-28 PROCEDURE — 36415 COLL VENOUS BLD VENIPUNCTURE: CPT

## 2018-09-28 PROCEDURE — 97116 GAIT TRAINING THERAPY: CPT

## 2018-09-28 PROCEDURE — 6370000000 HC RX 637 (ALT 250 FOR IP): Performed by: SURGERY

## 2018-09-28 PROCEDURE — 82330 ASSAY OF CALCIUM: CPT

## 2018-09-28 PROCEDURE — 6370000000 HC RX 637 (ALT 250 FOR IP): Performed by: NURSE PRACTITIONER

## 2018-09-28 RX ORDER — MORPHINE SULFATE 4 MG/ML
2 INJECTION, SOLUTION INTRAMUSCULAR; INTRAVENOUS ONCE
Status: COMPLETED | OUTPATIENT
Start: 2018-09-28 | End: 2018-09-28

## 2018-09-28 RX ORDER — FUROSEMIDE 40 MG/1
80 TABLET ORAL DAILY
Status: DISCONTINUED | OUTPATIENT
Start: 2018-09-29 | End: 2018-09-30

## 2018-09-28 RX ORDER — FENTANYL CITRATE 50 UG/ML
50 INJECTION, SOLUTION INTRAMUSCULAR; INTRAVENOUS ONCE
Status: COMPLETED | OUTPATIENT
Start: 2018-09-28 | End: 2018-09-28

## 2018-09-28 RX ADMIN — Medication 220 MG: at 09:25

## 2018-09-28 RX ADMIN — Medication 500 MG: at 09:25

## 2018-09-28 RX ADMIN — OXYCODONE HYDROCHLORIDE 10 MG: 5 TABLET ORAL at 03:23

## 2018-09-28 RX ADMIN — MORPHINE SULFATE 2 MG: 4 INJECTION INTRAVENOUS at 05:38

## 2018-09-28 RX ADMIN — Medication 10 ML: at 09:27

## 2018-09-28 RX ADMIN — MULTIPLE VITAMINS W/ MINERALS TAB 1 TABLET: TAB at 09:25

## 2018-09-28 RX ADMIN — PANTOPRAZOLE SODIUM 40 MG: 40 INJECTION, POWDER, FOR SOLUTION INTRAVENOUS at 09:27

## 2018-09-28 RX ADMIN — FUROSEMIDE 80 MG: 10 INJECTION, SOLUTION INTRAMUSCULAR; INTRAVENOUS at 09:27

## 2018-09-28 RX ADMIN — METOPROLOL TARTRATE 25 MG: 25 TABLET ORAL at 20:47

## 2018-09-28 RX ADMIN — PIPERACILLIN AND TAZOBACTAM 3.38 G: 3; .375 INJECTION, POWDER, LYOPHILIZED, FOR SOLUTION INTRAVENOUS; PARENTERAL at 06:09

## 2018-09-28 RX ADMIN — METOPROLOL TARTRATE 25 MG: 25 TABLET ORAL at 09:25

## 2018-09-28 RX ADMIN — OXYCODONE HYDROCHLORIDE 15 MG: 5 TABLET ORAL at 07:43

## 2018-09-28 RX ADMIN — FENTANYL CITRATE 50 MCG: 50 INJECTION INTRAMUSCULAR; INTRAVENOUS at 07:06

## 2018-09-28 RX ADMIN — LINEZOLID 600 MG: 600 TABLET, FILM COATED ORAL at 09:25

## 2018-09-28 RX ADMIN — POLYETHYLENE GLYCOL 3350 34 G: 17 POWDER, FOR SOLUTION ORAL at 09:42

## 2018-09-28 ASSESSMENT — PAIN SCALES - GENERAL
PAINLEVEL_OUTOF10: 9
PAINLEVEL_OUTOF10: 10
PAINLEVEL_OUTOF10: 10
PAINLEVEL_OUTOF10: 9

## 2018-09-28 ASSESSMENT — ENCOUNTER SYMPTOMS
RESPIRATORY NEGATIVE: 1
VOMITING: 0
SORE THROAT: 0
SPUTUM PRODUCTION: 0
BLURRED VISION: 0
PHOTOPHOBIA: 0
BACK PAIN: 0
COUGH: 0
SHORTNESS OF BREATH: 1
ABDOMINAL PAIN: 0
CONSTIPATION: 1
GASTROINTESTINAL NEGATIVE: 1
NAUSEA: 0
SHORTNESS OF BREATH: 0
ORTHOPNEA: 0

## 2018-09-28 NOTE — PROGRESS NOTES
[facility]  Progress Note    Malik Coker  MRN: [id number]  Date: 9/27/2018    Reason for F/U :   Multiple Infected wounds  Impression :   1. Multiple bilateral lower extremity wounds with evidence of necrosis and soft tissue infection. · Status post debridement 9/24/18  · Leukocytosis. -trending down. 2. Acute blood loss anemia   · status post 4 units of PRBC transfusion  3. Morbid obesity  4. Resolved hypotension  5. Acute kidney injury  6. Contaminated blood culture    Recommendations:   · Zosyn 3.375 Q 8 hours. Day 4.   · Linezolid 600 mg PO. Day two. · Will anticipate switching antibiotics per final wounds condition in am at dressing takedown. · Vancomycin d/c secondary to SAM. Total 2 doses. · Will re access need for grant soon. Patient does not ambulate well and patient undergoing aggressive diuresis. · Disc  w pt    Infection Control Recommendations:   Universal precautions    Discharge Planning:   Estimated Length of IV antimicrobials: To be determined  Patient will need Midline Catheter Insertion/ PICC line Insertion: No  Patient [facility]  [facility],  LTAC: Undetermined  Patient will need outpatient wound care: Yes    Medical Decision making / Summary of Stay:   The patient is a 77 y.o. female with significant past medical history of remote coronary artery disease and obesity. Patient has had bilateral lower extremity ulcerations for several months. The wounds gradually enlarged.  The patient's appetite became poor. She also had nausea but never vomited. She had chills but no fever. The family members urged her to come to the ER  In the emergency room she was found to have significant anemia, leukocytosis, acute renal failure and hyperkalemia. She was initially admitted to the floor and subsequently transferred to the intensive care unit after she had hypotension.     Current evaluation:9/27/2018  No fever and leaks some urine  Lower extremities post 10 mL Intravenous Daily    sodium hypochlorite   Irrigation Daily    calcium gluconate  1 g Intravenous Once    iron sucrose  200 mg Intravenous Q24H     Thank you for allowing us to participate in the care of this patient. Please call with questions. I have discussed the care of the patient, including pertinent history and exam findings,  with the resident. I have seen and examined the patient and the key elements of all parts of the encounter have been performed by me. I agree with the assessment, plan and orders as documented by the resident.     Polly Vázquez, Infectious Diseases

## 2018-09-28 NOTE — PROGRESS NOTES
acute distress. Obese   Lungs: Respirations easy and regular without use of accessory muscles. Lungs clear with decreased breath sounds at bilateral bases. Heart: S1S2 - regular rhythm  Abdomen: Soft, NT, ND +BS, no masses  Skin:  No jaundice, no stigmata of chronic liver disease. Neuro: Awake, alert, follows requests. PERRLA. No focal deficits. Lab and Imaging Review   Recent Labs      09/26/18   0614  09/26/18   1201  09/27/18   0603  09/28/18   0630   WBC  17.4*   --   17.3*  16.2*   HGB  7.7*  7.4*  7.8*  8.1*   MCV  83.3   --   85.3  85.8     No results for input(s): AST, ALT, ALB, BILIDIR, BILITOT, ALKPHOS in the last 72 hours. Recent Labs      09/26/18   1642  09/27/18   0603  09/28/18   0630   NA  135  136  139   K  3.8  4.1  3.7   CL  96*  97*  98   CO2  26  29  27   BUN  33*  33*  29*   CREATININE  2.35*  2.16*  1.95*   GLUCOSE  108*  94  107*   CALCIUM  7.9*  8.2*  8.3*     No results for input(s): PROT, INR in the last 72 hours. Assessment:   1. Multifactorial anemia secondary to anemia of chronic inflammation, acute blood loss from leg wounds, iron deficiency. History of chronic NSAID use. Never had EGD/colonoscopy in the past.  2. Constipation       Plan:   1. Patient would require EGD and Colonoscopy when more stable. 2. Continue Protonix  3. Monitor H/H. transfuse if < 7  4. Monitor stool output for signs of continuing GI bleeding. 5. Bowel regimen    This plan was formulated in collaboration with Dr. Remy Mendez.        Barb Gutierrez MD  PGY-1, Internal medicine resident  [place], [place]  10:55 AM  9/28/2018

## 2018-09-28 NOTE — PROGRESS NOTES
LABCREA 213.9 09/23/2018     Urine Eosinophils:  No components found for: UEOS    Radiology:     CXR:     Assessment:     1. Acute Kidney Injury: secondary to ischemic ATN from systemic inflammatory response syndrome, hypotension low-flow, Aggravated by NSAID use. Baseline creatinine not known with the serum creatinine on presentation was 3, appears to be improving some but relatively stable the last couple of days, nonoliguric  2. Chronic kidney disease suspected to to be stage III, ultrasound shows, asymmetrical kidneys with the right 11.5, left 9.2 cm. No baseline creatinine available  3.  mild volume overload  4. Atypical chest pain  5. Chronic lower extremity ulcers with necrotic changes s/p debridement 9/24   6. Hyperkalemia resolved  7. Anemia of chronic disease with low iron saturation  8. Volume overload, weight has gone up by at least 10 kg    Plan:   1. Continue Lasix 80 mg IV once a day  2. Monitor BP, HR with patient having some dizziness and lightheadedness at times  3. Follow BMP  4. Daily weights  5. Strict intake and output  Nutrition   Please ensure that patient is on a renal diet/TF. Avoid nephrotoxic drugs/contrast exposure. We will continue to follow along with you. Attending Physician Statement  I have discussed the care of Cecilia Ingram, including pertinent history and exam findings with the resident/fellow. I have reviewed the key elements of all parts of the encounter with the resident/fellow. I have seen and examined the patient with the resident/fellow. I agree with the assessment and plan and status of the problem list as documented. Addiitionally I recommend to change lasix to oral from 9/29/18.   Irish Patel

## 2018-09-28 NOTE — PROGRESS NOTES
normal. She exhibits no distension. There is no tenderness. There is no rebound and no guarding. Musculoskeletal: She exhibits edema and tenderness. Neurological: She is alert and oriented to person, place, and time. Skin: Skin is warm and dry. She is not diaphoretic. Both legs wrapped, dry, clean     Previous photos have revealed multiple wounds:                            Data:     I/O (24Hr): Intake/Output Summary (Last 24 hours) at 09/28/18 1605  Last data filed at 09/28/18 1512   Gross per 24 hour   Intake              240 ml   Output             2150 ml   Net            -1910 ml       Labs:    Hematology:  Recent Labs      09/26/18   0614 09/26/18   1201  09/27/18   0603  09/28/18   0630   WBC  17.4*   --   17.3*  16.2*   HGB  7.7*  7.4*  7.8*  8.1*   HCT  26.0*  24.2*  27.2*  28.4*   PLT  317   --   337  350     Chemistry:  Recent Labs      09/26/18   0614 09/26/18   1642  09/27/18   0603  09/28/18   0630   NA   --    < >  135  136  139   K   --    < >  3.8  4.1  3.7   CL   --    < >  96*  97*  98   CO2   --    < >  26  29  27   GLUCOSE   --    < >  108*  94  107*   BUN   --    < >  33*  33*  29*   CREATININE   --    < >  2.35*  2.16*  1.95*   MG  2.2   --    --    --    --    CALCIUM   --    < >  7.9*  8.2*  8.3*   CAION  0.94*   --    --   1.10*  1.13    < > = values in this interval not displayed.      Recent Labs      09/26/18   0614 09/27/18   0603  09/28/18   0630   CKTOTAL  533*  635*  276*       Lab Results   Component Value Date/Time    SPECIAL LEFT ARM 3 ML 09/23/2018 08:40 AM     Lab Results   Component Value Date/Time    CULTURE NO GROWTH 5 DAYS 09/23/2018 08:40 AM       No results found for: POCPH, PHART, PH, POCPCO2, UYX6DXG, PCO2, POCPO2, PO2ART, PO2, POCHCO3, FPE0SGZ, HCO3, NBEA, PBEA, BEART, BE, THGBART, THB, UPT0NSX, GANV6FRN, E2VBLDNX, O2SAT, FIO2    Radiology:      Assessment:        Primary Problem  Principal Problem:    Multiple and open wound of lower limb, complicated,

## 2018-09-29 LAB
ABSOLUTE EOS #: 0.43 K/UL (ref 0–0.4)
ABSOLUTE IMMATURE GRANULOCYTE: 0.14 K/UL (ref 0–0.3)
ABSOLUTE LYMPH #: 2 K/UL (ref 1–4.8)
ABSOLUTE MONO #: 1 K/UL (ref 0.1–0.8)
ANION GAP SERPL CALCULATED.3IONS-SCNC: 11 MMOL/L (ref 9–17)
BASOPHILS # BLD: 1 % (ref 0–2)
BASOPHILS ABSOLUTE: 0.14 K/UL (ref 0–0.2)
BUN BLDV-MCNC: 23 MG/DL (ref 8–23)
BUN/CREAT BLD: ABNORMAL (ref 9–20)
CALCIUM IONIZED: 1.04 MMOL/L (ref 1.13–1.33)
CALCIUM SERPL-MCNC: 8 MG/DL (ref 8.6–10.4)
CHLORIDE BLD-SCNC: 99 MMOL/L (ref 98–107)
CO2: 28 MMOL/L (ref 20–31)
CREAT SERPL-MCNC: 1.72 MG/DL (ref 0.5–0.9)
CULTURE: NORMAL
CULTURE: NORMAL
DIFFERENTIAL TYPE: ABNORMAL
EOSINOPHILS RELATIVE PERCENT: 3 % (ref 1–4)
GFR AFRICAN AMERICAN: 36 ML/MIN
GFR NON-AFRICAN AMERICAN: 29 ML/MIN
GFR SERPL CREATININE-BSD FRML MDRD: ABNORMAL ML/MIN/{1.73_M2}
GFR SERPL CREATININE-BSD FRML MDRD: ABNORMAL ML/MIN/{1.73_M2}
GLUCOSE BLD-MCNC: 100 MG/DL (ref 65–105)
GLUCOSE BLD-MCNC: 105 MG/DL (ref 65–105)
GLUCOSE BLD-MCNC: 105 MG/DL (ref 70–99)
GLUCOSE BLD-MCNC: 98 MG/DL (ref 65–105)
HCT VFR BLD CALC: 26.6 % (ref 36.3–47.1)
HEMOGLOBIN: 7.7 G/DL (ref 11.9–15.1)
IMMATURE GRANULOCYTES: 1 %
LYMPHOCYTES # BLD: 14 % (ref 24–44)
Lab: NORMAL
Lab: NORMAL
MAGNESIUM: 2.2 MG/DL (ref 1.6–2.6)
MCH RBC QN AUTO: 25.2 PG (ref 25.2–33.5)
MCHC RBC AUTO-ENTMCNC: 28.9 G/DL (ref 28.4–34.8)
MCV RBC AUTO: 87.2 FL (ref 82.6–102.9)
MONOCYTES # BLD: 7 % (ref 1–7)
MORPHOLOGY: ABNORMAL
NRBC AUTOMATED: 0.2 PER 100 WBC
PDW BLD-RTO: 24.4 % (ref 11.8–14.4)
PLATELET # BLD: 319 K/UL (ref 138–453)
PLATELET ESTIMATE: ABNORMAL
PMV BLD AUTO: 8.9 FL (ref 8.1–13.5)
POTASSIUM SERPL-SCNC: 3.2 MMOL/L (ref 3.7–5.3)
RBC # BLD: 3.05 M/UL (ref 3.95–5.11)
RBC # BLD: ABNORMAL 10*6/UL
SEG NEUTROPHILS: 74 % (ref 36–66)
SEGMENTED NEUTROPHILS ABSOLUTE COUNT: 10.59 K/UL (ref 1.8–7.7)
SODIUM BLD-SCNC: 138 MMOL/L (ref 135–144)
SPECIMEN DESCRIPTION: NORMAL
SPECIMEN DESCRIPTION: NORMAL
STATUS: NORMAL
STATUS: NORMAL
TOTAL CK: 141 U/L (ref 26–192)
WBC # BLD: 14.3 K/UL (ref 3.5–11.3)
WBC # BLD: ABNORMAL 10*3/UL

## 2018-09-29 PROCEDURE — 6360000002 HC RX W HCPCS: Performed by: INTERNAL MEDICINE

## 2018-09-29 PROCEDURE — C9113 INJ PANTOPRAZOLE SODIUM, VIA: HCPCS | Performed by: NURSE PRACTITIONER

## 2018-09-29 PROCEDURE — 80048 BASIC METABOLIC PNL TOTAL CA: CPT

## 2018-09-29 PROCEDURE — 6370000000 HC RX 637 (ALT 250 FOR IP): Performed by: INTERNAL MEDICINE

## 2018-09-29 PROCEDURE — 36415 COLL VENOUS BLD VENIPUNCTURE: CPT

## 2018-09-29 PROCEDURE — 6360000002 HC RX W HCPCS: Performed by: NURSE PRACTITIONER

## 2018-09-29 PROCEDURE — 6370000000 HC RX 637 (ALT 250 FOR IP): Performed by: EMERGENCY MEDICINE

## 2018-09-29 PROCEDURE — 85025 COMPLETE CBC W/AUTO DIFF WBC: CPT

## 2018-09-29 PROCEDURE — 87205 SMEAR GRAM STAIN: CPT

## 2018-09-29 PROCEDURE — 99232 SBSQ HOSP IP/OBS MODERATE 35: CPT | Performed by: INTERNAL MEDICINE

## 2018-09-29 PROCEDURE — 83735 ASSAY OF MAGNESIUM: CPT

## 2018-09-29 PROCEDURE — 97110 THERAPEUTIC EXERCISES: CPT

## 2018-09-29 PROCEDURE — 87070 CULTURE OTHR SPECIMN AEROBIC: CPT

## 2018-09-29 PROCEDURE — 6370000000 HC RX 637 (ALT 250 FOR IP): Performed by: STUDENT IN AN ORGANIZED HEALTH CARE EDUCATION/TRAINING PROGRAM

## 2018-09-29 PROCEDURE — 6370000000 HC RX 637 (ALT 250 FOR IP): Performed by: SURGERY

## 2018-09-29 PROCEDURE — 2580000003 HC RX 258: Performed by: INTERNAL MEDICINE

## 2018-09-29 PROCEDURE — 82550 ASSAY OF CK (CPK): CPT

## 2018-09-29 PROCEDURE — 6370000000 HC RX 637 (ALT 250 FOR IP): Performed by: NURSE PRACTITIONER

## 2018-09-29 PROCEDURE — 82330 ASSAY OF CALCIUM: CPT

## 2018-09-29 PROCEDURE — 97116 GAIT TRAINING THERAPY: CPT

## 2018-09-29 PROCEDURE — 2060000000 HC ICU INTERMEDIATE R&B

## 2018-09-29 PROCEDURE — 82947 ASSAY GLUCOSE BLOOD QUANT: CPT

## 2018-09-29 RX ORDER — POTASSIUM CHLORIDE 7.45 MG/ML
10 INJECTION INTRAVENOUS PRN
Status: DISCONTINUED | OUTPATIENT
Start: 2018-09-29 | End: 2018-10-05 | Stop reason: HOSPADM

## 2018-09-29 RX ORDER — FUROSEMIDE 80 MG
80 TABLET ORAL DAILY
Qty: 60 TABLET | Refills: 3 | Status: SHIPPED | OUTPATIENT
Start: 2018-09-30 | End: 2018-09-30 | Stop reason: HOSPADM

## 2018-09-29 RX ORDER — POLYETHYLENE GLYCOL 3350 17 G/17G
17 POWDER, FOR SOLUTION ORAL DAILY PRN
Qty: 527 G | Refills: 1 | Status: SHIPPED | OUTPATIENT
Start: 2018-09-29 | End: 2018-10-29

## 2018-09-29 RX ORDER — POTASSIUM CHLORIDE 20 MEQ/1
20 TABLET, EXTENDED RELEASE ORAL ONCE
Status: COMPLETED | OUTPATIENT
Start: 2018-09-29 | End: 2018-09-29

## 2018-09-29 RX ORDER — POTASSIUM CHLORIDE 20MEQ/15ML
40 LIQUID (ML) ORAL PRN
Status: DISCONTINUED | OUTPATIENT
Start: 2018-09-29 | End: 2018-10-05 | Stop reason: HOSPADM

## 2018-09-29 RX ORDER — POTASSIUM CHLORIDE 20 MEQ/1
40 TABLET, EXTENDED RELEASE ORAL PRN
Status: DISCONTINUED | OUTPATIENT
Start: 2018-09-29 | End: 2018-10-05 | Stop reason: HOSPADM

## 2018-09-29 RX ORDER — LINEZOLID 600 MG/1
600 TABLET, FILM COATED ORAL EVERY 12 HOURS SCHEDULED
Qty: 28 TABLET | Refills: 0 | Status: SHIPPED | OUTPATIENT
Start: 2018-09-29 | End: 2018-10-02 | Stop reason: HOSPADM

## 2018-09-29 RX ORDER — CARVEDILOL 12.5 MG/1
12.5 TABLET ORAL 2 TIMES DAILY WITH MEALS
Status: DISCONTINUED | OUTPATIENT
Start: 2018-09-29 | End: 2018-10-05

## 2018-09-29 RX ADMIN — OXYCODONE HYDROCHLORIDE 10 MG: 5 TABLET ORAL at 21:32

## 2018-09-29 RX ADMIN — LINEZOLID 600 MG: 600 TABLET, FILM COATED ORAL at 21:27

## 2018-09-29 RX ADMIN — LINEZOLID 600 MG: 600 TABLET, FILM COATED ORAL at 08:57

## 2018-09-29 RX ADMIN — LINEZOLID 600 MG: 600 TABLET, FILM COATED ORAL at 00:51

## 2018-09-29 RX ADMIN — OXYCODONE HYDROCHLORIDE 15 MG: 5 TABLET ORAL at 08:56

## 2018-09-29 RX ADMIN — POLYETHYLENE GLYCOL 3350 34 G: 17 POWDER, FOR SOLUTION ORAL at 08:57

## 2018-09-29 RX ADMIN — CARVEDILOL 12.5 MG: 12.5 TABLET, FILM COATED ORAL at 18:32

## 2018-09-29 RX ADMIN — MULTIPLE VITAMINS W/ MINERALS TAB 1 TABLET: TAB at 08:58

## 2018-09-29 RX ADMIN — PANTOPRAZOLE SODIUM 40 MG: 40 INJECTION, POWDER, FOR SOLUTION INTRAVENOUS at 00:51

## 2018-09-29 RX ADMIN — POTASSIUM CHLORIDE 40 MEQ: 20 TABLET, EXTENDED RELEASE ORAL at 13:57

## 2018-09-29 RX ADMIN — PANTOPRAZOLE SODIUM 40 MG: 40 INJECTION, POWDER, FOR SOLUTION INTRAVENOUS at 08:58

## 2018-09-29 RX ADMIN — Medication 220 MG: at 08:57

## 2018-09-29 RX ADMIN — POTASSIUM CHLORIDE 20 MEQ: 20 TABLET, EXTENDED RELEASE ORAL at 13:56

## 2018-09-29 RX ADMIN — PANTOPRAZOLE SODIUM 40 MG: 40 INJECTION, POWDER, FOR SOLUTION INTRAVENOUS at 21:27

## 2018-09-29 RX ADMIN — METOPROLOL TARTRATE 25 MG: 25 TABLET ORAL at 08:57

## 2018-09-29 RX ADMIN — PIPERACILLIN AND TAZOBACTAM 3.38 G: 3; .375 INJECTION, POWDER, LYOPHILIZED, FOR SOLUTION INTRAVENOUS; PARENTERAL at 21:53

## 2018-09-29 RX ADMIN — Medication 500 MG: at 08:57

## 2018-09-29 RX ADMIN — FUROSEMIDE 80 MG: 40 TABLET ORAL at 08:57

## 2018-09-29 RX ADMIN — OXYCODONE HYDROCHLORIDE 15 MG: 5 TABLET ORAL at 13:56

## 2018-09-29 ASSESSMENT — ENCOUNTER SYMPTOMS
BACK PAIN: 0
CONSTIPATION: 1
COUGH: 0
ABDOMINAL PAIN: 0
BLURRED VISION: 0
ORTHOPNEA: 0
PHOTOPHOBIA: 0
GASTROINTESTINAL NEGATIVE: 1
SPUTUM PRODUCTION: 0
SHORTNESS OF BREATH: 1
RESPIRATORY NEGATIVE: 1
VOMITING: 0
SORE THROAT: 0
SHORTNESS OF BREATH: 0
NAUSEA: 0

## 2018-09-29 ASSESSMENT — PAIN DESCRIPTION - ORIENTATION: ORIENTATION: LOWER;RIGHT;LEFT

## 2018-09-29 ASSESSMENT — PAIN SCALES - GENERAL
PAINLEVEL_OUTOF10: 8
PAINLEVEL_OUTOF10: 7
PAINLEVEL_OUTOF10: 10
PAINLEVEL_OUTOF10: 10

## 2018-09-29 ASSESSMENT — PAIN DESCRIPTION - LOCATION: LOCATION: ABDOMEN;LEG

## 2018-09-29 ASSESSMENT — PAIN DESCRIPTION - PAIN TYPE: TYPE: ACUTE PAIN;CHRONIC PAIN

## 2018-09-29 NOTE — PROGRESS NOTES
Infusions:    dextrose      dextrose       Input/Output:       I/O last 3 completed shifts: In: 260 [P.O.:260]  Out: 1350 [Urine:1350]. Patient Vitals for the past 96 hrs (Last 3 readings):   Weight   18 0620 (!) 316 lb 4.8 oz (143.5 kg)   18 0615 (!) 319 lb 11.2 oz (145 kg)       Vital Signs:   Temperature:  Temp: 97.5 °F (36.4 °C)  TMax:   Temp (24hrs), Av °F (36.7 °C), Min:97.5 °F (36.4 °C), Max:99.1 °F (37.3 °C)    Respirations:  Resp: 16  Pulse:   Pulse: 89  BP:    BP: (!) 150/114  BP Range: Systolic (84KSL), LGH:493 , Min:118 , AOB:313       Diastolic (45FHO), BMA:20, Min:49, Max:114      Physical Examination:     General:  AAO x 3, speaking in full sentences, no accessory muscle use. HEENT: Atraumatic, normocephalic, no throat congestion, moist mucosa. Eyes:   Pupils equal, round and reactive to light, EOMI. Neck:   No JVD, no thyromegaly, no lymphadenopathy. Chest:  Bilateral vesicular breath sounds, no rales or wheezes. Cardiac:  S1 S2 RR, no murmurs, gallops or rubs, JVP not raised. Abdomen: Soft, non-tender, no masses or organomegaly, BS audible. :   No suprapubic or flank tenderness. Neuro:  AAO x 3, No FND. SKIN:  No rashes, good skin turgor. Extremities:  Significant 2+ lower extremity edema with wraps in place below the knees and seepage posteriorly from the wounds.    status post debridement    Labs:       Recent Labs      18   0603  18   0630  18   0618   WBC  17.3*  16.2*  14.3*   RBC  3.19*  3.31*  3.05*   HGB  7.8*  8.1*  7.7*   HCT  27.2*  28.4*  26.6*   MCV  85.3  85.8  87.2   MCH  24.5*  24.5*  25.2   MCHC  28.7  28.5  28.9   RDW  23.2*  24.3*  24.4*   PLT  337  350  319   MPV  9.1  9.1  8.9      BMP:   Recent Labs      18   0603  18   0630  18   0618   NA  136  139  138   K  4.1  3.7  3.2*   CL  97*  98  99   CO2  29  27  28   BUN  33*  29*  23   CREATININE  2.16*  1.95*  1.72*   GLUCOSE  94  107*  105*   CALCIUM  8.2* 8. 3*  8.0*      Phosphorus:   No results for input(s): PHOS in the last 72 hours. Magnesium:    Recent Labs      09/29/18   0618   MG  2.2     Albumin:  No results for input(s): LABALBU in the last 72 hours. BNP:    No results found for: BNP  SALMA:      Lab Results   Component Value Date    SALMA NEGATIVE 09/23/2018     SPEP:  Lab Results   Component Value Date    PROT 6.6 09/24/2018    PATH ELECTRONICALLY SIGNED.  Ayana Coyle M.D. 09/23/2018     UPEP:   No results found for: LABPE  C3:     Lab Results   Component Value Date    C3 124 09/23/2018     C4:     Lab Results   Component Value Date    C4 19 09/23/2018     MPO ANCA:     Lab Results   Component Value Date    MPO 7 09/23/2018     PR3 ANCA:     Lab Results   Component Value Date    PR3 8 09/23/2018     Anti-GBM:   No results found for: GBMABIGG  Hep BsAg:         Lab Results   Component Value Date    HEPBSAG NONREACTIVE 09/23/2018     Hep C AB:          Lab Results   Component Value Date    HEPCAB NONREACTIVE 09/23/2018       Urinalysis/Chemistries:      Lab Results   Component Value Date    NITRU NEGATIVE 09/23/2018    COLORU YELLOW 09/23/2018    PHUR 5.0 09/23/2018    WBCUA 2 TO 5 09/23/2018    RBCUA 0 TO 2 09/23/2018    MUCUS 1+ 09/23/2018    TRICHOMONAS NOT REPORTED 09/23/2018    YEAST NOT REPORTED 09/23/2018    BACTERIA FEW 09/23/2018    SPECGRAV 1.022 09/23/2018    LEUKOCYTESUR TRACE 09/23/2018    UROBILINOGEN Normal 09/23/2018    BILIRUBINUR NEGATIVE 09/23/2018    GLUCOSEU NEGATIVE 09/23/2018    KETUA TRACE 09/23/2018    AMORPHOUS NOT REPORTED 09/23/2018     Urine Sodium:     Lab Results   Component Value Date    GAYE <20 09/23/2018     Urine Potassium:  No results found for: KUR  Urine Chloride:  No results found for: CLUR  Urine Osmolarity:   Lab Results   Component Value Date    OSMOU 485 09/23/2018     Urine Protein:   No components found for: TOTALPROTEIN, URINE   Urine Creatinine:     Lab Results   Component Value Date    LABCREA 213.9 09/23/2018     Urine Eosinophils:  No components found for: UEOS    Radiology:     CXR:     Assessment:     1. Acute Kidney Injury: secondary to ischemic ATN from systemic inflammatory response syndrome, hypotension low-flow, Aggravated by NSAID use. Baseline creatinine not known with the serum creatinine on presentation was 3, appears to be improved significantly, nonoliguric. 2. Chronic kidney disease suspected to to be stage III, ultrasound shows, asymmetrical kidneys with the right 11.5, left 9.2 cm. No baseline creatinine available, suspect it to be around 1.5  3.  mild volume overload, resolved  4. Atypical chest pain  5. Chronic lower extremity ulcers with necrotic changes s/p debridement 9/24   6. Hyperkalemia resolved  7. Anemia of chronic disease with low iron saturation  8. Volume overload, weight has gone up by at least 10 kg  9. Hypokalemia froom resolving ATN    Plan:   1. Continue Lasix 80 mg oral daily. 2. Monitor BP, HR with patient having some dizziness and lightheadedness at times  3. Follow BMP  4. Daily weights  5. Strict intake and output  6. Can d/c grant  7. Resume coreg and P CCB  8, nothing else to add will sign off call for questions  9. Replace K  9. outpt follow up with dr Sherrie Wallace in 3-4 weeks  10. BMP 1 week after d/c  Nutrition   Please ensure that patient is on a renal diet/TF. Avoid nephrotoxic drugs/contrast exposure. We will continue to follow along with you.

## 2018-09-29 NOTE — PROGRESS NOTES
[facility]    Gastroenterology Daily Progress Note      Patient:   Kelechi Dears   :    1950   Facility:   [address] [facility]   Date:     2018  Consultant:   Jesus Mcfarland CNP      Subjective:     77 y.o. female admitted 2018 with Multiple and open wound of lower limb, complicated, unspecified laterality, initial encounter [S84.688C] and seen for Melena, hematochezia, and anemia. Patient seen and examined. Tolerating diet. Was able to have 2 large hard/formed stools last evening after receiving enema. No BM as of this A.M. Hemoglobin remained stable.         Objective     Scheduled Meds:   furosemide  80 mg Oral Daily    polyethylene glycol  34 g Oral Daily    zinc sulfate  220 mg Oral Daily    therapeutic multivitamin-minerals  1 tablet Oral Daily    linezolid  600 mg Oral 2 times per day    metoprolol tartrate  25 mg Oral BID    darbepoetin reva-polysorbate  60 mcg Subcutaneous Weekly    vitamin C  500 mg Oral Daily    pantoprazole  40 mg Intravenous BID    And    sodium chloride (PF)  10 mL Intravenous Daily    sodium hypochlorite   Irrigation Daily    calcium gluconate  1 g Intravenous Once       Vital Signs:  /64   Pulse 89   Temp 97.5 °F (36.4 °C) (Oral)   Resp 23   Ht 5' 3.5\" (1.613 m)   Wt (!) 316 lb 4.8 oz (143.5 kg)   LMP 2018 (Within Days)   SpO2 98%   BMI 55.15 kg/m²      Physical Exam:   General appearance: Alert & oriented, NAD  Lungs: CTA bilaterally    Heart: S1S2 RRR  Abdomen: Soft, morbidly obese, Nontender, Not distended, BS WNL  Skin: No jaundice, No clubbing, No cyanosis    Lab and Imaging Review     CBC  Recent Labs      18   0603  18   0630  18   0618   WBC  17.3*  16.2*  14.3*   HGB  7.8*  8.1*  7.7*   HCT  27.2*  28.4*  26.6*   MCV  85.3  85.8  87.2   MCH  24.5*  24.5*  25.2   MCHC  28.7  28.5  28.9   PLT  337  350  319       BMP  Recent Labs      18   0603  18   0630

## 2018-09-29 NOTE — PROGRESS NOTES
Physical Therapy  Facility/Department: [facility] CAR 3  Daily Treatment Note  NAME: Mayra Schmitz  : 1950  MRN: [id number]    Date of Service: 2018    Discharge Recommendations:  [address]        Patient Diagnosis(es): The primary encounter diagnosis was Bilateral leg pain. Diagnoses of Anemia, unspecified type, Acute renal failure, unspecified acute renal failure type ([address] and Hyperkalemia were also pertinent to this visit. has a past medical history of SAM (acute kidney injury) ([address] Heart attack ([address] and Skin lesions. has a past surgical history that includes Tubal ligation () and pr office/outpt visit,procedure only (Bilateral, 2018). Restrictions  Restrictions/Precautions  Restrictions/Precautions: General Precautions, Fall Risk  Required Braces or Orthoses?: No  Lower Extremity Weight Bearing Restrictions  Right Lower Extremity Weight Bearing:  (.)  Left Lower Extremity Weight Bearing:  (.)  Position Activity Restriction  Other position/activity restrictions: Activity as tolerated   Subjective   General  Response To Previous Treatment: Patient with no complaints from previous session. Subjective  Subjective: RN and pt agreed to PT. Patient awake in bed upon arrival.   Pain Screening  Patient Currently in Pain: Yes  Pain Assessment  Pain Assessment: 0-10  Pain Level: 7  Pain Type: Acute pain;Chronic pain  Pain Location: Abdomen;Leg  Pain Orientation: Lower;Right;Left  Orientation  Orientation  Overall Orientation Status: Within Functional Limits  Objective   Bed mobility  Supine to Sit: Minimal assistance  Sit to Supine: Minimal assistance  Scooting: Minimal assistance  Comment: assist with BLE (support under heels) for supine to sit transfer  Transfers  Sit to Stand:  Moderate Assistance  Stand to sit: Minimal Assistance  Bed to Chair: Minimal assistance  Ambulation 1  Surface: level tile  Device: Rolling Walker  Assistance: Contact guard assistance  Quality of Gait: several steps to turn to chair, 3' total, no LOB, apparent pain, no buckling,   Distance: 3'  Comments: Standing with L turn 90deg to chair     Exercises:  Quad sets, ankle pumps x 20 reps. Assessment   Body structures, Functions, Activity limitations: Decreased functional mobility ; Decreased sensation;Decreased balance;Decreased endurance;Decreased strength  Assessment: Patient ambulated 3' to step with Emma, able to complete some LE exercises but after ambulation legs were \"burning\" in pain. RN notified for pain meds.   Prognosis: Good  Patient Education: DVT prevention  REQUIRES PT FOLLOW UP: Yes  Activity Tolerance  Activity Tolerance: Patient limited by pain     Goals  Short term goals  Time Frame for Short term goals: 14 visits  Short term goal 1: Pt will be CGA with bed mobility  Short term goal 2: Pt will be CGA transfers  Short term goal 3: Pt will be Pt will amb 20' RW Emma or least restrictive AD  Short term goal 4: Pt will be safe to attempt steps    Plan    Plan  Times per week: 5-6x/wk  Current Treatment Recommendations: Strengthening, Balance Training, Endurance Training, Functional Mobility Training, Gait Training, Transfer Training, Patient/Caregiver Education & Training, Equipment Evaluation, Education, & procurement, Home Exercise Program  Safety Devices  Type of devices: Call light within reach, Patient at risk for falls, Gait belt, Nurse notified, Left in chair, All fall risk precautions in place  Restraints  Initially in place: No     Therapy Time   Individual Concurrent Group Co-treatment   Time In 0826         Time Out 0852         Minutes 26         Timed Code Treatment Minutes: [address], PTA

## 2018-09-30 PROBLEM — E66.01 MORBID OBESITY (HCC): Chronic | Status: ACTIVE | Noted: 2018-09-30

## 2018-09-30 PROBLEM — E43 SEVERE PROTEIN-CALORIE MALNUTRITION (HCC): Chronic | Status: ACTIVE | Noted: 2018-09-30

## 2018-09-30 LAB
ABSOLUTE EOS #: 0.26 K/UL (ref 0–0.4)
ABSOLUTE IMMATURE GRANULOCYTE: 0.26 K/UL (ref 0–0.3)
ABSOLUTE LYMPH #: 1.06 K/UL (ref 1–4.8)
ABSOLUTE MONO #: 0.13 K/UL (ref 0.1–0.8)
ANION GAP SERPL CALCULATED.3IONS-SCNC: 13 MMOL/L (ref 9–17)
BASOPHILS # BLD: 0 % (ref 0–2)
BASOPHILS ABSOLUTE: 0 K/UL (ref 0–0.2)
BUN BLDV-MCNC: 22 MG/DL (ref 8–23)
BUN/CREAT BLD: ABNORMAL (ref 9–20)
CALCIUM SERPL-MCNC: 8.3 MG/DL (ref 8.6–10.4)
CHLORIDE BLD-SCNC: 97 MMOL/L (ref 98–107)
CO2: 29 MMOL/L (ref 20–31)
CREAT SERPL-MCNC: 1.66 MG/DL (ref 0.5–0.9)
DIFFERENTIAL TYPE: ABNORMAL
EOSINOPHILS RELATIVE PERCENT: 2 % (ref 1–4)
GFR AFRICAN AMERICAN: 37 ML/MIN
GFR NON-AFRICAN AMERICAN: 31 ML/MIN
GFR SERPL CREATININE-BSD FRML MDRD: ABNORMAL ML/MIN/{1.73_M2}
GFR SERPL CREATININE-BSD FRML MDRD: ABNORMAL ML/MIN/{1.73_M2}
GLUCOSE BLD-MCNC: 113 MG/DL (ref 65–105)
GLUCOSE BLD-MCNC: 118 MG/DL (ref 65–105)
GLUCOSE BLD-MCNC: 131 MG/DL (ref 65–105)
GLUCOSE BLD-MCNC: 79 MG/DL (ref 65–105)
GLUCOSE BLD-MCNC: 95 MG/DL (ref 70–99)
HCT VFR BLD CALC: 26.3 % (ref 36.3–47.1)
HEMOGLOBIN: 7.5 G/DL (ref 11.9–15.1)
IMMATURE GRANULOCYTES: 2 %
LYMPHOCYTES # BLD: 8 % (ref 24–44)
MCH RBC QN AUTO: 24.8 PG (ref 25.2–33.5)
MCHC RBC AUTO-ENTMCNC: 28.5 G/DL (ref 28.4–34.8)
MCV RBC AUTO: 87.1 FL (ref 82.6–102.9)
MONOCYTES # BLD: 1 % (ref 1–7)
MORPHOLOGY: ABNORMAL
NRBC AUTOMATED: 0 PER 100 WBC
PDW BLD-RTO: 24.9 % (ref 11.8–14.4)
PLATELET # BLD: 314 K/UL (ref 138–453)
PLATELET ESTIMATE: ABNORMAL
PMV BLD AUTO: 8.7 FL (ref 8.1–13.5)
POTASSIUM SERPL-SCNC: 3.9 MMOL/L (ref 3.7–5.3)
PREALBUMIN: 8 MG/DL (ref 20–40)
RBC # BLD: 3.02 M/UL (ref 3.95–5.11)
RBC # BLD: ABNORMAL 10*6/UL
SEG NEUTROPHILS: 87 % (ref 36–66)
SEGMENTED NEUTROPHILS ABSOLUTE COUNT: 11.49 K/UL (ref 1.8–7.7)
SODIUM BLD-SCNC: 139 MMOL/L (ref 135–144)
WBC # BLD: 13.2 K/UL (ref 3.5–11.3)
WBC # BLD: ABNORMAL 10*3/UL

## 2018-09-30 PROCEDURE — 6370000000 HC RX 637 (ALT 250 FOR IP): Performed by: SURGERY

## 2018-09-30 PROCEDURE — 6370000000 HC RX 637 (ALT 250 FOR IP): Performed by: INTERNAL MEDICINE

## 2018-09-30 PROCEDURE — C9113 INJ PANTOPRAZOLE SODIUM, VIA: HCPCS | Performed by: NURSE PRACTITIONER

## 2018-09-30 PROCEDURE — 6370000000 HC RX 637 (ALT 250 FOR IP): Performed by: STUDENT IN AN ORGANIZED HEALTH CARE EDUCATION/TRAINING PROGRAM

## 2018-09-30 PROCEDURE — 6360000002 HC RX W HCPCS: Performed by: NURSE PRACTITIONER

## 2018-09-30 PROCEDURE — 82947 ASSAY GLUCOSE BLOOD QUANT: CPT

## 2018-09-30 PROCEDURE — 2700000000 HC OXYGEN THERAPY PER DAY

## 2018-09-30 PROCEDURE — 6360000002 HC RX W HCPCS: Performed by: INTERNAL MEDICINE

## 2018-09-30 PROCEDURE — 2580000003 HC RX 258: Performed by: NURSE PRACTITIONER

## 2018-09-30 PROCEDURE — 80048 BASIC METABOLIC PNL TOTAL CA: CPT

## 2018-09-30 PROCEDURE — 99232 SBSQ HOSP IP/OBS MODERATE 35: CPT | Performed by: INTERNAL MEDICINE

## 2018-09-30 PROCEDURE — 2580000003 HC RX 258: Performed by: INTERNAL MEDICINE

## 2018-09-30 PROCEDURE — 6370000000 HC RX 637 (ALT 250 FOR IP): Performed by: EMERGENCY MEDICINE

## 2018-09-30 PROCEDURE — 2060000000 HC ICU INTERMEDIATE R&B

## 2018-09-30 PROCEDURE — 36415 COLL VENOUS BLD VENIPUNCTURE: CPT

## 2018-09-30 PROCEDURE — 6370000000 HC RX 637 (ALT 250 FOR IP): Performed by: NURSE PRACTITIONER

## 2018-09-30 PROCEDURE — 85025 COMPLETE CBC W/AUTO DIFF WBC: CPT

## 2018-09-30 PROCEDURE — 84134 ASSAY OF PREALBUMIN: CPT

## 2018-09-30 RX ORDER — FUROSEMIDE 40 MG/1
40 TABLET ORAL DAILY
Qty: 60 TABLET | Refills: 3 | Status: ON HOLD | OUTPATIENT
Start: 2018-10-01 | End: 2019-08-27 | Stop reason: SDUPTHER

## 2018-09-30 RX ORDER — PIPERACILLIN SODIUM, TAZOBACTAM SODIUM 3; .375 G/15ML; G/15ML
3.38 INJECTION, POWDER, LYOPHILIZED, FOR SOLUTION INTRAVENOUS EVERY 8 HOURS
Qty: 12 EACH | Refills: 3 | Status: SHIPPED | OUTPATIENT
Start: 2018-09-30 | End: 2018-10-02 | Stop reason: HOSPADM

## 2018-09-30 RX ORDER — FUROSEMIDE 40 MG/1
40 TABLET ORAL DAILY
Status: DISCONTINUED | OUTPATIENT
Start: 2018-10-01 | End: 2018-10-05 | Stop reason: HOSPADM

## 2018-09-30 RX ADMIN — MULTIPLE VITAMINS W/ MINERALS TAB 1 TABLET: TAB at 08:26

## 2018-09-30 RX ADMIN — CARVEDILOL 12.5 MG: 12.5 TABLET, FILM COATED ORAL at 16:23

## 2018-09-30 RX ADMIN — PANTOPRAZOLE SODIUM 40 MG: 40 INJECTION, POWDER, FOR SOLUTION INTRAVENOUS at 20:53

## 2018-09-30 RX ADMIN — OXYCODONE HYDROCHLORIDE 10 MG: 5 TABLET ORAL at 03:49

## 2018-09-30 RX ADMIN — FUROSEMIDE 80 MG: 40 TABLET ORAL at 08:26

## 2018-09-30 RX ADMIN — PIPERACILLIN AND TAZOBACTAM 3.38 G: 3; .375 INJECTION, POWDER, LYOPHILIZED, FOR SOLUTION INTRAVENOUS; PARENTERAL at 20:53

## 2018-09-30 RX ADMIN — PANTOPRAZOLE SODIUM 40 MG: 40 INJECTION, POWDER, FOR SOLUTION INTRAVENOUS at 08:25

## 2018-09-30 RX ADMIN — Medication 10 ML: at 08:32

## 2018-09-30 RX ADMIN — LINEZOLID 600 MG: 600 TABLET, FILM COATED ORAL at 20:53

## 2018-09-30 RX ADMIN — PIPERACILLIN AND TAZOBACTAM 3.38 G: 3; .375 INJECTION, POWDER, LYOPHILIZED, FOR SOLUTION INTRAVENOUS; PARENTERAL at 05:54

## 2018-09-30 RX ADMIN — PIPERACILLIN AND TAZOBACTAM 3.38 G: 3; .375 INJECTION, POWDER, LYOPHILIZED, FOR SOLUTION INTRAVENOUS; PARENTERAL at 13:50

## 2018-09-30 RX ADMIN — LINEZOLID 600 MG: 600 TABLET, FILM COATED ORAL at 08:26

## 2018-09-30 RX ADMIN — POLYETHYLENE GLYCOL 3350 34 G: 17 POWDER, FOR SOLUTION ORAL at 08:25

## 2018-09-30 RX ADMIN — CARVEDILOL 12.5 MG: 12.5 TABLET, FILM COATED ORAL at 08:26

## 2018-09-30 RX ADMIN — OXYCODONE HYDROCHLORIDE 10 MG: 5 TABLET ORAL at 16:06

## 2018-09-30 RX ADMIN — Medication 220 MG: at 08:26

## 2018-09-30 RX ADMIN — Medication 500 MG: at 08:26

## 2018-09-30 ASSESSMENT — ENCOUNTER SYMPTOMS
SPUTUM PRODUCTION: 0
CONSTIPATION: 1
NAUSEA: 0
ORTHOPNEA: 0
ABDOMINAL PAIN: 0
SHORTNESS OF BREATH: 1
COUGH: 0
VOMITING: 0

## 2018-09-30 ASSESSMENT — PAIN SCALES - GENERAL
PAINLEVEL_OUTOF10: 8
PAINLEVEL_OUTOF10: 8

## 2018-09-30 NOTE — PROGRESS NOTES
LYMPHSABS 2.00 09/29/2018    EOSABS 0.43 09/29/2018    BASOSABS 0.14 09/29/2018    DIFFTYPE NOT REPORTED 09/29/2018     CMP:    Lab Results   Component Value Date     09/29/2018    K 3.2 09/29/2018    CL 99 09/29/2018    CO2 28 09/29/2018    BUN 23 09/29/2018    CREATININE 1.72 09/29/2018    GFRAA 36 09/29/2018    LABGLOM 29 09/29/2018    GLUCOSE 105 09/29/2018    PROT 6.6 09/24/2018    LABALBU 2.3 09/24/2018    CALCIUM 8.0 09/29/2018    BILITOT 0.50 09/24/2018    ALKPHOS 80 09/24/2018    AST 85 09/24/2018    ALT 18 09/24/2018       Radiology Review:    No new imaging. ASSESSMENT:  Active Hospital Problems    Diagnosis Date Noted    Multiple superficial wounds with infection [T07. XXXA, L08.9]     Slow transit constipation [K59.01] 09/27/2018    Bandemia [D72.825]     Non-traumatic rhabdomyolysis [M62.82] 09/26/2018    Iron deficiency anemia [D50.9]     Hyponatremia [E87.1] 09/25/2018    Hypocalcemia [E83.51] 09/24/2018    Hypoalbuminemia [E88.09] 09/24/2018    Infected skin ulcer with fat layer exposed ([facility] [F44.775, L08.9]     Multiple and open wound of lower limb, complicated, unspecified laterality, initial encounter [S81.809A] 09/23/2018    Blood loss anemia [D50.0] 09/23/2018    Hyperkalemia [E87.5] 09/23/2018    SAM (acute kidney injury) ([facility] [N17.9] 09/23/2018    Rectal bleeding [K62.5]     Bilateral leg pain [M79.604, M79.605]     Acute renal failure ([facility] [P13.5]     Metabolic acidosis [E62.6]     Heart attack [facility]) [I21.9] 01/01/1982       77 y.o. female with chronic lower extremity wounds  - POD# 6 s/p Excisional debridement bilateral lower extremities w/ Acell Placement    Plan:  1. Elevate b/l LEs. Plan for dressing takedown on 10/2 per general surgery. Ok to reinforce ACEs and outer Kerlix rolls as needed, further recs to follow after takedown, please have surgi-lube at bedside  2. Analgesia:  Pain control per primary, limit narcotics  3.  Continue medical management and

## 2018-09-30 NOTE — PROGRESS NOTES
Renal Progress Note    Patient :  Sarah Toth; 77 y.o. MRN# [id number]  Location:  8698/2427-57  Attending:  Stephanie Rizvi DO  Admit Date:  9/23/2018   Hospital Day: 7      Subjective:     Patient seen and examined at bedside. Hospital day #6. Both lower extremities are bandaged below the knee, with dressings changed this morning. UO remains low. Creatinine is trending down 2.16 -> 1.95 > 1.7. Currently on Linezolid. Hemodynamically stable. She complains of some abdominal discomfort, nausea, decreased appetite, and has still yet to have a bowel movement, but is passing gas. She denies any fevers, chills, vomiting, or diarrhea. Enemas being given, opioids continue. BP now somewhat high. UO good. Outpatient Medications:     Prescriptions Prior to Admission: Ascorbic Acid (VITAMIN C) 500 MG tablet, Take 1 tablet by mouth daily  gabapentin (NEURONTIN) 300 MG capsule, Take 1 capsule by mouth 3 times daily Take 300mg po nightly for 3 days, then 300mg po bid for 3 days, then 300mg po tid.   carvedilol (COREG) 12.5 MG tablet, Take 1 tablet by mouth 2 times daily (with meals)  NIFEdipine (NIFEDICAL XL) 60 MG CR tablet, Take 1 tablet by mouth daily  ranitidine (ZANTAC) 150 MG tablet, Take 1 tablet by mouth 2 times daily  [DISCONTINUED] spironolactone (ALDACTONE) 25 MG tablet, Take 1 tablet by mouth daily    Current Medications:     Scheduled Meds:    [START ON 10/1/2018] furosemide  40 mg Oral Daily    carvedilol  12.5 mg Oral BID WC    piperacillin-tazobactam  3.375 g Intravenous Q8H    polyethylene glycol  34 g Oral Daily    zinc sulfate  220 mg Oral Daily    therapeutic multivitamin-minerals  1 tablet Oral Daily    linezolid  600 mg Oral 2 times per day    darbepoetin reva-polysorbate  60 mcg Subcutaneous Weekly    vitamin C  500 mg Oral Daily    pantoprazole  40 mg Intravenous BID    And    sodium chloride (PF)  10 mL Intravenous Daily    sodium hypochlorite   Irrigation Daily    calcium gluconate 1 g Intravenous Once     Continuous Infusions:    dextrose      dextrose       Input/Output:       I/O last 3 completed shifts: In: 369 [P.O.:300; I.V.:69]  Out: 800 [Urine:800]. Patient Vitals for the past 96 hrs (Last 3 readings):   Weight   18 0345 297 lb 11.2 oz (135 kg)   18 0620 (!) 316 lb 4.8 oz (143.5 kg)   18 0615 (!) 319 lb 11.2 oz (145 kg)       Vital Signs:   Temperature:  Temp: 97.7 °F (36.5 °C)  TMax:   Temp (24hrs), Av.6 °F (36.4 °C), Min:97.1 °F (36.2 °C), Max:97.9 °F (36.6 °C)    Respirations:  Resp: 16  Pulse:   Pulse: 78  BP:    BP: (!) 111/41  BP Range: Systolic (78OFL), DUT:923 , Min:111 , JRN:084       Diastolic (29MKC), QHU:28, Min:39, Max:44      Physical Examination:     General:  AAO x 3, speaking in full sentences, no accessory muscle use. HEENT: Atraumatic, normocephalic, no throat congestion, moist mucosa. Eyes:   Pupils equal, round and reactive to light, EOMI. Neck:   No JVD, no thyromegaly, no lymphadenopathy. Chest:  Bilateral vesicular breath sounds, no rales or wheezes. Cardiac:  S1 S2 RR, no murmurs, gallops or rubs, JVP not raised. Abdomen: Soft, non-tender, no masses or organomegaly, BS audible. :   No suprapubic or flank tenderness. Neuro:  AAO x 3, No FND. SKIN:  No rashes, good skin turgor. Extremities:  Significant 2+ lower extremity edema with wraps in place below the knees and seepage posteriorly from the wounds.    status post debridement    Labs:       Recent Labs      18   0630  18   0618  18   0651   WBC  16.2*  14.3*  13.2*   RBC  3.31*  3.05*  3.02*   HGB  8.1*  7.7*  7.5*   HCT  28.4*  26.6*  26.3*   MCV  85.8  87.2  87.1   MCH  24.5*  25.2  24.8*   MCHC  28.5  28.9  28.5   RDW  24.3*  24.4*  24.9*   PLT  350  319  314   MPV  9.1  8.9  8.7      BMP:   Recent Labs      18   0630  18   0618  18   0651   NA  139  138  139   K  3.7  3.2*  3.9   CL  98  99  97*   CO2  27  28  29   BUN  29*  23 22   CREATININE  1.95*  1.72*  1.66*   GLUCOSE  107*  105*  95   CALCIUM  8.3*  8.0*  8.3*      Phosphorus:   No results for input(s): PHOS in the last 72 hours. Magnesium:    Recent Labs      09/29/18   0618   MG  2.2     Albumin:  No results for input(s): LABALBU in the last 72 hours. BNP:    No results found for: BNP  SALMA:      Lab Results   Component Value Date    SALMA NEGATIVE 09/23/2018     SPEP:  Lab Results   Component Value Date    PROT 6.6 09/24/2018    PATH ELECTRONICALLY SIGNED.  Reagan Pearl M.D. 09/23/2018     UPEP:   No results found for: LABPE  C3:     Lab Results   Component Value Date    C3 124 09/23/2018     C4:     Lab Results   Component Value Date    C4 19 09/23/2018     MPO ANCA:     Lab Results   Component Value Date    MPO 7 09/23/2018     PR3 ANCA:     Lab Results   Component Value Date    PR3 8 09/23/2018     Anti-GBM:   No results found for: GBMABIGG  Hep BsAg:         Lab Results   Component Value Date    HEPBSAG NONREACTIVE 09/23/2018     Hep C AB:          Lab Results   Component Value Date    HEPCAB NONREACTIVE 09/23/2018       Urinalysis/Chemistries:      Lab Results   Component Value Date    NITRU NEGATIVE 09/23/2018    COLORU YELLOW 09/23/2018    PHUR 5.0 09/23/2018    WBCUA 2 TO 5 09/23/2018    RBCUA 0 TO 2 09/23/2018    MUCUS 1+ 09/23/2018    TRICHOMONAS NOT REPORTED 09/23/2018    YEAST NOT REPORTED 09/23/2018    BACTERIA FEW 09/23/2018    SPECGRAV 1.022 09/23/2018    LEUKOCYTESUR TRACE 09/23/2018    UROBILINOGEN Normal 09/23/2018    BILIRUBINUR NEGATIVE 09/23/2018    GLUCOSEU NEGATIVE 09/23/2018    KETUA TRACE 09/23/2018    AMORPHOUS NOT REPORTED 09/23/2018     Urine Sodium:     Lab Results   Component Value Date    GAYE <20 09/23/2018     Urine Potassium:  No results found for: KUR  Urine Chloride:  No results found for: CLUR  Urine Osmolarity:   Lab Results   Component Value Date    OSMOU 485 09/23/2018     Urine Protein:   No components found for: Hank Hallmark

## 2018-09-30 NOTE — PROGRESS NOTES
sounds. Pulmonary/Chest: Effort normal and breath sounds normal. No respiratory distress. She has no wheezes. She has no rales. Abdominal: Soft. Bowel sounds are normal. She exhibits no distension. There is no tenderness. There is no rebound and no guarding. Musculoskeletal: She exhibits edema and tenderness. Neurological: She is alert and oriented to person, place, and time. Skin: Skin is warm and dry. She is not diaphoretic. Both legs wrapped, dry, clean     Previous photos have revealed multiple wounds:                            Data:     I/O (24Hr):     Intake/Output Summary (Last 24 hours) at 09/30/18 1217  Last data filed at 09/30/18 0616   Gross per 24 hour   Intake              369 ml   Output              800 ml   Net             -431 ml       Labs:    Hematology:  Recent Labs      09/28/18 0630 09/29/18 0618 09/30/18   0651   WBC  16.2*  14.3*  13.2*   HGB  8.1*  7.7*  7.5*   HCT  28.4*  26.6*  26.3*   PLT  350  319  314     Chemistry:  Recent Labs      09/28/18 0630 09/29/18 0618  09/30/18   0651   NA  139  138  139   K  3.7  3.2*  3.9   CL  98  99  97*   CO2  27  28  29   GLUCOSE  107*  105*  95   BUN  29*  23  22   CREATININE  1.95*  1.72*  1.66*   MG   --   2.2   --    CALCIUM  8.3*  8.0*  8.3*   CAION  1.13  1.04*   --      Recent Labs      09/28/18 0630 09/29/18 0618   CKTOTAL  276*  141       Lab Results   Component Value Date/Time    SPECIAL NOT REPORTED 09/29/2018 07:21 AM     Lab Results   Component Value Date/Time    CULTURE Pending 09/29/2018 07:21 AM       No results found for: POCPH, PHART, PH, POCPCO2, EWY2PFO, PCO2, POCPO2, PO2ART, PO2, POCHCO3, VCT2GVH, HCO3, NBEA, PBEA, BEART, BE, THGBART, THB, TXI8WWX, GQNW8XWI, D1GMPNTL, O2SAT, FIO2    Radiology:      Assessment:        Primary Problem  Principal Problem:    Multiple and open wound of lower limb, complicated, unspecified laterality, initial encounter  Active Problems:    Blood loss anemia    Hyperkalemia CARDIOLOGY  IP CONSULT TO CARDIOLOGY  IP CONSULT TO SOCIAL WORK  IP CONSULT TO IV TEAM  IP CONSULT TO HOME CARE NEEDS    Aaron Lepe DO  9/30/2018  12:17 PM

## 2018-10-01 LAB
ABSOLUTE EOS #: 0.48 K/UL (ref 0–0.44)
ABSOLUTE IMMATURE GRANULOCYTE: 0.12 K/UL (ref 0–0.3)
ABSOLUTE LYMPH #: 1.8 K/UL (ref 1.1–3.7)
ABSOLUTE MONO #: 0.6 K/UL (ref 0.1–1.2)
ANION GAP SERPL CALCULATED.3IONS-SCNC: 9 MMOL/L (ref 9–17)
BASOPHILS # BLD: 1 % (ref 0–2)
BASOPHILS ABSOLUTE: 0.12 K/UL (ref 0–0.2)
BUN BLDV-MCNC: 23 MG/DL (ref 8–23)
BUN/CREAT BLD: ABNORMAL (ref 9–20)
CALCIUM SERPL-MCNC: 7.9 MG/DL (ref 8.6–10.4)
CHLORIDE BLD-SCNC: 98 MMOL/L (ref 98–107)
CO2: 28 MMOL/L (ref 20–31)
CREAT SERPL-MCNC: 1.96 MG/DL (ref 0.5–0.9)
DIFFERENTIAL TYPE: ABNORMAL
EOSINOPHILS RELATIVE PERCENT: 4 % (ref 1–4)
GFR AFRICAN AMERICAN: 31 ML/MIN
GFR NON-AFRICAN AMERICAN: 25 ML/MIN
GFR SERPL CREATININE-BSD FRML MDRD: ABNORMAL ML/MIN/{1.73_M2}
GFR SERPL CREATININE-BSD FRML MDRD: ABNORMAL ML/MIN/{1.73_M2}
GLUCOSE BLD-MCNC: 102 MG/DL (ref 65–105)
GLUCOSE BLD-MCNC: 107 MG/DL (ref 65–105)
GLUCOSE BLD-MCNC: 92 MG/DL (ref 70–99)
GLUCOSE BLD-MCNC: 95 MG/DL (ref 65–105)
GLUCOSE BLD-MCNC: 97 MG/DL (ref 65–105)
HCT VFR BLD CALC: 25.2 % (ref 36.3–47.1)
HEMOGLOBIN: 7.2 G/DL (ref 11.9–15.1)
IMMATURE GRANULOCYTES: 1 %
LYMPHOCYTES # BLD: 15 % (ref 24–43)
MCH RBC QN AUTO: 25.2 PG (ref 25.2–33.5)
MCHC RBC AUTO-ENTMCNC: 28.6 G/DL (ref 28.4–34.8)
MCV RBC AUTO: 88.1 FL (ref 82.6–102.9)
MONOCYTES # BLD: 5 % (ref 3–12)
MORPHOLOGY: ABNORMAL
NRBC AUTOMATED: 0 PER 100 WBC
PDW BLD-RTO: 25.2 % (ref 11.8–14.4)
PLATELET # BLD: 273 K/UL (ref 138–453)
PLATELET ESTIMATE: ABNORMAL
PMV BLD AUTO: 8.7 FL (ref 8.1–13.5)
POTASSIUM SERPL-SCNC: 4.3 MMOL/L (ref 3.7–5.3)
RBC # BLD: 2.86 M/UL (ref 3.95–5.11)
RBC # BLD: ABNORMAL 10*6/UL
SEG NEUTROPHILS: 74 % (ref 36–65)
SEGMENTED NEUTROPHILS ABSOLUTE COUNT: 8.88 K/UL (ref 1.5–8.1)
SODIUM BLD-SCNC: 135 MMOL/L (ref 135–144)
WBC # BLD: 12 K/UL (ref 3.5–11.3)
WBC # BLD: ABNORMAL 10*3/UL

## 2018-10-01 PROCEDURE — 6370000000 HC RX 637 (ALT 250 FOR IP): Performed by: INTERNAL MEDICINE

## 2018-10-01 PROCEDURE — 6370000000 HC RX 637 (ALT 250 FOR IP): Performed by: EMERGENCY MEDICINE

## 2018-10-01 PROCEDURE — 6360000002 HC RX W HCPCS: Performed by: INTERNAL MEDICINE

## 2018-10-01 PROCEDURE — C9113 INJ PANTOPRAZOLE SODIUM, VIA: HCPCS | Performed by: NURSE PRACTITIONER

## 2018-10-01 PROCEDURE — 6360000002 HC RX W HCPCS: Performed by: NURSE PRACTITIONER

## 2018-10-01 PROCEDURE — 6370000000 HC RX 637 (ALT 250 FOR IP): Performed by: SURGERY

## 2018-10-01 PROCEDURE — 94762 N-INVAS EAR/PLS OXIMTRY CONT: CPT

## 2018-10-01 PROCEDURE — 2580000003 HC RX 258: Performed by: NURSE PRACTITIONER

## 2018-10-01 PROCEDURE — 6370000000 HC RX 637 (ALT 250 FOR IP): Performed by: NURSE PRACTITIONER

## 2018-10-01 PROCEDURE — 6370000000 HC RX 637 (ALT 250 FOR IP): Performed by: STUDENT IN AN ORGANIZED HEALTH CARE EDUCATION/TRAINING PROGRAM

## 2018-10-01 PROCEDURE — 2500000003 HC RX 250 WO HCPCS: Performed by: STUDENT IN AN ORGANIZED HEALTH CARE EDUCATION/TRAINING PROGRAM

## 2018-10-01 PROCEDURE — 36415 COLL VENOUS BLD VENIPUNCTURE: CPT

## 2018-10-01 PROCEDURE — 2580000003 HC RX 258: Performed by: INTERNAL MEDICINE

## 2018-10-01 PROCEDURE — 2060000000 HC ICU INTERMEDIATE R&B

## 2018-10-01 PROCEDURE — 99232 SBSQ HOSP IP/OBS MODERATE 35: CPT | Performed by: INTERNAL MEDICINE

## 2018-10-01 PROCEDURE — 80048 BASIC METABOLIC PNL TOTAL CA: CPT

## 2018-10-01 PROCEDURE — 82947 ASSAY GLUCOSE BLOOD QUANT: CPT

## 2018-10-01 PROCEDURE — 85025 COMPLETE CBC W/AUTO DIFF WBC: CPT

## 2018-10-01 RX ORDER — 0.9 % SODIUM CHLORIDE 0.9 %
VIAL (ML) INJECTION
Status: DISPENSED
Start: 2018-10-01 | End: 2018-10-01

## 2018-10-01 RX ADMIN — POLYETHYLENE GLYCOL 3350 34 G: 17 POWDER, FOR SOLUTION ORAL at 10:54

## 2018-10-01 RX ADMIN — Medication 220 MG: at 10:54

## 2018-10-01 RX ADMIN — FUROSEMIDE 40 MG: 40 TABLET ORAL at 10:53

## 2018-10-01 RX ADMIN — OXYCODONE HYDROCHLORIDE 15 MG: 5 TABLET ORAL at 19:45

## 2018-10-01 RX ADMIN — LINEZOLID 600 MG: 600 TABLET, FILM COATED ORAL at 20:00

## 2018-10-01 RX ADMIN — Medication 500 MG: at 10:53

## 2018-10-01 RX ADMIN — OXYCODONE HYDROCHLORIDE 10 MG: 5 TABLET ORAL at 05:59

## 2018-10-01 RX ADMIN — CARVEDILOL 12.5 MG: 12.5 TABLET, FILM COATED ORAL at 20:00

## 2018-10-01 RX ADMIN — PANTOPRAZOLE SODIUM 40 MG: 40 INJECTION, POWDER, FOR SOLUTION INTRAVENOUS at 20:00

## 2018-10-01 RX ADMIN — SILVER SULFADIAZINE: 10 CREAM TOPICAL at 23:14

## 2018-10-01 RX ADMIN — MULTIPLE VITAMINS W/ MINERALS TAB 1 TABLET: TAB at 16:06

## 2018-10-01 RX ADMIN — PANTOPRAZOLE SODIUM 40 MG: 40 INJECTION, POWDER, FOR SOLUTION INTRAVENOUS at 10:54

## 2018-10-01 RX ADMIN — PIPERACILLIN AND TAZOBACTAM 3.38 G: 3; .375 INJECTION, POWDER, LYOPHILIZED, FOR SOLUTION INTRAVENOUS; PARENTERAL at 14:00

## 2018-10-01 RX ADMIN — LINEZOLID 600 MG: 600 TABLET, FILM COATED ORAL at 10:54

## 2018-10-01 RX ADMIN — DARBEPOETIN ALFA 60 MCG: 60 INJECTION, SOLUTION INTRAVENOUS; SUBCUTANEOUS at 23:13

## 2018-10-01 RX ADMIN — Medication 10 ML: at 10:55

## 2018-10-01 RX ADMIN — ACETAMINOPHEN 650 MG: 325 TABLET ORAL at 00:13

## 2018-10-01 RX ADMIN — PIPERACILLIN AND TAZOBACTAM 3.38 G: 3; .375 INJECTION, POWDER, LYOPHILIZED, FOR SOLUTION INTRAVENOUS; PARENTERAL at 05:17

## 2018-10-01 RX ADMIN — CARVEDILOL 12.5 MG: 12.5 TABLET, FILM COATED ORAL at 10:54

## 2018-10-01 RX ADMIN — PIPERACILLIN AND TAZOBACTAM 3.38 G: 3; .375 INJECTION, POWDER, LYOPHILIZED, FOR SOLUTION INTRAVENOUS; PARENTERAL at 23:13

## 2018-10-01 ASSESSMENT — ENCOUNTER SYMPTOMS
CONSTIPATION: 1
SHORTNESS OF BREATH: 1
VOMITING: 0
SPUTUM PRODUCTION: 0
ORTHOPNEA: 0
NAUSEA: 0
RESPIRATORY NEGATIVE: 1
ABDOMINAL PAIN: 0
GASTROINTESTINAL NEGATIVE: 1
COUGH: 0

## 2018-10-01 ASSESSMENT — PAIN SCALES - GENERAL
PAINLEVEL_OUTOF10: 8
PAINLEVEL_OUTOF10: 8
PAINLEVEL_OUTOF10: 5

## 2018-10-01 NOTE — PROGRESS NOTES
5' 3.5\" (1.613 m)   Wt (!) 302 lb 4 oz (137.1 kg)   LMP 2018 (Within Days)   SpO2 98%   BMI 52.70 kg/m²     Temperature Range: Temp: 98.6 °F (37 °C) Temp  Av.2 °F (36.8 °C)  Min: 97.7 °F (36.5 °C)  Max: 98.6 °F (37 °C)   The patient is seen and evaluated at bedside she is awake and alert still having a lot of pain in the lower extremities bilaterally  Is planned to standing that she underwent debridement of the wounds earlier today by the surgical team.  The plan is for repeat evaluation tomorrow morning. Review of Systems   Constitutional: Negative for chills and fever. Respiratory: Negative. Cardiovascular: Negative for chest pain. Gastrointestinal: Negative. Physical Examination :     Physical Exam   Constitutional: She is oriented to person, place, and time. Cardiovascular: Regular rhythm. Murmur heard. Pulmonary/Chest: Effort normal and breath sounds normal.   Abdominal: Soft. Bowel sounds are normal.   Obese abdomen   Neurological: She is alert and oriented to person, place, and time. Skin:   The ulcerations were in postoperative dressings and they were not removed                   Laboratory data:   I have independently reviewed the following labs:  CBC with Differential:   Recent Labs      18   0651  10/01/18   0524   WBC  13.2*  12.0*   HGB  7.5*  7.2*   HCT  26.3*  25.2*   PLT  314  273   LYMPHOPCT  8*  15*   MONOPCT  1  5     BMP:   Recent Labs      1818  18   0651  10/01/18   0524   NA  138  139  135   K  3.2*  3.9  4.3   CL  99  97*  98   CO2  28  29  28   BUN  23  22  23   CREATININE  1.72*  1.66*  1.96*   MG  2.2   --    --      Hepatic Function Panel:   No results for input(s): PROT, LABALBU, BILIDIR, IBILI, BILITOT, ALKPHOS, ALT, AST in the last 72 hours. No results for input(s): VANCOTROUGH in the last 72 hours.    Lab Results   Component Value Date    CRP 62.6 (H) 2018     No results found for: SEDRATE    Imaging Studies:   TWO XRAY VIEWS OF THE LEFT TIBIA AND FIBULA 9/23/2018 3:27 am   FINDINGS:   1. No fracture or dislocation identified. 2. No radiographic evidence of osteomyelitis. TWO XRAY VIEWS OF THE RIGHT TIBIA AND FIBULA 9/23/2018 3:27 am   FINDINGS:   1.  No radiographic evidence of fracture or dislocation identified. 2. Findings suggestive of moderate to severe degenerative osteoarthritis       SINGLE XRAY VIEW OF THE CHEST 9/23/2018 10:38 am   FINDINGS:  Cardiomegaly. No x-ray evidence of CHF. There appears to be \"water bottle\" shape of the heart. Need echocardiography to evaluate possible pericardial fluid should be determined clinically. RETROPERITONEAL ULTRASOUND OF THE KIDNEYS AND URINARY BLADDER 9/23/2018   FINDINGS:   1. No focal abnormality related to either kidney. 2. Sonographer questions the presence of uterine fibroids. Cultures:     Wound Culture [[id number]] (Abnormal) Collected: 09/29/18 0721   Order Status: Completed Updated: 10/01/18 0938    Specimen Description . LEG .WOUND SWAB    Special Requests NOT REPORTED    Direct Exam FEW NEUTROPHILS (A)    Direct Exam NO BACTERIA SEEN    Culture NO GROWTH    Status Pending     CULTURE BLOOD #2 [[id number]] Collected: 09/23/18 0840   Order Status: Completed Updated: 09/29/18 0411    Specimen Description . BLOOD    Special Requests LEFT ARM 3 ML    Culture NO GROWTH 6 DAYS    Status FINAL 09/29/2018   Culture Blood #1 [[id number]] Collected: 09/23/18 0350   Order Status: Completed Specimen: Blood Updated: 09/29/18 0411    Specimen Description . BLOOD    Special Requests LT FA 1ML    Culture NO GROWTH 6 DAYS    Status FINAL 09/29/2018     Culture Blood #1 [[id number]] (Abnormal) Collected: 09/23/18 0446   Order Status: Completed Specimen: Blood Updated: 09/26/18 0824    Specimen Description . BLOOD    Special Requests RT UPPER ARM 4 ML    Culture POSITIVE BLOOD CULTURE, RN NOTIFIED: Yaquelin Reed AT 1020 09/24/2018 (A)    Culture DIRECT GRAM STAIN FROM BOTTLE: Tawanna Valentin

## 2018-10-01 NOTE — PROGRESS NOTES
initiated  On 9/24 the patient underwent:  PROCEDURES:  Sharp excisional debridement of bilateral lower extremity with scalpel & forceps of ulcerations, lateral and medial, with measurements as above and application of acellular xenograft. The patient will be transfused to maintain hemoglobin greater than 7.0  Cardiology was consulted - for elevated troponins, thought to be secondary to renal insufficiency  Antibiotics were ordered / managed by ID  Sepsis has been ruled out  Her renal function was carefully monitored  Blood pressure was stabilized  Electrolyte abnormalities were addressed  The patient does report that she has stopped many of her home meds? Laxatives were needed for constipation    The patient responded to medical treatment  Wound care was to continued  She was transitioned to Zyvox by mouth, Zosyn  reordered    Past Medical History:   has a past medical history of SAM (acute kidney injury) ([facility] [address] Heart attack ([facility] [address] Morbid obesity ([facility] [address] and Skin lesions. Social History:   reports that she has quit smoking. She has quit using smokeless tobacco. She reports that she does not drink alcohol or use drugs. Family History:   Family History   Problem Relation Age of Onset    Adopted: Yes    Cancer Mother        Medications: Allergies:     Allergies   Allergen Reactions    Fruit & Vegetable Daily [Nutritional Supplements] Anaphylaxis     After ingesting nectarines    Aspirin Nausea And Vomiting       Current Meds:   Scheduled Meds:    sodium chloride (PF)        furosemide  40 mg Oral Daily    carvedilol  12.5 mg Oral BID WC    piperacillin-tazobactam  3.375 g Intravenous Q8H    polyethylene glycol  34 g Oral Daily    zinc sulfate  220 mg Oral Daily    therapeutic multivitamin-minerals  1 tablet Oral Daily    linezolid  600 mg Oral 2 times per day    darbepoetin reva-polysorbate  60 mcg Subcutaneous Weekly    vitamin C  500 mg Oral Daily    pantoprazole  40 mg Intravenous BID

## 2018-10-01 NOTE — CARE COORDINATION
Transition planning:  Plan remains for pt to return home with grand daughter with [place] [facility] and visiting physicians. Await decision on iv antibiotics. Pt has order for commode, will wait till discharge closer to have delivered to home.

## 2018-10-01 NOTE — PROGRESS NOTES
General Surgery:  Daily Progress Note               PATIENT NAME: Sanam Suarez     TODAY'S DATE: 10/1/2018, 5:55 AM    SUBJECTIVE:     Pt seen and examined at bedside. No acute events overnight. Afebrile. Pt reports that her pain is better today. Pt reports that she has been OOB up to the chair and ambulating to the bathroom. Pt reports that she has pain with movement. Pt denies any fever, chills, nausea, vomiting, chest pain, or sob. OBJECTIVE:   VITALS:  BP (!) 137/43   Pulse 71   Temp 98.2 °F (36.8 °C) (Oral)   Resp 17   Ht 5' 3.5\" (1.613 m)   Wt (!) 302 lb 4 oz (137.1 kg)   LMP 09/18/2018 (Within Days)   SpO2 98%   BMI 52.70 kg/m²      INTAKE/OUTPUT:      Intake/Output Summary (Last 24 hours) at 10/01/18 0555  Last data filed at 09/30/18 0616   Gross per 24 hour   Intake              369 ml   Output                0 ml   Net              369 ml       PHYSICAL EXAM:  General Appearance:  awake, alert, oriented, in no acute distress  HEENT:  Normocephalic, atraumatic, mucus membranes moist  Skin:  Skin color, texture, turgor normal. No rashes or lesions. Lungs:  Normal expansion. Clear to auscultation. No rales, rhonchi, or wheezing. Heart:  Heart regular rate and rhythm  Abdomen:   soft, ND, NTTP, +bowel sounds  Wounds/Incisions: ACE bandages over B/L LE's with mild strike through drainage, mild tenderness to palpation  Extremities: Extremities warm to touch, pink, with B/L edema.       Data:  CBC with Differential:    Lab Results   Component Value Date    WBC 13.2 09/30/2018    RBC 3.02 09/30/2018    HGB 7.5 09/30/2018    HCT 26.3 09/30/2018     09/30/2018    MCV 87.1 09/30/2018    MCH 24.8 09/30/2018    MCHC 28.5 09/30/2018    RDW 24.9 09/30/2018    NRBC 1 09/26/2018    LYMPHOPCT 8 09/30/2018    MONOPCT 1 09/30/2018    BASOPCT 0 09/30/2018    MONOSABS 0.13 09/30/2018    LYMPHSABS 1.06 09/30/2018    EOSABS 0.26 09/30/2018    BASOSABS 0.00 09/30/2018    DIFFTYPE NOT REPORTED 09/30/2018

## 2018-10-02 LAB
ABSOLUTE EOS #: 0.42 K/UL (ref 0–0.4)
ABSOLUTE IMMATURE GRANULOCYTE: 0.11 K/UL (ref 0–0.3)
ABSOLUTE LYMPH #: 1.79 K/UL (ref 1–4.8)
ABSOLUTE MONO #: 0.53 K/UL (ref 0.1–0.8)
ANION GAP SERPL CALCULATED.3IONS-SCNC: 12 MMOL/L (ref 9–17)
BASOPHILS # BLD: 1 % (ref 0–2)
BASOPHILS ABSOLUTE: 0.11 K/UL (ref 0–0.2)
BUN BLDV-MCNC: 24 MG/DL (ref 8–23)
BUN/CREAT BLD: ABNORMAL (ref 9–20)
CALCIUM SERPL-MCNC: 8 MG/DL (ref 8.6–10.4)
CHLORIDE BLD-SCNC: 98 MMOL/L (ref 98–107)
CO2: 27 MMOL/L (ref 20–31)
CREAT SERPL-MCNC: 1.82 MG/DL (ref 0.5–0.9)
CULTURE: NO GROWTH
DIFFERENTIAL TYPE: ABNORMAL
DIRECT EXAM: ABNORMAL
DIRECT EXAM: ABNORMAL
EOSINOPHILS RELATIVE PERCENT: 4 % (ref 1–4)
GFR AFRICAN AMERICAN: 33 ML/MIN
GFR NON-AFRICAN AMERICAN: 27 ML/MIN
GFR SERPL CREATININE-BSD FRML MDRD: ABNORMAL ML/MIN/{1.73_M2}
GFR SERPL CREATININE-BSD FRML MDRD: ABNORMAL ML/MIN/{1.73_M2}
GLUCOSE BLD-MCNC: 126 MG/DL (ref 65–105)
GLUCOSE BLD-MCNC: 228 MG/DL (ref 65–105)
GLUCOSE BLD-MCNC: 90 MG/DL (ref 70–99)
GLUCOSE BLD-MCNC: 91 MG/DL (ref 65–105)
HCT VFR BLD CALC: 26.3 % (ref 36.3–47.1)
HEMOGLOBIN: 7.2 G/DL (ref 11.9–15.1)
IMMATURE GRANULOCYTES: 1 %
LYMPHOCYTES # BLD: 17 % (ref 24–44)
Lab: ABNORMAL
MCH RBC QN AUTO: 24.9 PG (ref 25.2–33.5)
MCHC RBC AUTO-ENTMCNC: 27.4 G/DL (ref 28.4–34.8)
MCV RBC AUTO: 91 FL (ref 82.6–102.9)
MONOCYTES # BLD: 5 % (ref 1–7)
MORPHOLOGY: ABNORMAL
NRBC AUTOMATED: 0 PER 100 WBC
PDW BLD-RTO: 26.2 % (ref 11.8–14.4)
PLATELET # BLD: 268 K/UL (ref 138–453)
PLATELET ESTIMATE: ABNORMAL
PMV BLD AUTO: 8.9 FL (ref 8.1–13.5)
POTASSIUM SERPL-SCNC: 4 MMOL/L (ref 3.7–5.3)
RBC # BLD: 2.89 M/UL (ref 3.95–5.11)
RBC # BLD: ABNORMAL 10*6/UL
SEG NEUTROPHILS: 72 % (ref 36–66)
SEGMENTED NEUTROPHILS ABSOLUTE COUNT: 7.54 K/UL (ref 1.8–7.7)
SODIUM BLD-SCNC: 137 MMOL/L (ref 135–144)
SPECIMEN DESCRIPTION: ABNORMAL
STATUS: ABNORMAL
WBC # BLD: 10.5 K/UL (ref 3.5–11.3)
WBC # BLD: ABNORMAL 10*3/UL

## 2018-10-02 PROCEDURE — 6360000002 HC RX W HCPCS: Performed by: NURSE PRACTITIONER

## 2018-10-02 PROCEDURE — 2060000000 HC ICU INTERMEDIATE R&B

## 2018-10-02 PROCEDURE — 2580000003 HC RX 258: Performed by: NURSE PRACTITIONER

## 2018-10-02 PROCEDURE — 2500000003 HC RX 250 WO HCPCS: Performed by: SURGERY

## 2018-10-02 PROCEDURE — 6370000000 HC RX 637 (ALT 250 FOR IP): Performed by: INTERNAL MEDICINE

## 2018-10-02 PROCEDURE — C9113 INJ PANTOPRAZOLE SODIUM, VIA: HCPCS | Performed by: NURSE PRACTITIONER

## 2018-10-02 PROCEDURE — 6370000000 HC RX 637 (ALT 250 FOR IP): Performed by: EMERGENCY MEDICINE

## 2018-10-02 PROCEDURE — 2580000003 HC RX 258: Performed by: INTERNAL MEDICINE

## 2018-10-02 PROCEDURE — 85025 COMPLETE CBC W/AUTO DIFF WBC: CPT

## 2018-10-02 PROCEDURE — 36415 COLL VENOUS BLD VENIPUNCTURE: CPT

## 2018-10-02 PROCEDURE — 6370000000 HC RX 637 (ALT 250 FOR IP): Performed by: NURSE PRACTITIONER

## 2018-10-02 PROCEDURE — 99232 SBSQ HOSP IP/OBS MODERATE 35: CPT | Performed by: INTERNAL MEDICINE

## 2018-10-02 PROCEDURE — 80048 BASIC METABOLIC PNL TOTAL CA: CPT

## 2018-10-02 PROCEDURE — 82947 ASSAY GLUCOSE BLOOD QUANT: CPT

## 2018-10-02 PROCEDURE — 6370000000 HC RX 637 (ALT 250 FOR IP): Performed by: SURGERY

## 2018-10-02 PROCEDURE — 6370000000 HC RX 637 (ALT 250 FOR IP): Performed by: STUDENT IN AN ORGANIZED HEALTH CARE EDUCATION/TRAINING PROGRAM

## 2018-10-02 PROCEDURE — 6360000002 HC RX W HCPCS: Performed by: INTERNAL MEDICINE

## 2018-10-02 RX ORDER — 0.9 % SODIUM CHLORIDE 0.9 %
VIAL (ML) INJECTION
Status: DISPENSED
Start: 2018-10-02 | End: 2018-10-02

## 2018-10-02 RX ORDER — AMOXICILLIN AND CLAVULANATE POTASSIUM 875; 125 MG/1; MG/1
1 TABLET, FILM COATED ORAL EVERY 12 HOURS SCHEDULED
Status: DISCONTINUED | OUTPATIENT
Start: 2018-10-02 | End: 2018-10-05 | Stop reason: HOSPADM

## 2018-10-02 RX ADMIN — OXYCODONE HYDROCHLORIDE 15 MG: 5 TABLET ORAL at 21:34

## 2018-10-02 RX ADMIN — Medication 500 MG: at 10:35

## 2018-10-02 RX ADMIN — OXYCODONE HYDROCHLORIDE 10 MG: 5 TABLET ORAL at 17:34

## 2018-10-02 RX ADMIN — AMOXICILLIN AND CLAVULANATE POTASSIUM 1 TABLET: 875; 125 TABLET, FILM COATED ORAL at 22:06

## 2018-10-02 RX ADMIN — SILVER SULFADIAZINE: 10 CREAM TOPICAL at 21:32

## 2018-10-02 RX ADMIN — Medication 10 ML: at 11:30

## 2018-10-02 RX ADMIN — LINEZOLID 600 MG: 600 TABLET, FILM COATED ORAL at 10:35

## 2018-10-02 RX ADMIN — SILVER SULFADIAZINE: 10 CREAM TOPICAL at 10:36

## 2018-10-02 RX ADMIN — OXYCODONE HYDROCHLORIDE 15 MG: 5 TABLET ORAL at 06:51

## 2018-10-02 RX ADMIN — PIPERACILLIN AND TAZOBACTAM 3.38 G: 3; .375 INJECTION, POWDER, LYOPHILIZED, FOR SOLUTION INTRAVENOUS; PARENTERAL at 04:28

## 2018-10-02 RX ADMIN — POLYETHYLENE GLYCOL 3350 34 G: 17 POWDER, FOR SOLUTION ORAL at 10:35

## 2018-10-02 RX ADMIN — CARVEDILOL 12.5 MG: 12.5 TABLET, FILM COATED ORAL at 17:34

## 2018-10-02 RX ADMIN — OXYCODONE HYDROCHLORIDE 15 MG: 5 TABLET ORAL at 02:48

## 2018-10-02 RX ADMIN — FUROSEMIDE 40 MG: 40 TABLET ORAL at 10:35

## 2018-10-02 RX ADMIN — MULTIPLE VITAMINS W/ MINERALS TAB 1 TABLET: TAB at 10:35

## 2018-10-02 RX ADMIN — Medication 220 MG: at 10:36

## 2018-10-02 RX ADMIN — PANTOPRAZOLE SODIUM 40 MG: 40 INJECTION, POWDER, FOR SOLUTION INTRAVENOUS at 10:36

## 2018-10-02 RX ADMIN — PANTOPRAZOLE SODIUM 40 MG: 40 INJECTION, POWDER, FOR SOLUTION INTRAVENOUS at 21:31

## 2018-10-02 RX ADMIN — CARVEDILOL 12.5 MG: 12.5 TABLET, FILM COATED ORAL at 10:35

## 2018-10-02 ASSESSMENT — ENCOUNTER SYMPTOMS
SORE THROAT: 0
ORTHOPNEA: 0
SHORTNESS OF BREATH: 1
COUGH: 0
BACK PAIN: 0
SPUTUM PRODUCTION: 0
PHOTOPHOBIA: 0
RESPIRATORY NEGATIVE: 1
SHORTNESS OF BREATH: 0
CONSTIPATION: 1
BLURRED VISION: 0
NAUSEA: 0
ABDOMINAL PAIN: 0
GASTROINTESTINAL NEGATIVE: 1
VOMITING: 0

## 2018-10-02 ASSESSMENT — PAIN SCALES - GENERAL
PAINLEVEL_OUTOF10: 8
PAINLEVEL_OUTOF10: 10
PAINLEVEL_OUTOF10: 10
PAINLEVEL_OUTOF10: 9

## 2018-10-02 NOTE — PLAN OF CARE
[address] [place] [address]       Second Visit Note  For more detailed information please refer to the progress note of the day      9/27/2018    5:51 PM    Name:   Colonel Lane  MRN:     [id number]     Mando Fonder:      [de-identified]   Room:   [address] [place] [address] Day:  4  Admit Date:  9/23/2018  1:29 AM    PCP:   No primary care provider on file.   Code Status:  Full Code    Patient was seen  Remains in chair  Still has not had a significant BM  Still declining further suppository attempts or enema    POC Glucose 104    CURRENT MEDS:     [START ON 9/28/2018] polyethylene glycol (GLYCOLAX) packet 34 g Daily   zinc sulfate (ZINCATE) capsule 220 mg Daily   therapeutic multivitamin-minerals 1 tablet Daily   nicotine (NICODERM CQ) 7 MG/24HR 1 patch Daily   furosemide (LASIX) injection 80 mg Daily   linezolid (ZYVOX) tablet 600 mg 2 times per day   oxyCODONE (ROXICODONE) immediate release tablet 10 mg Q4H PRN   Or    oxyCODONE (ROXICODONE) immediate release tablet 15 mg Q4H PRN   metoprolol tartrate (LOPRESSOR) tablet 25 mg BID   bisacodyl (DULCOLAX) suppository 10 mg Daily PRN   polyethylene glycol (GLYCOLAX) packet 17 g Daily PRN   bisacodyl (DULCOLAX) suppository 10 mg Daily PRN   piperacillin-tazobactam (ZOSYN) 3.375 g in dextrose 5 % 50 mL IVPB (mini-bag) Q12H   nitroGLYCERIN (NITROSTAT) SL tablet 0.4 mg Q5 Min PRN   darbepoetin reva-polysorbate (ARANESP) injection 60 mcg Weekly   vitamin C (ASCORBIC ACID) tablet 500 mg Daily   ondansetron (ZOFRAN) injection 4 mg Q6H PRN   acetaminophen (TYLENOL) tablet 650 mg Q4H PRN   pantoprazole (PROTONIX) injection 40 mg BID   And    sodium chloride (PF) 0.9 % injection 10 mL Daily   sodium hypochlorite (DAKINS) external solution Daily   dextrose 50 % solution 25 g PRN   dextrose 50 % solution 12.5 g PRN   dextrose 5 % solution PRN   calcium gluconate 10 % injection 1 g Once   iron sucrose (VENOFER) 200 mg in sodium chloride 0.9 % 100 mL IVPB Q24H   glucose (GLUTOSE) 40 % oral
Problem: Nutrition  Goal: Optimal nutrition therapy  Outcome: Ongoing  Nutrition Problem: Inadequate oral intake  Intervention: Food and/or Nutrient Delivery: Modify current diet, Start ONS  Nutritional Goals: meet % of estimated nutrition needs with oral diet and supplements
Problem: Pain:  Goal: Control of acute pain  Control of acute pain   Outcome: Ongoing      Problem: Falls - Risk of:  Goal: Will remain free from falls  Will remain free from falls   Outcome: Ongoing    Goal: Absence of physical injury  Absence of physical injury   Outcome: Ongoing      Problem: Risk for Impaired Skin Integrity  Goal: Tissue integrity - skin and mucous membranes  Structural intactness and normal physiological function of skin and  mucous membranes.    Outcome: Ongoing
Problem: Pain:  Goal: Pain level will decrease  Pain level will decrease   Outcome: Ongoing      Problem: Falls - Risk of:  Goal: Will remain free from falls  Will remain free from falls   Outcome: Ongoing  Fall risk assessment completed as charted. Patient in bed; bed locked with bedside table and call light within reach. Patient oriented to unit, room, call light, phone, staff and care plan. Patient remains free from falls at this time, will continue to monitor hourly. Problem: Risk for Impaired Skin Integrity  Goal: Tissue integrity - skin and mucous membranes  Structural intactness and normal physiological function of skin and  mucous membranes.    Outcome: Ongoing
Problem: Pain:  Goal: Pain level will decrease  Pain level will decrease   Outcome: Ongoing    Goal: Control of acute pain  Control of acute pain   Outcome: Ongoing    Goal: Control of chronic pain  Control of chronic pain   Outcome: Ongoing      Problem: Falls - Risk of:  Goal: Will remain free from falls  Will remain free from falls   Outcome: Ongoing    Goal: Absence of physical injury  Absence of physical injury   Outcome: Ongoing      Problem: Risk for Impaired Skin Integrity  Goal: Tissue integrity - skin and mucous membranes  Structural intactness and normal physiological function of skin and  mucous membranes. Outcome: Ongoing      Problem:  Activity:  Goal: Fatigue will decrease  Fatigue will decrease   Outcome: Ongoing    Goal: Ability to tolerate increased activity will improve  Ability to tolerate increased activity will improve   Outcome: Ongoing      Problem: Cardiac:  Goal: Ability to achieve and maintain adequate cardiopulmonary perfusion will improve  Ability to achieve and maintain adequate cardiopulmonary perfusion will improve   Outcome: Ongoing
evaluation - EGD and colonoscopy to be arranged, check occult blood  Laxatives as needed for constipation - enema if needed  Anticipate placement - patient refusing skilled nursing facility  Discharge planning initiated  PT eval  Social service consult - home antibiotics / wound care  Med rec done  EDDY done  Home care orders placed  The patient's status and plan have been discussed with the patient and daughter at the bedside      IP CONSULT TO HOSPITALIST  IP CONSULT TO INFECTIOUS DISEASES  PHARMACY TO ASAF Verduzco DO  9/29/2018  6:28 PM

## 2018-10-02 NOTE — PROGRESS NOTES
[facility])    Rectal bleeding    Bilateral leg pain    Acute renal failure (HCC)    Metabolic acidosis    Hypocalcemia    Hypoalbuminemia    Heart attack ([facility] [address]    Infected skin ulcer limited to breakdown of skin (HCC)    Hyponatremia    Non-traumatic rhabdomyolysis    Iron deficiency anemia    Bandemia    Slow transit constipation    Multiple superficial wounds with infection    Severe protein-calorie malnutrition ([facility] [address]    Morbid obesity ([facility] [address]    Ambulatory dysfunction  Resolved Problems:    * No resolved hospital problems.  *      Plan:        Patient continues to be stable, she refuses to go to ECF,  Gen. surgery change her dressings today, follow the recommendations  Infectious disease recommends okay to discharge on Augmentin for 10 days,  We will ambulate the patient, follow general surgery recommendations about the timing for discharge    GI evaluation - EGD and colonoscopy to be arranged, check occult blood, will get in touch with GI  Laxatives as needed for constipation - enema if needed  Nutritional supplementation  Discharge planning initiated - patient declining placement        IP CONSULT TO HOSPITALIST  IP CONSULT TO INFECTIOUS DISEASES  PHARMACY TO DOSE VANCOMYCIN  IP CONSULT TO ASAF [address]  IP CONSULT TO NEPHROLOGY  IP CONSULT TO GI  IP CONSULT TO GENERAL SURGERY  IP CONSULT TO IV TEAM  IP CONSULT TO CARDIOLOGY  IP CONSULT TO CARDIOLOGY  IP CONSULT TO SOCIAL WORK  IP CONSULT TO IV TEAM  [facility] Jack Liao MD  10/2/2018  2:51 PM

## 2018-10-02 NOTE — PROGRESS NOTES
[facility]  Progress Note    Malik Coker  MRN: [id number]  Date: 10/2/2018    Reason for F/U :   Multiple Infected wounds  Impression :   1. Multiple bilateral lower extremity wounds with evidence of necrosis and soft tissue infection. · Status post debridement 9/24/18 and graft Acell placement  · Leukocytosis. Kaylene Fritz resolved  2. Acute blood loss anemia   · status post 4 units of PRBC transfusion  3. Morbid obesity  4. Resolved hypotension  5. Acute kidney injury  6. Contaminated blood culture    Recommendations:   · Discontinue zosyn   · Stop Linezolid 600 mg PO.   · ok for disch on po augmentin and keep sylvadene-  · pyoderma gangrenosum is a concern due to the intensity of the pain and the persistent purulence- might benefit from a skin biopsy for pathology when possible   · okj for discharge w augmentin x 10 days   · FU office in 2 weeks  · Disc w RN      Infection Control Recommendations:   Universal precautions    Discharge Planning:   Estimated Length of IV antimicrobials : no  Patient will need Midline Catheter Insertion/ PICC line Insertion: No  Patient will need: Home IV , [place],  SNF,  LTAC: CSF  Patient will need outpatient wound care: Yes    Medical Decision making / Summary of Stay:   The patient is a 77 y.o. female with significant past medical history of remote coronary artery disease and obesity. Patient has had bilateral lower extremity ulcerations for several months. The wounds gradually enlarged.  The patient's appetite became poor. She also had nausea but never vomited. She had chills but no fever. The family members urged her to come to the ER  In the emergency room she was found to have significant anemia, leukocytosis, acute renal failure and hyperkalemia. She was initially admitted to the floor and subsequently transferred to the intensive care unit after she had hypotension.     Current evaluation:10/2/2018   No fever and no chills  abd non tender and soft  Left

## 2018-10-03 LAB
ABSOLUTE EOS #: 0.88 K/UL (ref 0–0.4)
ABSOLUTE IMMATURE GRANULOCYTE: 0 K/UL (ref 0–0.3)
ABSOLUTE LYMPH #: 1.67 K/UL (ref 1–4.8)
ABSOLUTE MONO #: 0.39 K/UL (ref 0.1–0.8)
ANION GAP SERPL CALCULATED.3IONS-SCNC: 12 MMOL/L (ref 9–17)
BASOPHILS # BLD: 0 % (ref 0–2)
BASOPHILS ABSOLUTE: 0 K/UL (ref 0–0.2)
BUN BLDV-MCNC: 23 MG/DL (ref 8–23)
BUN/CREAT BLD: ABNORMAL (ref 9–20)
CALCIUM SERPL-MCNC: 8.5 MG/DL (ref 8.6–10.4)
CHLORIDE BLD-SCNC: 95 MMOL/L (ref 98–107)
CO2: 28 MMOL/L (ref 20–31)
CREAT SERPL-MCNC: 1.74 MG/DL (ref 0.5–0.9)
DIFFERENTIAL TYPE: ABNORMAL
EOSINOPHILS RELATIVE PERCENT: 9 % (ref 1–4)
GFR AFRICAN AMERICAN: 35 ML/MIN
GFR NON-AFRICAN AMERICAN: 29 ML/MIN
GFR SERPL CREATININE-BSD FRML MDRD: ABNORMAL ML/MIN/{1.73_M2}
GFR SERPL CREATININE-BSD FRML MDRD: ABNORMAL ML/MIN/{1.73_M2}
GLUCOSE BLD-MCNC: 107 MG/DL (ref 65–105)
GLUCOSE BLD-MCNC: 108 MG/DL (ref 65–105)
GLUCOSE BLD-MCNC: 97 MG/DL (ref 70–99)
HCT VFR BLD CALC: 27.5 % (ref 36.3–47.1)
HEMOGLOBIN: 8.1 G/DL (ref 11.9–15.1)
IMMATURE GRANULOCYTES: 0 %
LYMPHOCYTES # BLD: 17 % (ref 24–44)
MCH RBC QN AUTO: 25.4 PG (ref 25.2–33.5)
MCHC RBC AUTO-ENTMCNC: 29.5 G/DL (ref 28.4–34.8)
MCV RBC AUTO: 86.2 FL (ref 82.6–102.9)
MONOCYTES # BLD: 4 % (ref 1–7)
MORPHOLOGY: ABNORMAL
NRBC AUTOMATED: 0 PER 100 WBC
NUCLEATED RED BLOOD CELLS: 1 PER 100 WBC
PDW BLD-RTO: 25.9 % (ref 11.8–14.4)
PLATELET # BLD: 279 K/UL (ref 138–453)
PLATELET ESTIMATE: ABNORMAL
PMV BLD AUTO: 8.7 FL (ref 8.1–13.5)
POTASSIUM SERPL-SCNC: 4.2 MMOL/L (ref 3.7–5.3)
RBC # BLD: 3.19 M/UL (ref 3.95–5.11)
RBC # BLD: ABNORMAL 10*6/UL
SEG NEUTROPHILS: 70 % (ref 36–66)
SEGMENTED NEUTROPHILS ABSOLUTE COUNT: 6.86 K/UL (ref 1.8–7.7)
SODIUM BLD-SCNC: 135 MMOL/L (ref 135–144)
WBC # BLD: 9.8 K/UL (ref 3.5–11.3)
WBC # BLD: ABNORMAL 10*3/UL

## 2018-10-03 PROCEDURE — 2060000000 HC ICU INTERMEDIATE R&B

## 2018-10-03 PROCEDURE — 88305 TISSUE EXAM BY PATHOLOGIST: CPT

## 2018-10-03 PROCEDURE — 6370000000 HC RX 637 (ALT 250 FOR IP): Performed by: NURSE PRACTITIONER

## 2018-10-03 PROCEDURE — 99232 SBSQ HOSP IP/OBS MODERATE 35: CPT | Performed by: INTERNAL MEDICINE

## 2018-10-03 PROCEDURE — 85025 COMPLETE CBC W/AUTO DIFF WBC: CPT

## 2018-10-03 PROCEDURE — C9113 INJ PANTOPRAZOLE SODIUM, VIA: HCPCS | Performed by: NURSE PRACTITIONER

## 2018-10-03 PROCEDURE — 99232 SBSQ HOSP IP/OBS MODERATE 35: CPT | Performed by: NURSE PRACTITIONER

## 2018-10-03 PROCEDURE — 6360000002 HC RX W HCPCS: Performed by: NURSE PRACTITIONER

## 2018-10-03 PROCEDURE — 36415 COLL VENOUS BLD VENIPUNCTURE: CPT

## 2018-10-03 PROCEDURE — 6370000000 HC RX 637 (ALT 250 FOR IP): Performed by: INTERNAL MEDICINE

## 2018-10-03 PROCEDURE — 6370000000 HC RX 637 (ALT 250 FOR IP): Performed by: EMERGENCY MEDICINE

## 2018-10-03 PROCEDURE — 0HBLXZX EXCISION OF LEFT LOWER LEG SKIN, EXTERNAL APPROACH, DIAGNOSTIC: ICD-10-PCS | Performed by: SURGERY

## 2018-10-03 PROCEDURE — 6370000000 HC RX 637 (ALT 250 FOR IP): Performed by: SURGERY

## 2018-10-03 PROCEDURE — 82947 ASSAY GLUCOSE BLOOD QUANT: CPT

## 2018-10-03 PROCEDURE — 80048 BASIC METABOLIC PNL TOTAL CA: CPT

## 2018-10-03 RX ORDER — LIDOCAINE HCL/PF 50 MG/5 ML
5 SYRINGE (ML) INJECTION ONCE
Status: DISCONTINUED | OUTPATIENT
Start: 2018-10-03 | End: 2018-10-05 | Stop reason: HOSPADM

## 2018-10-03 RX ORDER — LANOLIN ALCOHOL/MO/W.PET/CERES
325 CREAM (GRAM) TOPICAL 2 TIMES DAILY WITH MEALS
Status: DISCONTINUED | OUTPATIENT
Start: 2018-10-03 | End: 2018-10-05 | Stop reason: HOSPADM

## 2018-10-03 RX ADMIN — FUROSEMIDE 40 MG: 40 TABLET ORAL at 09:38

## 2018-10-03 RX ADMIN — SILVER SULFADIAZINE: 10 CREAM TOPICAL at 11:40

## 2018-10-03 RX ADMIN — CARVEDILOL 12.5 MG: 12.5 TABLET, FILM COATED ORAL at 22:12

## 2018-10-03 RX ADMIN — CARVEDILOL 12.5 MG: 12.5 TABLET, FILM COATED ORAL at 09:38

## 2018-10-03 RX ADMIN — OXYCODONE HYDROCHLORIDE 10 MG: 5 TABLET ORAL at 11:54

## 2018-10-03 RX ADMIN — PANTOPRAZOLE SODIUM 40 MG: 40 INJECTION, POWDER, FOR SOLUTION INTRAVENOUS at 09:38

## 2018-10-03 RX ADMIN — SILVER SULFADIAZINE: 10 CREAM TOPICAL at 21:13

## 2018-10-03 RX ADMIN — FERROUS SULFATE TAB EC 325 MG (65 MG FE EQUIVALENT) 325 MG: 325 (65 FE) TABLET DELAYED RESPONSE at 11:54

## 2018-10-03 RX ADMIN — AMOXICILLIN AND CLAVULANATE POTASSIUM 1 TABLET: 875; 125 TABLET, FILM COATED ORAL at 09:38

## 2018-10-03 RX ADMIN — Medication 500 MG: at 09:38

## 2018-10-03 RX ADMIN — AMOXICILLIN AND CLAVULANATE POTASSIUM 1 TABLET: 875; 125 TABLET, FILM COATED ORAL at 21:13

## 2018-10-03 RX ADMIN — MULTIPLE VITAMINS W/ MINERALS TAB 1 TABLET: TAB at 09:38

## 2018-10-03 RX ADMIN — PANTOPRAZOLE SODIUM 40 MG: 40 INJECTION, POWDER, FOR SOLUTION INTRAVENOUS at 21:13

## 2018-10-03 RX ADMIN — FERROUS SULFATE TAB EC 325 MG (65 MG FE EQUIVALENT) 325 MG: 325 (65 FE) TABLET DELAYED RESPONSE at 21:13

## 2018-10-03 RX ADMIN — Medication 220 MG: at 09:38

## 2018-10-03 ASSESSMENT — PAIN SCALES - GENERAL
PAINLEVEL_OUTOF10: 0
PAINLEVEL_OUTOF10: 10
PAINLEVEL_OUTOF10: 0
PAINLEVEL_OUTOF10: 10

## 2018-10-03 ASSESSMENT — ENCOUNTER SYMPTOMS
COUGH: 0
VOMITING: 0
NAUSEA: 0
SPUTUM PRODUCTION: 0
ORTHOPNEA: 0
ABDOMINAL PAIN: 0

## 2018-10-03 ASSESSMENT — PAIN DESCRIPTION - FREQUENCY: FREQUENCY: INTERMITTENT

## 2018-10-03 ASSESSMENT — PAIN DESCRIPTION - LOCATION: LOCATION: GENERALIZED

## 2018-10-03 ASSESSMENT — PAIN DESCRIPTION - PAIN TYPE: TYPE: ACUTE PAIN

## 2018-10-03 NOTE — PROGRESS NOTES
10/3/2018  Pt is somnolent but easily roused  She continues to complain on nausea, denies emesis  States that the miralax she is getting is causing her nausea  She denies chills or fevers, no pain    Bilateral LE dressings are CDI    Afebrile  VSS    WBC 9.8  Hgb 8.1  Cr 1.7                            Cultures:  Blood:  · 9/23 1/3 coag neg staph - likely a contaminate   Wound:  · 9.29 No growth    Discussed with patient, RN. I have personally reviewed the past medical history, past surgical history, medications, social history, and family history, and I have updated the database accordingly.   Past Medical History:     Past Medical History:   Diagnosis Date    SAM (acute kidney injury) ([facility] [address] 9/23/2018    Heart attack ([facility] [address] 1982    Morbid obesity ([facility] [address] 9/30/2018    Skin lesions     bilateral legs       Past Surgical  History:     Past Surgical History:   Procedure Laterality Date    OR OFFICE/OUTPT VISIT,PROCEDURE ONLY Bilateral 9/24/2018    SHARP EXCISIONAL DEBRIDEMENT BILATERAL LOWER EXTREMITIES ULCERS X4 WITH SCALPEL AND FORCEPS performed by Rossana Alan DO at [address]       Medications:      ferrous sulfate  325 mg Oral BID WC    silver sulfADIAZINE   Topical BID    amoxicillin-clavulanate  1 tablet Oral 2 times per day    furosemide  40 mg Oral Daily    carvedilol  12.5 mg Oral BID WC    polyethylene glycol  34 g Oral Daily    zinc sulfate  220 mg Oral Daily    therapeutic multivitamin-minerals  1 tablet Oral Daily    darbepoetin reva-polysorbate  60 mcg Subcutaneous Weekly    vitamin C  500 mg Oral Daily    pantoprazole  40 mg Intravenous BID    And    sodium chloride (PF)  10 mL Intravenous Daily    sodium hypochlorite   Irrigation Daily    calcium gluconate  1 g Intravenous Once       Social History:     Social History     Social History    Marital status: Single     Spouse name: N/A    Number of children: N/A    Years of education: N/A     Occupational

## 2018-10-03 NOTE — PROGRESS NOTES
underwent:  PROCEDURES:  Sharp excisional debridement of bilateral lower extremity with scalpel & forceps of ulcerations, lateral and medial, with measurements as above and application of acellular xenograft. The patient will be transfused to maintain hemoglobin greater than 7.0  Cardiology was consulted - for elevated troponins, thought to be secondary to renal insufficiency  Antibiotics were ordered / managed by ID  Sepsis has been ruled out  Her renal function was carefully monitored  Blood pressure was stabilized  Electrolyte abnormalities were addressed  The patient does report that she has stopped many of her home meds? Laxatives were needed for constipation    The patient responded to medical treatment  Wound care was to continued  She was transitioned to Zyvox by mouth, Zosyn  reordered    Past Medical History:   has a past medical history of SAM (acute kidney injury) ([facility] [address] Heart attack ([facility] [address] Morbid obesity ([facility] [address] and Skin lesions. Social History:   reports that she has quit smoking. She has quit using smokeless tobacco. She reports that she does not drink alcohol or use drugs. Family History:   Family History   Problem Relation Age of Onset    Adopted: Yes    Cancer Mother        Medications: Allergies:     Allergies   Allergen Reactions    Fruit & Vegetable Daily [Nutritional Supplements] Anaphylaxis     After ingesting nectarines    Aspirin Nausea And Vomiting       Current Meds:   Scheduled Meds:    ferrous sulfate  325 mg Oral BID WC    lidocaine  5 mL Intradermal Once    silver sulfADIAZINE   Topical BID    amoxicillin-clavulanate  1 tablet Oral 2 times per day    furosemide  40 mg Oral Daily    carvedilol  12.5 mg Oral BID WC    polyethylene glycol  34 g Oral Daily    zinc sulfate  220 mg Oral Daily    therapeutic multivitamin-minerals  1 tablet Oral Daily    darbepoetin reva-polysorbate  60 mcg Subcutaneous Weekly    vitamin C  500 mg Oral Daily    pantoprazole  40 mg

## 2018-10-03 NOTE — PROCEDURES
OPERATIVE NOTE    DATE OF PROCEDURE: 10/3/2018     SURGEON: Dr. Victoria Irwin    ASSISTANT: Fransisco Acosta DO    PREOPERATIVE DIAGNOSIS: Bilateral lower extremity skin lesions    POSTOPERATIVE DIAGNOSIS: Same    OPERATION: Punch biopsy of left lower extremity anterior leg    ANESTHESIA: Local- 1% lidocaine    ESTIMATED BLOOD LOSS:  <97 mL    COMPLICATIONS: None. SPECIMENS:  Was Obtained: Sent to pathology    HISTORY: The patient is a 77y.o. year old female with history of above preop diagnosis. I explained the risk, benefits, expected outcome, and alternatives to the Patient  And they gave verbal consent. The area was prepped and draped in typical sterile fashion. The skin and subcutaneous tissue was infiltrated with 1% plain lidocaine. A 6mm punch biopsy was then made into normal tissue and infected tissue. The biopsy specimen was placed in formalin and sent to pathology. Pressure was held on the wound for 15 minutes for adequate hemostasis. The patient tolerated the procedure well.      Electronically signed by Fransisco Acosta DO on 10/3/2018 at 4:39 PM

## 2018-10-04 LAB
ABSOLUTE EOS #: 0.29 K/UL (ref 0–0.4)
ABSOLUTE IMMATURE GRANULOCYTE: 0 K/UL (ref 0–0.3)
ABSOLUTE LYMPH #: 1.46 K/UL (ref 1–4.8)
ABSOLUTE MONO #: 0.44 K/UL (ref 0.1–0.8)
ANION GAP SERPL CALCULATED.3IONS-SCNC: 9 MMOL/L (ref 9–17)
BASOPHILS # BLD: 1 % (ref 0–2)
BASOPHILS ABSOLUTE: 0.07 K/UL (ref 0–0.2)
BLOOD BANK SPECIMEN: NORMAL
BUN BLDV-MCNC: 20 MG/DL (ref 8–23)
BUN/CREAT BLD: ABNORMAL (ref 9–20)
CALCIUM SERPL-MCNC: 8.2 MG/DL (ref 8.6–10.4)
CHLORIDE BLD-SCNC: 98 MMOL/L (ref 98–107)
CO2: 27 MMOL/L (ref 20–31)
CREAT SERPL-MCNC: 1.54 MG/DL (ref 0.5–0.9)
DIFFERENTIAL TYPE: ABNORMAL
EOSINOPHILS RELATIVE PERCENT: 4 % (ref 1–4)
GFR AFRICAN AMERICAN: 40 ML/MIN
GFR NON-AFRICAN AMERICAN: 33 ML/MIN
GFR SERPL CREATININE-BSD FRML MDRD: ABNORMAL ML/MIN/{1.73_M2}
GFR SERPL CREATININE-BSD FRML MDRD: ABNORMAL ML/MIN/{1.73_M2}
GLUCOSE BLD-MCNC: 107 MG/DL (ref 65–105)
GLUCOSE BLD-MCNC: 89 MG/DL (ref 65–105)
GLUCOSE BLD-MCNC: 89 MG/DL (ref 70–99)
GLUCOSE BLD-MCNC: 94 MG/DL (ref 65–105)
HCT VFR BLD CALC: 24.3 % (ref 36.3–47.1)
HCT VFR BLD CALC: 25.6 % (ref 36.3–47.1)
HEMOGLOBIN: 7.3 G/DL (ref 11.9–15.1)
HEMOGLOBIN: 7.3 G/DL (ref 11.9–15.1)
IMMATURE GRANULOCYTES: 0 %
LYMPHOCYTES # BLD: 20 % (ref 24–44)
MAGNESIUM: 2.4 MG/DL (ref 1.6–2.6)
MCH RBC QN AUTO: 25.4 PG (ref 25.2–33.5)
MCHC RBC AUTO-ENTMCNC: 28.5 G/DL (ref 28.4–34.8)
MCV RBC AUTO: 89.2 FL (ref 82.6–102.9)
MONOCYTES # BLD: 6 % (ref 1–7)
MORPHOLOGY: ABNORMAL
NRBC AUTOMATED: 0 PER 100 WBC
PDW BLD-RTO: 26.3 % (ref 11.8–14.4)
PLATELET # BLD: 252 K/UL (ref 138–453)
PLATELET ESTIMATE: ABNORMAL
PMV BLD AUTO: 9.2 FL (ref 8.1–13.5)
POTASSIUM SERPL-SCNC: 3.8 MMOL/L (ref 3.7–5.3)
RBC # BLD: 2.87 M/UL (ref 3.95–5.11)
RBC # BLD: ABNORMAL 10*6/UL
SEG NEUTROPHILS: 69 % (ref 36–66)
SEGMENTED NEUTROPHILS ABSOLUTE COUNT: 5.04 K/UL (ref 1.8–7.7)
SODIUM BLD-SCNC: 134 MMOL/L (ref 135–144)
WBC # BLD: 7.3 K/UL (ref 3.5–11.3)
WBC # BLD: ABNORMAL 10*3/UL

## 2018-10-04 PROCEDURE — 85018 HEMOGLOBIN: CPT

## 2018-10-04 PROCEDURE — 86900 BLOOD TYPING SEROLOGIC ABO: CPT

## 2018-10-04 PROCEDURE — 6370000000 HC RX 637 (ALT 250 FOR IP): Performed by: EMERGENCY MEDICINE

## 2018-10-04 PROCEDURE — 2580000003 HC RX 258: Performed by: NURSE PRACTITIONER

## 2018-10-04 PROCEDURE — 6370000000 HC RX 637 (ALT 250 FOR IP): Performed by: INTERNAL MEDICINE

## 2018-10-04 PROCEDURE — 99232 SBSQ HOSP IP/OBS MODERATE 35: CPT | Performed by: INTERNAL MEDICINE

## 2018-10-04 PROCEDURE — 6360000002 HC RX W HCPCS: Performed by: NURSE PRACTITIONER

## 2018-10-04 PROCEDURE — 36415 COLL VENOUS BLD VENIPUNCTURE: CPT

## 2018-10-04 PROCEDURE — 6370000000 HC RX 637 (ALT 250 FOR IP): Performed by: NURSE PRACTITIONER

## 2018-10-04 PROCEDURE — C9113 INJ PANTOPRAZOLE SODIUM, VIA: HCPCS | Performed by: NURSE PRACTITIONER

## 2018-10-04 PROCEDURE — 93005 ELECTROCARDIOGRAM TRACING: CPT

## 2018-10-04 PROCEDURE — 80048 BASIC METABOLIC PNL TOTAL CA: CPT

## 2018-10-04 PROCEDURE — P9016 RBC LEUKOCYTES REDUCED: HCPCS

## 2018-10-04 PROCEDURE — 2060000000 HC ICU INTERMEDIATE R&B

## 2018-10-04 PROCEDURE — 6370000000 HC RX 637 (ALT 250 FOR IP): Performed by: SURGERY

## 2018-10-04 PROCEDURE — 85014 HEMATOCRIT: CPT

## 2018-10-04 PROCEDURE — 86920 COMPATIBILITY TEST SPIN: CPT

## 2018-10-04 PROCEDURE — 83735 ASSAY OF MAGNESIUM: CPT

## 2018-10-04 PROCEDURE — 86850 RBC ANTIBODY SCREEN: CPT

## 2018-10-04 PROCEDURE — 99233 SBSQ HOSP IP/OBS HIGH 50: CPT | Performed by: INTERNAL MEDICINE

## 2018-10-04 PROCEDURE — 86901 BLOOD TYPING SEROLOGIC RH(D): CPT

## 2018-10-04 PROCEDURE — 85025 COMPLETE CBC W/AUTO DIFF WBC: CPT

## 2018-10-04 PROCEDURE — 82947 ASSAY GLUCOSE BLOOD QUANT: CPT

## 2018-10-04 RX ORDER — 0.9 % SODIUM CHLORIDE 0.9 %
250 INTRAVENOUS SOLUTION INTRAVENOUS ONCE
Status: DISCONTINUED | OUTPATIENT
Start: 2018-10-04 | End: 2018-10-05 | Stop reason: HOSPADM

## 2018-10-04 RX ADMIN — CARVEDILOL 12.5 MG: 12.5 TABLET, FILM COATED ORAL at 19:34

## 2018-10-04 RX ADMIN — PANTOPRAZOLE SODIUM 40 MG: 40 INJECTION, POWDER, FOR SOLUTION INTRAVENOUS at 23:14

## 2018-10-04 RX ADMIN — CARVEDILOL 12.5 MG: 12.5 TABLET, FILM COATED ORAL at 11:03

## 2018-10-04 RX ADMIN — OXYCODONE HYDROCHLORIDE 15 MG: 5 TABLET ORAL at 05:58

## 2018-10-04 RX ADMIN — FERROUS SULFATE TAB EC 325 MG (65 MG FE EQUIVALENT) 325 MG: 325 (65 FE) TABLET DELAYED RESPONSE at 19:33

## 2018-10-04 RX ADMIN — PANTOPRAZOLE SODIUM 40 MG: 40 INJECTION, POWDER, FOR SOLUTION INTRAVENOUS at 11:02

## 2018-10-04 RX ADMIN — Medication 220 MG: at 11:02

## 2018-10-04 RX ADMIN — FERROUS SULFATE TAB EC 325 MG (65 MG FE EQUIVALENT) 325 MG: 325 (65 FE) TABLET DELAYED RESPONSE at 11:02

## 2018-10-04 RX ADMIN — Medication 10 ML: at 11:02

## 2018-10-04 RX ADMIN — SILVER SULFADIAZINE: 10 CREAM TOPICAL at 20:46

## 2018-10-04 RX ADMIN — Medication 500 MG: at 11:02

## 2018-10-04 RX ADMIN — FUROSEMIDE 40 MG: 40 TABLET ORAL at 11:03

## 2018-10-04 RX ADMIN — OXYCODONE HYDROCHLORIDE 10 MG: 5 TABLET ORAL at 15:58

## 2018-10-04 RX ADMIN — OXYCODONE HYDROCHLORIDE 10 MG: 5 TABLET ORAL at 21:20

## 2018-10-04 RX ADMIN — AMOXICILLIN AND CLAVULANATE POTASSIUM 1 TABLET: 875; 125 TABLET, FILM COATED ORAL at 11:03

## 2018-10-04 RX ADMIN — ONDANSETRON 4 MG: 2 INJECTION INTRAMUSCULAR; INTRAVENOUS at 16:47

## 2018-10-04 RX ADMIN — MULTIPLE VITAMINS W/ MINERALS TAB 1 TABLET: TAB at 11:03

## 2018-10-04 RX ADMIN — AMOXICILLIN AND CLAVULANATE POTASSIUM 1 TABLET: 875; 125 TABLET, FILM COATED ORAL at 21:20

## 2018-10-04 RX ADMIN — OXYCODONE HYDROCHLORIDE 15 MG: 5 TABLET ORAL at 00:52

## 2018-10-04 ASSESSMENT — ENCOUNTER SYMPTOMS
PHOTOPHOBIA: 0
SHORTNESS OF BREATH: 0
VOMITING: 0
NAUSEA: 0
SORE THROAT: 0
COUGH: 0
ABDOMINAL PAIN: 0
SPUTUM PRODUCTION: 0
BLURRED VISION: 0
BACK PAIN: 0
ORTHOPNEA: 0
GASTROINTESTINAL NEGATIVE: 1
RESPIRATORY NEGATIVE: 1

## 2018-10-04 ASSESSMENT — PAIN SCALES - GENERAL
PAINLEVEL_OUTOF10: 8
PAINLEVEL_OUTOF10: 10
PAINLEVEL_OUTOF10: 10
PAINLEVEL_OUTOF10: 9
PAINLEVEL_OUTOF10: 0

## 2018-10-04 NOTE — PROGRESS NOTES
BILITOT 0.50 09/24/2018    ALKPHOS 80 09/24/2018    AST 85 09/24/2018    ALT 18 09/24/2018       ASSESSMENT:  Active Hospital Problems    Diagnosis Date Noted    Severe protein-calorie malnutrition ([facility] [E43] 09/30/2018    Morbid obesity ([facility] [E66.01] 09/30/2018    Ambulatory dysfunction [R26.2]     Multiple superficial wounds with infection [T07. XXXA, L08.9]     Slow transit constipation [K59.01] 09/27/2018    Bandemia [D72.825]     Non-traumatic rhabdomyolysis [M62.82] 09/26/2018    Iron deficiency anemia [D50.9]     Hyponatremia [E87.1] 09/25/2018    Hypocalcemia [E83.51] 09/24/2018    Hypoalbuminemia [E88.09] 09/24/2018    Infected skin ulcer limited to breakdown of skin ([facility] [L98.491, L08.9]     Multiple and open wound of lower limb, complicated, unspecified laterality, initial encounter [S81.809A] 09/23/2018    Blood loss anemia [D50.0] 09/23/2018    Hyperkalemia [E87.5] 09/23/2018    SAM (acute kidney injury) ([facility] [N17.9] 09/23/2018    Rectal bleeding [K62.5]     Bilateral leg pain [M79.604, M79.605]     Acute renal failure ([facility] [J34.2]     Metabolic acidosis [Z12.5]     Heart attack [facility]) [I21.9] 01/01/1982       77 y.o. female  POD#10 s/p excisional debridement bilateral lower extremities with Acell placement       Plan:  1. Dressing changes daily BID with silvadene. Wash wounds with soap and water before applying silvadene   2. Continue oral augmentin, Day 2 of 10, as per ID  3. Punch biopsy of wound performed 10/03 as per ID   4. Continue medical management per primary   5. Diet as tolerated  6.  Encourage ambulation       Electronically signed by Kaleb Ball MD  on 10/4/2018 at 6:09 AM

## 2018-10-04 NOTE — PROGRESS NOTES
Pulmonary/Chest: Effort normal and breath sounds normal. No respiratory distress. She has no wheezes. She has no rales. Abdominal: Soft. Bowel sounds are normal. She exhibits no distension. There is no tenderness. There is no rebound and no guarding. Neurological: She is alert and oriented to person, place, and time. Skin: Skin is warm and dry. She is not diaphoretic. Both legs wrapped, dry, clean     Previous photos have revealed multiple wounds:                            Data:     I/O (24Hr): Intake/Output Summary (Last 24 hours) at 10/04/18 1704  Last data filed at 10/04/18 0635   Gross per 24 hour   Intake              200 ml   Output                0 ml   Net              200 ml       Labs:    Hematology:  Recent Labs      10/02/18   0619  10/03/18   0645  10/04/18   0544  10/04/18   1247   WBC  10.5  9.8  7.3   --    HGB  7.2*  8.1*  7.3*  7.3*   HCT  26.3*  27.5*  25.6*  24.3*   PLT  268  279  252   --      Chemistry:  Recent Labs      10/02/18   0619  10/03/18   0645  10/04/18   0544   NA  137  135  134*   K  4.0  4.2  3.8   CL  98  95*  98   CO2  27  28  27   GLUCOSE  90  97  89   BUN  24*  23  20   CREATININE  1.82*  1.74*  1.54*   MG   --    --   2.4   CALCIUM  8.0*  8.5*  8.2*     No results for input(s): PROT, LABALBU, LABA1C, U0TVSHU, O5DYVEH, FT4, TSH, AST, ALT, LDH, GGT, ALKPHOS, BILITOT, BILIDIR, AMMONIA, AMYLASE, LIPASE, LACTATE, CHOL, TRIG, HDL, LDLCALC, LDLDIRECT, LABVLDL, BNP, TROPONINI, CKTOTAL, CKMB, CKMBINDEX in the last 72 hours.     Lab Results   Component Value Date/Time    SPECIAL NOT REPORTED 09/29/2018 07:21 AM     Lab Results   Component Value Date/Time    CULTURE NO GROWTH 09/29/2018 07:21 AM       No results found for: POCPH, PHART, PH, POCPCO2, HUL4IEF, PCO2, POCPO2, PO2ART, PO2, POCHCO3, GZZ9CUD, HCO3, NBEA, PBEA, BEART, BE, THGBART, THB, VUL9UNE, DAAQ8QVE, P0YRUQFX, O2SAT, FIO2    Radiology:      Assessment:        Primary Problem  Principal Problem:    Multiple and

## 2018-10-04 NOTE — PROGRESS NOTES
Surgery Progress Note            PATIENT NAME: Marylene Otter     TODAY'S DATE: 10/4/2018, 6:04 AM      SUBJECTIVE:    Pt seen and examined at bedside. No acute events noted over night. Pt complains of R LE posterior wound pain states it is an 8/10. Ambulating. Pt admits to nasuea and vomiting once yesterday. Denies F/C. Passing flatus. Having BM. No other complaints noted.        OBJECTIVE:   VITALS:  BP (!) 126/33   Pulse 64   Temp 97.6 °F (36.4 °C) (Oral)   Resp 21   Ht 5' 3.5\" (1.613 m)   Wt (!) 302 lb 4 oz (137.1 kg)   LMP 09/18/2018 (Within Days)   SpO2 98%   BMI 52.70 kg/m²      INTAKE/OUTPUT:      Intake/Output Summary (Last 24 hours) at 10/04/18 0604  Last data filed at 10/03/18 1125   Gross per 24 hour   Intake              200 ml   Output                0 ml   Net              200 ml                     CONSTITUTIONAL:  NAD, A&O x 3  CARDIOVASCULAR:  Normal chest waqll motion  ABDOMEN: Soft, nondistended, appropriately TTP  Extremities: wounds clean and dry      Data:  CBC with Differential:    Lab Results   Component Value Date    WBC 9.8 10/03/2018    RBC 3.19 10/03/2018    HGB 8.1 10/03/2018    HCT 27.5 10/03/2018     10/03/2018    MCV 86.2 10/03/2018    MCH 25.4 10/03/2018    MCHC 29.5 10/03/2018    RDW 25.9 10/03/2018    NRBC 1 10/03/2018    LYMPHOPCT 17 10/03/2018    MONOPCT 4 10/03/2018    BASOPCT 0 10/03/2018    MONOSABS 0.39 10/03/2018    LYMPHSABS 1.67 10/03/2018    EOSABS 0.88 10/03/2018    BASOSABS 0.00 10/03/2018    DIFFTYPE NOT REPORTED 10/03/2018     BMP:    Lab Results   Component Value Date     10/03/2018    K 4.2 10/03/2018    CL 95 10/03/2018    CO2 28 10/03/2018    BUN 23 10/03/2018    LABALBU 2.3 09/24/2018    CREATININE 1.74 10/03/2018    CALCIUM 8.5 10/03/2018    GFRAA 35 10/03/2018    LABGLOM 29 10/03/2018    GLUCOSE 97 10/03/2018       ASSESSMENT   Patient Active Problem List   Diagnosis    Multiple and open wound of lower limb, complicated, unspecified laterality, initial encounter    Blood loss anemia    Hyperkalemia    SAM (acute kidney injury) ([address]    Rectal bleeding    Bilateral leg pain    Acute renal failure (HCC)    Metabolic acidosis    Hypocalcemia    Hypoalbuminemia    Heart attack ([address]    Infected skin ulcer limited to breakdown of skin (HCC)    Hyponatremia    Non-traumatic rhabdomyolysis    Iron deficiency anemia    Bandemia    Slow transit constipation    Multiple superficial wounds with infection    Severe protein-calorie malnutrition (HCC)    Morbid obesity ([address]    Ambulatory dysfunction       1. 77 y.o female s/p excisional debridement of Bilateral lower extremities with Acell Placement  2. Excisional wound biopsy of LLE anterior wound 10/3    Plan  1. Continue BID dressing changes with silvadene  2. Encourage high protein diet  3. [id number] [address] for discharge from surgical standpoint    Electronically signed by Carla Lake  on 10/4/2018 at 6:04 AM     Resident addendum:  Pt seen and examined. The above note was reviewed and edited and the plan is mine.     [facility],   10/04/18

## 2018-10-04 NOTE — PROGRESS NOTES
[facility]  Progress Note    Veronica Marin  MRN: [id number]  Date: 10/4/2018    Reason for F/U :   Multiple Infected wounds  Impression :   1. Multiple bilateral lower extremity wounds with evidence of necrosis and soft tissue infection. · Status post debridement 9/24/18 and graft Acell placement  · Leukocytosis. Nghia Kennaer resolved  2. Acute blood loss anemia   · status post 4 units of PRBC transfusion  3. Morbid obesity  4. Resolved hypotension  5. Acute kidney injury  6. Contaminated blood culture    Recommendations:   · Monitor off IV antibiotics  · Continue topical silvadene and dressing changes  · Office f/up in 2 weeks with Dr Rui Guillory for infection. Please call [phone] for appointment        Infection Control Recommendations:   Universal precautions    Discharge Planning:   Estimated Length of IV antimicrobials : none  Patient will need Midline Catheter Insertion/ PICC line Insertion: No  Patient will need: Home IV , [place],  SNF,  LTAC: CSF  Patient will need outpatient wound care: Yes    Medical Decision making / Summary of Stay:   INITIAL HISTORY:      The patient is a 77 y.o. female with significant past medical history of remote coronary artery disease and obesity. Patient has had bilateral lower extremity ulcerations for several months. The wounds gradually enlarged.  The patient's appetite became poor. She also had nausea but never vomited. She had chills but no fever. The family members urged her to come to the ER  In the emergency room she was found to have significant anemia, leukocytosis, acute renal failure and hyperkalemia. She was initially admitted to the floor and subsequently transferred to the intensive care unit after she had hypotension.     CURRENT EVALUATION :10/4/2018   Afebrile  VS stable    Patient feels better  Wounds improving with topical treatment with Silvadene  wound cx was neg    Leukocytosis resolved      LABS  blood culture x 1 diphteroid

## 2018-10-05 VITALS
TEMPERATURE: 98.5 F | BODY MASS INDEX: 50.02 KG/M2 | HEART RATE: 72 BPM | DIASTOLIC BLOOD PRESSURE: 46 MMHG | OXYGEN SATURATION: 99 % | SYSTOLIC BLOOD PRESSURE: 150 MMHG | HEIGHT: 64 IN | RESPIRATION RATE: 17 BRPM | WEIGHT: 293 LBS

## 2018-10-05 PROBLEM — M62.82 NON-TRAUMATIC RHABDOMYOLYSIS: Status: RESOLVED | Noted: 2018-09-26 | Resolved: 2018-10-05

## 2018-10-05 PROBLEM — E83.51 HYPOCALCEMIA: Status: RESOLVED | Noted: 2018-09-24 | Resolved: 2018-10-05

## 2018-10-05 PROBLEM — E87.5 HYPERKALEMIA: Status: RESOLVED | Noted: 2018-09-23 | Resolved: 2018-10-05

## 2018-10-05 PROBLEM — E87.1 HYPONATREMIA: Status: RESOLVED | Noted: 2018-09-25 | Resolved: 2018-10-05

## 2018-10-05 PROBLEM — K59.01 SLOW TRANSIT CONSTIPATION: Status: RESOLVED | Noted: 2018-09-27 | Resolved: 2018-10-05

## 2018-10-05 LAB
-: NORMAL
ABO/RH: NORMAL
ABSOLUTE EOS #: 0.16 K/UL (ref 0–0.4)
ABSOLUTE IMMATURE GRANULOCYTE: 0 K/UL (ref 0–0.3)
ABSOLUTE LYMPH #: 0.95 K/UL (ref 1–4.8)
ABSOLUTE MONO #: 0.32 K/UL (ref 0.1–0.8)
ANION GAP SERPL CALCULATED.3IONS-SCNC: 16 MMOL/L (ref 9–17)
ANTIBODY SCREEN: NEGATIVE
ARM BAND NUMBER: NORMAL
BASOPHILS # BLD: 0 % (ref 0–2)
BASOPHILS ABSOLUTE: 0 K/UL (ref 0–0.2)
BLD PROD TYP BPU: NORMAL
BUN BLDV-MCNC: 20 MG/DL (ref 8–23)
BUN/CREAT BLD: ABNORMAL (ref 9–20)
CALCIUM SERPL-MCNC: 8.4 MG/DL (ref 8.6–10.4)
CHLORIDE BLD-SCNC: 97 MMOL/L (ref 98–107)
CO2: 25 MMOL/L (ref 20–31)
CREAT SERPL-MCNC: 1.66 MG/DL (ref 0.5–0.9)
CROSSMATCH RESULT: NORMAL
DERMATOLOGY PATHOLOGY REPORT: NORMAL
DIFFERENTIAL TYPE: ABNORMAL
DISPENSE STATUS BLOOD BANK: NORMAL
EKG ATRIAL RATE: 58 BPM
EKG P AXIS: 52 DEGREES
EKG P-R INTERVAL: 140 MS
EKG Q-T INTERVAL: 414 MS
EKG QRS DURATION: 86 MS
EKG QTC CALCULATION (BAZETT): 406 MS
EKG R AXIS: -14 DEGREES
EKG T AXIS: 156 DEGREES
EKG VENTRICULAR RATE: 58 BPM
EOSINOPHILS RELATIVE PERCENT: 2 % (ref 1–4)
EXPIRATION DATE: NORMAL
GFR AFRICAN AMERICAN: 37 ML/MIN
GFR NON-AFRICAN AMERICAN: 31 ML/MIN
GFR SERPL CREATININE-BSD FRML MDRD: ABNORMAL ML/MIN/{1.73_M2}
GFR SERPL CREATININE-BSD FRML MDRD: ABNORMAL ML/MIN/{1.73_M2}
GLUCOSE BLD-MCNC: 106 MG/DL (ref 70–99)
GLUCOSE BLD-MCNC: 127 MG/DL (ref 65–105)
GLUCOSE BLD-MCNC: 97 MG/DL (ref 65–105)
HCT VFR BLD CALC: 29.7 % (ref 36.3–47.1)
HCT VFR BLD CALC: 31.5 % (ref 36.3–47.1)
HEMOGLOBIN: 8.9 G/DL (ref 11.9–15.1)
HEMOGLOBIN: 9.2 G/DL (ref 11.9–15.1)
IMMATURE GRANULOCYTES: 0 %
LYMPHOCYTES # BLD: 12 % (ref 24–44)
MCH RBC QN AUTO: 25.6 PG (ref 25.2–33.5)
MCHC RBC AUTO-ENTMCNC: 28.3 G/DL (ref 28.4–34.8)
MCV RBC AUTO: 90.5 FL (ref 82.6–102.9)
MONOCYTES # BLD: 4 % (ref 1–7)
MORPHOLOGY: ABNORMAL
NRBC AUTOMATED: 0 PER 100 WBC
PDW BLD-RTO: 24.8 % (ref 11.8–14.4)
PLATELET # BLD: 209 K/UL (ref 138–453)
PLATELET ESTIMATE: ABNORMAL
PMV BLD AUTO: 8.6 FL (ref 8.1–13.5)
POTASSIUM SERPL-SCNC: 3.8 MMOL/L (ref 3.7–5.3)
RBC # BLD: 3.48 M/UL (ref 3.95–5.11)
RBC # BLD: ABNORMAL 10*6/UL
REASON FOR REJECTION: NORMAL
SEG NEUTROPHILS: 82 % (ref 36–66)
SEGMENTED NEUTROPHILS ABSOLUTE COUNT: 6.47 K/UL (ref 1.8–7.7)
SODIUM BLD-SCNC: 138 MMOL/L (ref 135–144)
TRANSFUSION STATUS: NORMAL
UNIT DIVISION: 0
UNIT NUMBER: NORMAL
WBC # BLD: 7.9 K/UL (ref 3.5–11.3)
WBC # BLD: ABNORMAL 10*3/UL
ZZ NTE CLEAN UP: ORDERED TEST: NORMAL
ZZ NTE WITH NAME CLEAN UP: SPECIMEN SOURCE: NORMAL

## 2018-10-05 PROCEDURE — 6370000000 HC RX 637 (ALT 250 FOR IP): Performed by: INTERNAL MEDICINE

## 2018-10-05 PROCEDURE — 99239 HOSP IP/OBS DSCHRG MGMT >30: CPT | Performed by: INTERNAL MEDICINE

## 2018-10-05 PROCEDURE — 6370000000 HC RX 637 (ALT 250 FOR IP): Performed by: NURSE PRACTITIONER

## 2018-10-05 PROCEDURE — 2580000003 HC RX 258: Performed by: NURSE PRACTITIONER

## 2018-10-05 PROCEDURE — 85014 HEMATOCRIT: CPT

## 2018-10-05 PROCEDURE — C9113 INJ PANTOPRAZOLE SODIUM, VIA: HCPCS | Performed by: NURSE PRACTITIONER

## 2018-10-05 PROCEDURE — 82947 ASSAY GLUCOSE BLOOD QUANT: CPT

## 2018-10-05 PROCEDURE — 6370000000 HC RX 637 (ALT 250 FOR IP): Performed by: SURGERY

## 2018-10-05 PROCEDURE — 85018 HEMOGLOBIN: CPT

## 2018-10-05 PROCEDURE — 85025 COMPLETE CBC W/AUTO DIFF WBC: CPT

## 2018-10-05 PROCEDURE — 80048 BASIC METABOLIC PNL TOTAL CA: CPT

## 2018-10-05 PROCEDURE — 6370000000 HC RX 637 (ALT 250 FOR IP): Performed by: EMERGENCY MEDICINE

## 2018-10-05 PROCEDURE — 6360000002 HC RX W HCPCS: Performed by: NURSE PRACTITIONER

## 2018-10-05 PROCEDURE — 36415 COLL VENOUS BLD VENIPUNCTURE: CPT

## 2018-10-05 RX ORDER — PANTOPRAZOLE SODIUM 40 MG/1
40 TABLET, DELAYED RELEASE ORAL DAILY
Qty: 30 TABLET | Refills: 3 | Status: SHIPPED | OUTPATIENT
Start: 2018-10-05

## 2018-10-05 RX ORDER — AMOXICILLIN AND CLAVULANATE POTASSIUM 875; 125 MG/1; MG/1
1 TABLET, FILM COATED ORAL EVERY 12 HOURS SCHEDULED
Qty: 14 TABLET | Refills: 0 | Status: SHIPPED | OUTPATIENT
Start: 2018-10-05 | End: 2018-10-12

## 2018-10-05 RX ORDER — CARVEDILOL 25 MG/1
25 TABLET ORAL 2 TIMES DAILY WITH MEALS
Qty: 60 TABLET | Refills: 3 | Status: SHIPPED | OUTPATIENT
Start: 2018-10-05

## 2018-10-05 RX ORDER — OXYCODONE HYDROCHLORIDE 5 MG/1
5 TABLET ORAL EVERY 8 HOURS PRN
Qty: 14 TABLET | Refills: 0 | Status: SHIPPED | OUTPATIENT
Start: 2018-10-05 | End: 2018-10-12

## 2018-10-05 RX ORDER — CARVEDILOL 25 MG/1
25 TABLET ORAL 2 TIMES DAILY WITH MEALS
Status: DISCONTINUED | OUTPATIENT
Start: 2018-10-05 | End: 2018-10-05 | Stop reason: HOSPADM

## 2018-10-05 RX ORDER — ZINC SULFATE 50(220)MG
220 CAPSULE ORAL DAILY
Qty: 30 CAPSULE | Refills: 3 | Status: ON HOLD | COMMUNITY
Start: 2018-10-06 | End: 2019-08-20

## 2018-10-05 RX ORDER — LANOLIN ALCOHOL/MO/W.PET/CERES
325 CREAM (GRAM) TOPICAL 2 TIMES DAILY WITH MEALS
Qty: 60 TABLET | Refills: 3 | Status: ON HOLD | OUTPATIENT
Start: 2018-10-05 | End: 2019-08-20

## 2018-10-05 RX ADMIN — AMOXICILLIN AND CLAVULANATE POTASSIUM 1 TABLET: 875; 125 TABLET, FILM COATED ORAL at 08:24

## 2018-10-05 RX ADMIN — PANTOPRAZOLE SODIUM 40 MG: 40 INJECTION, POWDER, FOR SOLUTION INTRAVENOUS at 08:23

## 2018-10-05 RX ADMIN — Medication 10 ML: at 08:29

## 2018-10-05 RX ADMIN — OXYCODONE HYDROCHLORIDE 10 MG: 5 TABLET ORAL at 10:51

## 2018-10-05 RX ADMIN — SILVER SULFADIAZINE: 10 CREAM TOPICAL at 10:00

## 2018-10-05 RX ADMIN — CARVEDILOL 12.5 MG: 12.5 TABLET, FILM COATED ORAL at 08:23

## 2018-10-05 ASSESSMENT — PAIN SCALES - GENERAL
PAINLEVEL_OUTOF10: 0
PAINLEVEL_OUTOF10: 10

## 2018-10-05 NOTE — DISCHARGE SUMMARY
SYSTEM PROVIDED HISTORY: CHF TECHNOLOGIST PROVIDED HISTORY: CHF FINDINGS: Lungs: Clear Mediastinum: Cardiomegaly. Pleura: No pleural effusion or pneumothorax Other: Unremarkable. Cardiomegaly. No x-ray evidence of CHF. There appears to be \"water bottle\" shape of the heart. Need echocardiography to evaluate possible pericardial fluid should be determined clinically. Us Retroperitoneal Complete    Result Date: 9/23/2018  EXAMINATION: RETROPERITONEAL ULTRASOUND OF THE KIDNEYS AND URINARY BLADDER 9/23/2018 COMPARISON: None HISTORY: ORDERING SYSTEM PROVIDED HISTORY: RENAL FAILURE, ACUTE (KIDNEY INJURY) TECHNOLOGIST PROVIDED HISTORY: US RETROPERITONEAL COMPLETE FINDINGS: Kidneys: The right kidney measures 11.5 x 4.8 x 4.8 cm in length and the left kidney measures there is 9.2 x 4.6 x 6 cm in length. Superior pole right renal cyst measures up to 6.3 cm. It appears simple in Hounsfield attenuation. Bladder: Keyes catheter within the urinary bladder. 1. No focal abnormality related to either kidney. 2. Sonographer questions the presence of uterine fibroids. Consultations:    Consults:     Final Specialist Recommendations/Findings:   IP CONSULT TO HOSPITALIST  IP CONSULT TO INFECTIOUS DISEASES  PHARMACY TO DOSE VANCOMYCIN  IP CONSULT TO VASCULAR SURGERY  IP CONSULT TO NEPHROLOGY  IP CONSULT TO GI  IP CONSULT TO GENERAL SURGERY  IP CONSULT TO IV TEAM  IP CONSULT TO CARDIOLOGY  IP CONSULT TO CARDIOLOGY  IP CONSULT TO SOCIAL WORK  IP CONSULT TO IV TEAM  IP CONSULT TO HOME CARE NEEDS  IP CONSULT TO GI  IP CONSULT TO CARDIOLOGY      The patient was seen and examined on day of discharge and this discharge summary is in conjunction with any daily progress note from day of discharge.     Discharge plan:     Disposition: Home with [facility]    Physician Follow Up:     Francoise Esquivel MD  [address] [place] [address]    In 2 weeks      Caitlin Prakash MD  [address] [place] [address]

## 2018-10-05 NOTE — CARE COORDINATION
Discharge [facility] Case Management Department  Written by: All Bender RN    Patient Name: Celeste Munguia  Attending Provider: Kelly Miller MD  Admit Date: 2018  1:29 AM  MRN: [id number]  Account: [de-identified]                     : 1950  Discharge Date:       Disposition: home with [facility] home care, called and notified of discharge today, also notified that I called and faxed information to [facility] for visiting physician.    Faxed order for commode to [facility] to deliver to home     All Bender RN

## 2018-10-05 NOTE — PROGRESS NOTES
General Surgery:  Daily Progress Note          POD # 11          PATIENT NAME: Jenniffer Maravilla     TODAY'S DATE: 10/5/2018, 5:45 AM    SUBJECTIVE:     Pt seen and examined at bedside. No acute events overnight. Afebrile. Pt ambulates to chair and bathroom. No complaints this morning. Pain is improving in legs. Tolerating diet. Yesterday, pt Hb dropped to 7.3 and had episode of vtach. Medical team transfused 1 unit. Hb now 9.2. Pt asymptomatic today. OBJECTIVE:   VITALS:  BP (!) 143/46   Pulse 69   Temp 97.6 °F (36.4 °C) (Oral)   Resp 17   Ht 5' 3.5\" (1.613 m)   Wt (!) 316 lb (143.3 kg)   LMP 09/18/2018 (Within Days)   SpO2 99%   BMI 55.10 kg/m²      INTAKE/OUTPUT:      Intake/Output Summary (Last 24 hours) at 10/05/18 0545  Last data filed at 10/04/18 1896   Gross per 24 hour   Intake              200 ml   Output                0 ml   Net              200 ml       PHYSICAL EXAM:  General Appearance:  awake, alert, oriented, in no acute distress  HEENT:  Normocephalic, atraumatic, mucus membranes moist   Skin:  Skin color, texture, turgor normal. No rashes or lesions. Lungs:  Normal expansion. Clear to auscultation. No rales, rhonchi, or wheezing. Heart:  Heart sounds are normal.  Regular rate and rhythm without murmur, gallop or rub. Abdomen:   soft, ND, NTTP, +bowel sounds  Wounds/Incisions: healing well, active bleeding still present but decreased, no drainage, induration, or edema noted. Extremities: Extremities warm to touch, pink, with no edema.         Data:  CBC with Differential:    Lab Results   Component Value Date    WBC 7.3 10/04/2018    RBC 2.87 10/04/2018    HGB 9.2 10/05/2018    HCT 29.7 10/05/2018     10/04/2018    MCV 89.2 10/04/2018    MCH 25.4 10/04/2018    MCHC 28.5 10/04/2018    RDW 26.3 10/04/2018    NRBC 1 10/03/2018    LYMPHOPCT 20 10/04/2018    MONOPCT 6 10/04/2018    BASOPCT 1 10/04/2018    MONOSABS 0.44 10/04/2018    LYMPHSABS 1.46 10/04/2018    EOSABS for discharge from a surgical standpoint      Electronically signed by Amado Beavers MD  on 10/5/2018 at 5:45 AM

## 2018-11-06 ENCOUNTER — TELEPHONE (OUTPATIENT)
Dept: GASTROENTEROLOGY | Age: 68
End: 2018-11-06

## 2018-11-06 NOTE — TELEPHONE ENCOUNTER
Rescheduled appointment due to provider from 11/26/18 to 11/19/18 at [address].   Confirmed with Cloteal Barley

## 2018-11-19 ENCOUNTER — OFFICE VISIT (OUTPATIENT)
Dept: GASTROENTEROLOGY | Age: 68
End: 2018-11-19
Payer: MEDICARE

## 2018-11-19 VITALS
HEART RATE: 86 BPM | DIASTOLIC BLOOD PRESSURE: 68 MMHG | BODY MASS INDEX: 55.1 KG/M2 | SYSTOLIC BLOOD PRESSURE: 168 MMHG | WEIGHT: 293 LBS

## 2018-11-19 DIAGNOSIS — R10.9 ABDOMINAL PAIN, UNSPECIFIED ABDOMINAL LOCATION: ICD-10-CM

## 2018-11-19 DIAGNOSIS — D50.9 IRON DEFICIENCY ANEMIA, UNSPECIFIED IRON DEFICIENCY ANEMIA TYPE: Primary | ICD-10-CM

## 2018-11-19 PROCEDURE — 99214 OFFICE O/P EST MOD 30 MIN: CPT | Performed by: INTERNAL MEDICINE

## 2018-11-19 ASSESSMENT — ENCOUNTER SYMPTOMS
NAUSEA: 1
BLOOD IN STOOL: 0
SORE THROAT: 1
RECTAL PAIN: 0
ABDOMINAL DISTENTION: 1
ANAL BLEEDING: 0
TROUBLE SWALLOWING: 1
CHEST TIGHTNESS: 0
ABDOMINAL PAIN: 1
BACK PAIN: 1
SHORTNESS OF BREATH: 1
DIARRHEA: 0
COUGH: 0
CHOKING: 0
SINUS PAIN: 1
VOMITING: 0
CONSTIPATION: 0
SINUS PRESSURE: 1

## 2019-05-16 ENCOUNTER — TELEPHONE (OUTPATIENT)
Dept: GASTROENTEROLOGY | Age: 69
End: 2019-05-16

## 2019-05-17 ENCOUNTER — TELEPHONE (OUTPATIENT)
Dept: GASTROENTEROLOGY | Age: 69
End: 2019-05-17

## 2019-05-17 NOTE — TELEPHONE ENCOUNTER
LVM for Bekah to reschedule office visit on 05/20/19 with Dr Nawaf Galvan at [facility]. Clinic changing from afternoon to morning.

## 2019-08-19 ENCOUNTER — APPOINTMENT (OUTPATIENT)
Dept: GENERAL RADIOLOGY | Age: 69
DRG: 744 | End: 2019-08-19
Payer: MEDICARE

## 2019-08-19 ENCOUNTER — HOSPITAL ENCOUNTER (INPATIENT)
Age: 69
LOS: 5 days | Discharge: HOME | DRG: 744 | End: 2019-08-27
Attending: EMERGENCY MEDICINE | Admitting: INTERNAL MEDICINE
Payer: MEDICARE

## 2019-08-19 DIAGNOSIS — S81.801A MULTIPLE OPEN WOUNDS OF LOWER EXTREMITY, RIGHT, INITIAL ENCOUNTER: Primary | ICD-10-CM

## 2019-08-19 PROBLEM — N92.0 MENORRHAGIA: Status: ACTIVE | Noted: 2019-08-19

## 2019-08-19 PROBLEM — I89.0 LYMPHEDEMA OF BOTH LOWER EXTREMITIES: Status: ACTIVE | Noted: 2019-08-19

## 2019-08-19 PROBLEM — L97.919: Status: ACTIVE | Noted: 2019-08-19

## 2019-08-19 PROBLEM — N18.9 CKD (CHRONIC KIDNEY DISEASE): Status: ACTIVE | Noted: 2019-08-19

## 2019-08-19 PROBLEM — L08.9 INFECTED WOUND: Status: ACTIVE | Noted: 2019-08-19

## 2019-08-19 PROBLEM — T14.8XXA INFECTED WOUND: Status: ACTIVE | Noted: 2019-08-19

## 2019-08-19 PROBLEM — I10 HTN (HYPERTENSION): Status: ACTIVE | Noted: 2019-08-19

## 2019-08-19 LAB
ABSOLUTE EOS #: 0.31 K/UL (ref 0–0.44)
ABSOLUTE IMMATURE GRANULOCYTE: 0 K/UL (ref 0–0.3)
ABSOLUTE LYMPH #: 1 K/UL (ref 1.1–3.7)
ABSOLUTE MONO #: 0.54 K/UL (ref 0.1–1.2)
ANION GAP SERPL CALCULATED.3IONS-SCNC: 11 MMOL/L (ref 9–17)
BASOPHILS # BLD: 1 % (ref 0–2)
BASOPHILS ABSOLUTE: 0.08 K/UL (ref 0–0.2)
BUN BLDV-MCNC: 19 MG/DL (ref 8–23)
BUN/CREAT BLD: ABNORMAL (ref 9–20)
CALCIUM SERPL-MCNC: 8.5 MG/DL (ref 8.6–10.4)
CHLORIDE BLD-SCNC: 102 MMOL/L (ref 98–107)
CO2: 29 MMOL/L (ref 20–31)
CREAT SERPL-MCNC: 1.37 MG/DL (ref 0.5–0.9)
DIFFERENTIAL TYPE: ABNORMAL
EOSINOPHILS RELATIVE PERCENT: 4 % (ref 1–4)
FERRITIN: 8 UG/L (ref 13–150)
GFR AFRICAN AMERICAN: 46 ML/MIN
GFR NON-AFRICAN AMERICAN: 38 ML/MIN
GFR SERPL CREATININE-BSD FRML MDRD: ABNORMAL ML/MIN/{1.73_M2}
GFR SERPL CREATININE-BSD FRML MDRD: ABNORMAL ML/MIN/{1.73_M2}
GLUCOSE BLD-MCNC: 103 MG/DL (ref 70–99)
HCT VFR BLD CALC: 24.5 % (ref 36.3–47.1)
HEMOGLOBIN: 6.6 G/DL (ref 11.9–15.1)
IMMATURE GRANULOCYTES: 0 %
IRON SATURATION: 7 % (ref 20–55)
IRON: 22 UG/DL (ref 37–145)
LYMPHOCYTES # BLD: 13 % (ref 24–43)
MCH RBC QN AUTO: 21.2 PG (ref 25.2–33.5)
MCHC RBC AUTO-ENTMCNC: 26.9 G/DL (ref 28.4–34.8)
MCV RBC AUTO: 78.5 FL (ref 82.6–102.9)
MONOCYTES # BLD: 7 % (ref 3–12)
MORPHOLOGY: ABNORMAL
NRBC AUTOMATED: 0 PER 100 WBC
PDW BLD-RTO: 18.4 % (ref 11.8–14.4)
PLATELET # BLD: 236 K/UL (ref 138–453)
PLATELET ESTIMATE: ABNORMAL
PMV BLD AUTO: 10 FL (ref 8.1–13.5)
POTASSIUM SERPL-SCNC: 4.5 MMOL/L (ref 3.7–5.3)
RBC # BLD: 3.12 M/UL (ref 3.95–5.11)
RBC # BLD: ABNORMAL 10*6/UL
SEG NEUTROPHILS: 75 % (ref 36–65)
SEGMENTED NEUTROPHILS ABSOLUTE COUNT: 5.77 K/UL (ref 1.5–8.1)
SODIUM BLD-SCNC: 142 MMOL/L (ref 135–144)
TOTAL IRON BINDING CAPACITY: 329 UG/DL (ref 250–450)
UNSATURATED IRON BINDING CAPACITY: 307 UG/DL (ref 112–347)
WBC # BLD: 7.7 K/UL (ref 3.5–11.3)
WBC # BLD: ABNORMAL 10*3/UL

## 2019-08-19 PROCEDURE — 83550 IRON BINDING TEST: CPT

## 2019-08-19 PROCEDURE — G0378 HOSPITAL OBSERVATION PER HR: HCPCS

## 2019-08-19 PROCEDURE — 73590 X-RAY EXAM OF LOWER LEG: CPT

## 2019-08-19 PROCEDURE — 86900 BLOOD TYPING SEROLOGIC ABO: CPT

## 2019-08-19 PROCEDURE — 83540 ASSAY OF IRON: CPT

## 2019-08-19 PROCEDURE — 86850 RBC ANTIBODY SCREEN: CPT

## 2019-08-19 PROCEDURE — 1200000000 HC SEMI PRIVATE

## 2019-08-19 PROCEDURE — P9016 RBC LEUKOCYTES REDUCED: HCPCS

## 2019-08-19 PROCEDURE — 36430 TRANSFUSION BLD/BLD COMPNT: CPT

## 2019-08-19 PROCEDURE — 86901 BLOOD TYPING SEROLOGIC RH(D): CPT

## 2019-08-19 PROCEDURE — 99285 EMERGENCY DEPT VISIT HI MDM: CPT

## 2019-08-19 PROCEDURE — 85025 COMPLETE CBC W/AUTO DIFF WBC: CPT

## 2019-08-19 PROCEDURE — 86920 COMPATIBILITY TEST SPIN: CPT

## 2019-08-19 PROCEDURE — 99220 PR INITIAL OBSERVATION CARE/DAY 70 MINUTES: CPT | Performed by: INTERNAL MEDICINE

## 2019-08-19 PROCEDURE — 6360000002 HC RX W HCPCS: Performed by: STUDENT IN AN ORGANIZED HEALTH CARE EDUCATION/TRAINING PROGRAM

## 2019-08-19 PROCEDURE — 82728 ASSAY OF FERRITIN: CPT

## 2019-08-19 PROCEDURE — 80048 BASIC METABOLIC PNL TOTAL CA: CPT

## 2019-08-19 PROCEDURE — 6370000000 HC RX 637 (ALT 250 FOR IP): Performed by: STUDENT IN AN ORGANIZED HEALTH CARE EDUCATION/TRAINING PROGRAM

## 2019-08-19 RX ORDER — DOXYCYCLINE HYCLATE 100 MG
100 TABLET ORAL ONCE
Status: COMPLETED | OUTPATIENT
Start: 2019-08-19 | End: 2019-08-19

## 2019-08-19 RX ORDER — ONDANSETRON 2 MG/ML
4 INJECTION INTRAMUSCULAR; INTRAVENOUS EVERY 6 HOURS PRN
Status: DISCONTINUED | OUTPATIENT
Start: 2019-08-19 | End: 2019-08-27 | Stop reason: HOSPADM

## 2019-08-19 RX ORDER — SODIUM CHLORIDE 0.9 % (FLUSH) 0.9 %
10 SYRINGE (ML) INJECTION EVERY 12 HOURS SCHEDULED
Status: DISCONTINUED | OUTPATIENT
Start: 2019-08-19 | End: 2019-08-27 | Stop reason: HOSPADM

## 2019-08-19 RX ORDER — HYDROCODONE BITARTRATE AND ACETAMINOPHEN 5; 325 MG/1; MG/1
1 TABLET ORAL ONCE
Status: COMPLETED | OUTPATIENT
Start: 2019-08-19 | End: 2019-08-19

## 2019-08-19 RX ORDER — FUROSEMIDE 20 MG/1
40 TABLET ORAL DAILY
Status: DISCONTINUED | OUTPATIENT
Start: 2019-08-20 | End: 2019-08-23

## 2019-08-19 RX ORDER — PANTOPRAZOLE SODIUM 40 MG/1
40 TABLET, DELAYED RELEASE ORAL DAILY
Status: DISCONTINUED | OUTPATIENT
Start: 2019-08-19 | End: 2019-08-27 | Stop reason: HOSPADM

## 2019-08-19 RX ORDER — HYDRALAZINE HYDROCHLORIDE 20 MG/ML
10 INJECTION INTRAMUSCULAR; INTRAVENOUS EVERY 6 HOURS PRN
Status: DISCONTINUED | OUTPATIENT
Start: 2019-08-19 | End: 2019-08-27 | Stop reason: HOSPADM

## 2019-08-19 RX ORDER — CARVEDILOL 12.5 MG/1
25 TABLET ORAL 2 TIMES DAILY WITH MEALS
Status: DISCONTINUED | OUTPATIENT
Start: 2019-08-19 | End: 2019-08-27 | Stop reason: HOSPADM

## 2019-08-19 RX ORDER — SODIUM CHLORIDE 0.9 % (FLUSH) 0.9 %
10 SYRINGE (ML) INJECTION PRN
Status: DISCONTINUED | OUTPATIENT
Start: 2019-08-19 | End: 2019-08-27 | Stop reason: HOSPADM

## 2019-08-19 RX ORDER — DOXYCYCLINE HYCLATE 100 MG
100 TABLET ORAL EVERY 12 HOURS SCHEDULED
Status: DISCONTINUED | OUTPATIENT
Start: 2019-08-19 | End: 2019-08-21

## 2019-08-19 RX ADMIN — HYDRALAZINE HYDROCHLORIDE 10 MG: 20 INJECTION INTRAMUSCULAR; INTRAVENOUS at 23:27

## 2019-08-19 RX ADMIN — DOXYCYCLINE HYCLATE 100 MG: 100 TABLET, COATED ORAL at 13:36

## 2019-08-19 RX ADMIN — HYDROCODONE BITARTRATE AND ACETAMINOPHEN 1 TABLET: 5; 325 TABLET ORAL at 17:31

## 2019-08-19 ASSESSMENT — ENCOUNTER SYMPTOMS
RHINORRHEA: 0
PHOTOPHOBIA: 0
SHORTNESS OF BREATH: 0
STRIDOR: 0
NAUSEA: 0
SORE THROAT: 0
COUGH: 0
ABDOMINAL PAIN: 0
VOMITING: 0

## 2019-08-19 ASSESSMENT — PAIN SCALES - GENERAL
PAINLEVEL_OUTOF10: 5
PAINLEVEL_OUTOF10: 8
PAINLEVEL_OUTOF10: 10

## 2019-08-19 ASSESSMENT — PAIN DESCRIPTION - PAIN TYPE: TYPE: CHRONIC PAIN

## 2019-08-19 ASSESSMENT — PAIN DESCRIPTION - ORIENTATION: ORIENTATION: RIGHT

## 2019-08-19 ASSESSMENT — PAIN DESCRIPTION - LOCATION: LOCATION: LEG

## 2019-08-19 NOTE — ED PROVIDER NOTES
FACULTY SIGN-OUT  ADDENDUM     Care of this patient was assumed from Dr Michael Connor. The patient was seen for Hypertension and Leg Pain  . The patient's initial evaluation and plan have been discussed with the prior provider who initially evaluated the patient. Nursing Notes, Past Medical Hx, Past Surgical Hx, Social Hx, Allergies, and Family Hx were all reviewed. ED COURSE      The patient was given the following medications:  Orders Placed This Encounter   Medications    doxycycline hyclate (VIBRA-TABS) tablet 100 mg    doxycycline hyclate (VIBRA-TABS) tablet 100 mg    carvedilol (COREG) tablet 25 mg    furosemide (LASIX) tablet 40 mg    pantoprazole (PROTONIX) tablet 40 mg    HYDROcodone-acetaminophen (NORCO) 5-325 MG per tablet 1 tablet       RECENT VITALS:   Temp: 97.9 °F (36.6 °C), Pulse: 87, Resp: 20, BP: (!) 148/77    MEDICAL DECISION MAKING       Venous stasis wounds, anemia, plan for admission      Tyree Bloom M.D.   Emergency Medicine Attending        Noel Rubio MD  08/19/19 6825

## 2019-08-19 NOTE — ED PROVIDER NOTES
Medical: Not on file     Non-medical: Not on file   Tobacco Use    Smoking status: Former Smoker    Smokeless tobacco: Former User   Substance and Sexual Activity    Alcohol use: No    Drug use: No    Sexual activity: Never   Lifestyle    Physical activity:     Days per week: Not on file     Minutes per session: Not on file    Stress: Not on file   Relationships    Social connections:     Talks on phone: Not on file     Gets together: Not on file     Attends Yarsanism service: Not on file     Active member of club or organization: Not on file     Attends meetings of clubs or organizations: Not on file     Relationship status: Not on file    Intimate partner violence:     Fear of current or ex partner: Not on file     Emotionally abused: Not on file     Physically abused: Not on file     Forced sexual activity: Not on file   Other Topics Concern    Not on file   Social History Narrative    Not on file       Family History   Adopted: Yes   Problem Relation Age of Onset    Cancer Mother        Allergies:  Fruit & vegetable daily [nutritional supplements] and Aspirin    Home Medications:  Prior to Admission medications    Medication Sig Start Date End Date Taking?  Authorizing Provider   carvedilol (COREG) 25 MG tablet Take 1 tablet by mouth 2 times daily (with meals) 10/5/18  Yes Kadi Early MD   ferrous sulfate 325 (65 Fe) MG EC tablet Take 1 tablet by mouth 2 times daily (with meals) 10/5/18  Yes Kadi Early MD   zinc sulfate (ZINCATE) 220 (50 Zn) MG capsule Take 1 capsule by mouth daily 10/6/18  Yes Kadi Early MD   pantoprazole (PROTONIX) 40 MG tablet Take 1 tablet by mouth daily 10/5/18  Yes Kadi Early MD   furosemide (LASIX) 40 MG tablet Take 1 tablet by mouth daily 10/1/18  Yes Diamond Mae DO   Ascorbic Acid (VITAMIN C) 500 MG tablet Take 1 tablet by mouth daily 5/11/15  Yes Kaitlin Bocanegra MD   gabapentin (NEURONTIN) 300 MG capsule Take 1 capsule by mouth 3 times daily Take 300mg po increased in warmth and mildly erythematous. There is no purulent drainage.        DIFFERENTIAL  DIAGNOSIS     PLAN (LABS / IMAGING / EKG):  Orders Placed This Encounter   Procedures    XR TIBIA FIBULA RIGHT (2 VIEWS)    CBC WITH AUTO DIFFERENTIAL    Basic Metabolic Panel    Inpatient consult to Internal Medicine    TYPE AND SCREEN    PREPARE RBC (CROSSMATCH), 1 Units       MEDICATIONS ORDERED:  Orders Placed This Encounter   Medications    doxycycline hyclate (VIBRA-TABS) tablet 100 mg       DIAGNOSTIC RESULTS / EMERGENCY DEPARTMENT COURSE / MDM     LABS:  Results for orders placed or performed during the hospital encounter of 08/19/19   CBC WITH AUTO DIFFERENTIAL   Result Value Ref Range    WBC 7.7 3.5 - 11.3 k/uL    RBC 3.12 (L) 3.95 - 5.11 m/uL    Hemoglobin 6.6 (LL) 11.9 - 15.1 g/dL    Hematocrit 24.5 (L) 36.3 - 47.1 %    MCV 78.5 (L) 82.6 - 102.9 fL    MCH 21.2 (L) 25.2 - 33.5 pg    MCHC 26.9 (L) 28.4 - 34.8 g/dL    RDW 18.4 (H) 11.8 - 14.4 %    Platelets 078 929 - 249 k/uL    MPV 10.0 8.1 - 13.5 fL    NRBC Automated 0.0 0.0 per 100 WBC    Differential Type NOT REPORTED     WBC Morphology NOT REPORTED     RBC Morphology NOT REPORTED     Platelet Estimate NOT REPORTED     Immature Granulocytes 0 0 %    Seg Neutrophils 75 (H) 36 - 65 %    Lymphocytes 13 (L) 24 - 43 %    Monocytes 7 3 - 12 %    Eosinophils % 4 1 - 4 %    Basophils 1 0 - 2 %    Absolute Immature Granulocyte 0.00 0.00 - 0.30 k/uL    Segs Absolute 5.77 1.50 - 8.10 k/uL    Absolute Lymph # 1.00 (L) 1.10 - 3.70 k/uL    Absolute Mono # 0.54 0.10 - 1.20 k/uL    Absolute Eos # 0.31 0.00 - 0.44 k/uL    Basophils # 0.08 0.00 - 0.20 k/uL    Morphology ANISOCYTOSIS PRESENT     Morphology MICROCYTOSIS PRESENT     Morphology HYPOCHROMIA PRESENT    Basic Metabolic Panel   Result Value Ref Range    Glucose 103 (H) 70 - 99 mg/dL    BUN 19 8 - 23 mg/dL    CREATININE 1.37 (H) 0.50 - 0.90 mg/dL    Bun/Cre Ratio NOT REPORTED 9 - 20    Calcium 8.5 (L) 8.6 - admit.    PROCEDURES:  None    CONSULTS:  IP CONSULT TO INTERNAL MEDICINE    CRITICAL CARE:  None    FINAL IMPRESSION      1. Multiple open wounds of lower extremity, right, initial encounter          DISPOSITION / PLAN     DISPOSITION Decision To Admit 08/19/2019 01:28:30 PM      PATIENT REFERRED TO:  No follow-up provider specified.     DISCHARGE MEDICATIONS:  New Prescriptions    No medications on file       Kwabena Abdi DO  Emergency Medicine Resident    (Please note that portions of thisnote were completed with a voice recognition program.  Efforts were made to edit the dictations but occasionally words are mis-transcribed.)         Kwabena Abdi DO  08/19/19 9810

## 2019-08-19 NOTE — PROGRESS NOTES
IM senior note     76-year-old female with history of hypertension, chronic kidney disease stage III(baseline creatinine 1.5-1.6), chronic bilateral lower extremity wounds with ulcers 2/2 venous stasis, lymphedema, history of CVA in 1990s, chronic blood loss anemia presented to the ED on 8/19/2019 after seen by her home health nurse. As per patient she started to have increasing right lower extremity pain, worsening serous discharge from wounds since 1 week. Denied any fever, chills, purulent discharge. Also denied any shortness of breath, chest pain, lightheadedness, nausea, vomiting, abdominal pain, melena. Stated that she never attained menopause, continues to have daily vaginal bleeding, worse when she is sick. As per patient their family never attain menopause until death. Not a smoker, denied any alcohol use or drug use. Followed with gastroenterology in 11/2018, plan was for elective EGD and colonoscopy in 6 months for work-up for anemia which was not done. Admitted in September 2018 for sepsis secondary to infected bilateral lower extremity wounds, anemia secondary to blood loss from wounds    Echo 9/24/18  Summary  Technically difficult study due to patients body habitus. Moderate-severe left ventricular hypertrophy. Global left ventricular systolic function is hyperdynamic. Estimated  ejection fraction is > 65 % . Significant increased velocities are seen in the LVOT region extending into  the distal IVS; Peak gradient obtained was 109 mmHg in the mid ventricle  level. This may be secondary to the significant LVH and hypercontracility of  the left ventricle. Aortic valve structure and function normal.  No significant mitral regurgitation or stenosis is seen. Mild tricuspid regurgitation.     O/E -patient is alert, oriented, bilateral lower extremity wounds clean with serous discharge, no signs of cellulitis, decreased breath sounds on both sides    Principal Problem:    Nonhealing ulcer

## 2019-08-20 ENCOUNTER — APPOINTMENT (OUTPATIENT)
Dept: ULTRASOUND IMAGING | Age: 69
DRG: 744 | End: 2019-08-20
Payer: MEDICARE

## 2019-08-20 PROBLEM — N93.9 ABNORMAL UTERINE BLEEDING: Status: ACTIVE | Noted: 2019-08-20

## 2019-08-20 PROBLEM — L97.901: Status: ACTIVE | Noted: 2019-08-20

## 2019-08-20 LAB
-: ABNORMAL
ABSOLUTE EOS #: 0.39 K/UL (ref 0–0.4)
ABSOLUTE IMMATURE GRANULOCYTE: 0.1 K/UL (ref 0–0.3)
ABSOLUTE LYMPH #: 0.97 K/UL (ref 1–4.8)
ABSOLUTE MONO #: 0.58 K/UL (ref 0.1–0.8)
AMORPHOUS: ABNORMAL
ANION GAP SERPL CALCULATED.3IONS-SCNC: 14 MMOL/L (ref 9–17)
BACTERIA: ABNORMAL
BASOPHILS # BLD: 1 % (ref 0–2)
BASOPHILS ABSOLUTE: 0.1 K/UL (ref 0–0.2)
BILIRUBIN URINE: NEGATIVE
BUN BLDV-MCNC: 17 MG/DL (ref 8–23)
BUN/CREAT BLD: ABNORMAL (ref 9–20)
CALCIUM SERPL-MCNC: 8.8 MG/DL (ref 8.6–10.4)
CASTS UA: ABNORMAL /LPF (ref 0–8)
CHLORIDE BLD-SCNC: 103 MMOL/L (ref 98–107)
CO2: 24 MMOL/L (ref 20–31)
COLOR: ABNORMAL
CREAT SERPL-MCNC: 1.23 MG/DL (ref 0.5–0.9)
CRYSTALS, UA: ABNORMAL /HPF
DIFFERENTIAL TYPE: ABNORMAL
DIRECT EXAM: NORMAL
EKG ATRIAL RATE: 84 BPM
EKG P AXIS: 56 DEGREES
EKG P-R INTERVAL: 158 MS
EKG Q-T INTERVAL: 378 MS
EKG QRS DURATION: 84 MS
EKG QTC CALCULATION (BAZETT): 446 MS
EKG R AXIS: -10 DEGREES
EKG T AXIS: 155 DEGREES
EKG VENTRICULAR RATE: 84 BPM
EOSINOPHILS RELATIVE PERCENT: 4 % (ref 1–4)
EPITHELIAL CELLS UA: ABNORMAL /HPF (ref 0–5)
GFR AFRICAN AMERICAN: 52 ML/MIN
GFR NON-AFRICAN AMERICAN: 43 ML/MIN
GFR SERPL CREATININE-BSD FRML MDRD: ABNORMAL ML/MIN/{1.73_M2}
GFR SERPL CREATININE-BSD FRML MDRD: ABNORMAL ML/MIN/{1.73_M2}
GLUCOSE BLD-MCNC: 109 MG/DL (ref 70–99)
GLUCOSE URINE: NEGATIVE
HCT VFR BLD CALC: 26.7 % (ref 36.3–47.1)
HCT VFR BLD CALC: 27.3 % (ref 36.3–47.1)
HEMOGLOBIN: 7.1 G/DL (ref 11.9–15.1)
HEMOGLOBIN: 7.6 G/DL (ref 11.9–15.1)
IMMATURE GRANULOCYTES: 1 %
KETONES, URINE: NEGATIVE
LEUKOCYTE ESTERASE, URINE: ABNORMAL
LYMPHOCYTES # BLD: 10 % (ref 24–44)
Lab: NORMAL
MCH RBC QN AUTO: 20.7 PG (ref 25.2–33.5)
MCHC RBC AUTO-ENTMCNC: 27.8 G/DL (ref 28.4–34.8)
MCV RBC AUTO: 74.4 FL (ref 82.6–102.9)
MONOCYTES # BLD: 6 % (ref 1–7)
MORPHOLOGY: ABNORMAL
MORPHOLOGY: ABNORMAL
MUCUS: ABNORMAL
NITRITE, URINE: NEGATIVE
NRBC AUTOMATED: 0 PER 100 WBC
OTHER OBSERVATIONS UA: ABNORMAL
PDW BLD-RTO: 18.2 % (ref 11.8–14.4)
PH UA: 7 (ref 5–8)
PLATELET # BLD: 237 K/UL (ref 138–453)
PLATELET ESTIMATE: ABNORMAL
PMV BLD AUTO: 10.7 FL (ref 8.1–13.5)
POTASSIUM SERPL-SCNC: 4.3 MMOL/L (ref 3.7–5.3)
PROTEIN UA: ABNORMAL
RBC # BLD: 3.67 M/UL (ref 3.95–5.11)
RBC # BLD: ABNORMAL 10*6/UL
RBC UA: ABNORMAL /HPF (ref 0–4)
RENAL EPITHELIAL, UA: ABNORMAL /HPF
SEG NEUTROPHILS: 78 % (ref 36–66)
SEGMENTED NEUTROPHILS ABSOLUTE COUNT: 7.56 K/UL (ref 1.8–7.7)
SODIUM BLD-SCNC: 141 MMOL/L (ref 135–144)
SPECIFIC GRAVITY UA: 1.02 (ref 1–1.03)
SPECIMEN DESCRIPTION: NORMAL
TRICHOMONAS: ABNORMAL
TROPONIN INTERP: ABNORMAL
TROPONIN INTERP: ABNORMAL
TROPONIN T: ABNORMAL NG/ML
TROPONIN T: ABNORMAL NG/ML
TROPONIN, HIGH SENSITIVITY: 18 NG/L (ref 0–14)
TROPONIN, HIGH SENSITIVITY: 20 NG/L (ref 0–14)
TURBIDITY: ABNORMAL
URINE HGB: ABNORMAL
UROBILINOGEN, URINE: NORMAL
WBC # BLD: 9.7 K/UL (ref 3.5–11.3)
WBC # BLD: ABNORMAL 10*3/UL
WBC UA: ABNORMAL /HPF (ref 0–5)
YEAST: ABNORMAL

## 2019-08-20 PROCEDURE — 97166 OT EVAL MOD COMPLEX 45 MIN: CPT

## 2019-08-20 PROCEDURE — 85025 COMPLETE CBC W/AUTO DIFF WBC: CPT

## 2019-08-20 PROCEDURE — 80048 BASIC METABOLIC PNL TOTAL CA: CPT

## 2019-08-20 PROCEDURE — 6360000002 HC RX W HCPCS: Performed by: STUDENT IN AN ORGANIZED HEALTH CARE EDUCATION/TRAINING PROGRAM

## 2019-08-20 PROCEDURE — 87510 GARDNER VAG DNA DIR PROBE: CPT

## 2019-08-20 PROCEDURE — 99213 OFFICE O/P EST LOW 20 MIN: CPT

## 2019-08-20 PROCEDURE — 76830 TRANSVAGINAL US NON-OB: CPT

## 2019-08-20 PROCEDURE — 84484 ASSAY OF TROPONIN QUANT: CPT

## 2019-08-20 PROCEDURE — 87491 CHLMYD TRACH DNA AMP PROBE: CPT

## 2019-08-20 PROCEDURE — 6370000000 HC RX 637 (ALT 250 FOR IP): Performed by: STUDENT IN AN ORGANIZED HEALTH CARE EDUCATION/TRAINING PROGRAM

## 2019-08-20 PROCEDURE — 76856 US EXAM PELVIC COMPLETE: CPT

## 2019-08-20 PROCEDURE — 97530 THERAPEUTIC ACTIVITIES: CPT

## 2019-08-20 PROCEDURE — 2580000003 HC RX 258: Performed by: STUDENT IN AN ORGANIZED HEALTH CARE EDUCATION/TRAINING PROGRAM

## 2019-08-20 PROCEDURE — 85014 HEMATOCRIT: CPT

## 2019-08-20 PROCEDURE — 97535 SELF CARE MNGMENT TRAINING: CPT

## 2019-08-20 PROCEDURE — G0378 HOSPITAL OBSERVATION PER HR: HCPCS

## 2019-08-20 PROCEDURE — 81001 URINALYSIS AUTO W/SCOPE: CPT

## 2019-08-20 PROCEDURE — 87480 CANDIDA DNA DIR PROBE: CPT

## 2019-08-20 PROCEDURE — 93005 ELECTROCARDIOGRAM TRACING: CPT | Performed by: INTERNAL MEDICINE

## 2019-08-20 PROCEDURE — 36415 COLL VENOUS BLD VENIPUNCTURE: CPT

## 2019-08-20 PROCEDURE — 99232 SBSQ HOSP IP/OBS MODERATE 35: CPT | Performed by: INTERNAL MEDICINE

## 2019-08-20 PROCEDURE — 97163 PT EVAL HIGH COMPLEX 45 MIN: CPT

## 2019-08-20 PROCEDURE — 99226 PR SBSQ OBSERVATION CARE/DAY 35 MINUTES: CPT | Performed by: INTERNAL MEDICINE

## 2019-08-20 PROCEDURE — 51798 US URINE CAPACITY MEASURE: CPT

## 2019-08-20 PROCEDURE — 87591 N.GONORRHOEAE DNA AMP PROB: CPT

## 2019-08-20 PROCEDURE — 87660 TRICHOMONAS VAGIN DIR PROBE: CPT

## 2019-08-20 PROCEDURE — 85018 HEMOGLOBIN: CPT

## 2019-08-20 RX ORDER — AMLODIPINE BESYLATE 5 MG/1
5 TABLET ORAL DAILY
Status: DISCONTINUED | OUTPATIENT
Start: 2019-08-20 | End: 2019-08-20

## 2019-08-20 RX ORDER — ACETAMINOPHEN 325 MG/1
650 TABLET ORAL EVERY 6 HOURS PRN
Status: DISCONTINUED | OUTPATIENT
Start: 2019-08-20 | End: 2019-08-27 | Stop reason: HOSPADM

## 2019-08-20 RX ORDER — ATORVASTATIN CALCIUM 10 MG/1
10 TABLET, FILM COATED ORAL DAILY
Status: ON HOLD | COMMUNITY
End: 2020-02-28

## 2019-08-20 RX ORDER — CLONIDINE HYDROCHLORIDE 0.1 MG/1
0.1 TABLET ORAL 2 TIMES DAILY
COMMUNITY

## 2019-08-20 RX ORDER — OXYCODONE HYDROCHLORIDE 5 MG/1
5 TABLET ORAL EVERY 4 HOURS PRN
Status: DISCONTINUED | OUTPATIENT
Start: 2019-08-20 | End: 2019-08-23

## 2019-08-20 RX ORDER — AMLODIPINE BESYLATE 10 MG/1
10 TABLET ORAL DAILY
COMMUNITY

## 2019-08-20 RX ORDER — AMLODIPINE BESYLATE 10 MG/1
10 TABLET ORAL DAILY
Status: DISCONTINUED | OUTPATIENT
Start: 2019-08-21 | End: 2019-08-27 | Stop reason: HOSPADM

## 2019-08-20 RX ORDER — ACETAMINOPHEN 160 MG
TABLET,DISINTEGRATING ORAL
Status: ON HOLD | COMMUNITY
End: 2019-08-27 | Stop reason: HOSPADM

## 2019-08-20 RX ORDER — OXYCODONE HYDROCHLORIDE 5 MG/1
10 TABLET ORAL EVERY 4 HOURS PRN
Status: DISCONTINUED | OUTPATIENT
Start: 2019-08-20 | End: 2019-08-23

## 2019-08-20 RX ORDER — LEVOTHYROXINE SODIUM 0.07 MG/1
75 TABLET ORAL DAILY
Status: ON HOLD | COMMUNITY
End: 2020-02-28

## 2019-08-20 RX ORDER — MELOXICAM 7.5 MG/1
7.5 TABLET ORAL DAILY
Status: ON HOLD | COMMUNITY
End: 2019-08-27 | Stop reason: HOSPADM

## 2019-08-20 RX ORDER — HYDRALAZINE HYDROCHLORIDE 20 MG/ML
10 INJECTION INTRAMUSCULAR; INTRAVENOUS ONCE
Status: COMPLETED | OUTPATIENT
Start: 2019-08-20 | End: 2019-08-20

## 2019-08-20 RX ORDER — ALLOPURINOL 100 MG/1
100 TABLET ORAL DAILY
Status: ON HOLD | COMMUNITY
End: 2020-02-28

## 2019-08-20 RX ADMIN — Medication 10 ML: at 20:38

## 2019-08-20 RX ADMIN — IRON SUCROSE 200 MG: 20 INJECTION, SOLUTION INTRAVENOUS at 09:20

## 2019-08-20 RX ADMIN — PANTOPRAZOLE SODIUM 40 MG: 40 TABLET, DELAYED RELEASE ORAL at 09:24

## 2019-08-20 RX ADMIN — AMLODIPINE BESYLATE 5 MG: 5 TABLET ORAL at 09:20

## 2019-08-20 RX ADMIN — DOXYCYCLINE HYCLATE 100 MG: 100 TABLET, COATED ORAL at 01:41

## 2019-08-20 RX ADMIN — Medication 10 ML: at 09:21

## 2019-08-20 RX ADMIN — HYDRALAZINE HYDROCHLORIDE 10 MG: 20 INJECTION INTRAMUSCULAR; INTRAVENOUS at 03:24

## 2019-08-20 RX ADMIN — FUROSEMIDE 40 MG: 20 TABLET ORAL at 09:20

## 2019-08-20 RX ADMIN — DOXYCYCLINE HYCLATE 100 MG: 100 TABLET, COATED ORAL at 09:20

## 2019-08-20 RX ADMIN — CARVEDILOL 25 MG: 12.5 TABLET, FILM COATED ORAL at 19:10

## 2019-08-20 RX ADMIN — OXYCODONE HYDROCHLORIDE 10 MG: 5 TABLET ORAL at 15:11

## 2019-08-20 RX ADMIN — DOXYCYCLINE HYCLATE 100 MG: 100 TABLET, COATED ORAL at 20:38

## 2019-08-20 RX ADMIN — CARVEDILOL 25 MG: 12.5 TABLET, FILM COATED ORAL at 09:20

## 2019-08-20 ASSESSMENT — PAIN SCALES - GENERAL
PAINLEVEL_OUTOF10: 5
PAINLEVEL_OUTOF10: 0
PAINLEVEL_OUTOF10: 9
PAINLEVEL_OUTOF10: 10

## 2019-08-20 ASSESSMENT — ENCOUNTER SYMPTOMS
COLOR CHANGE: 1
VOMITING: 0
ABDOMINAL DISTENTION: 0
BLOOD IN STOOL: 0
SHORTNESS OF BREATH: 0
NAUSEA: 0
CONSTIPATION: 0
SINUS PAIN: 0
EYE PAIN: 0
SORE THROAT: 0
STRIDOR: 0
ABDOMINAL PAIN: 0
COUGH: 0
PHOTOPHOBIA: 0
DIARRHEA: 0
FACIAL SWELLING: 0
BACK PAIN: 0
CHOKING: 0
WHEEZING: 0
CHEST TIGHTNESS: 0
EYE REDNESS: 0

## 2019-08-20 ASSESSMENT — PAIN DESCRIPTION - PAIN TYPE
TYPE: CHRONIC PAIN
TYPE: CHRONIC PAIN

## 2019-08-20 ASSESSMENT — PAIN DESCRIPTION - DESCRIPTORS: DESCRIPTORS: BURNING;CONSTANT

## 2019-08-20 ASSESSMENT — PAIN DESCRIPTION - LOCATION
LOCATION: LEG
LOCATION: LEG

## 2019-08-20 ASSESSMENT — PAIN DESCRIPTION - ORIENTATION
ORIENTATION: RIGHT;LEFT
ORIENTATION: LEFT;RIGHT
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
## 2019-08-20 NOTE — CONSULTS
brought to the ER due to her home care nurse noticing her blood pressure to be in the 200s. Of note, her blood pressure in the ER was in the 953 systolic and her hemoglobin was 6.6. She was transfused with 1 unit of PRBC. Patient complains of menorrhagia and states that she has never attained menopause. OB/GYN was consulted and they will follow with further testing. Upon examination, patient is very tender to touch. Two wounds noted on the lateral aspect of the right leg and one wound noted on the lateral aspect of the left leg. All wounds had a fibro-granular base with serous drainage noted. Surrounding skin was  edematous but not erythematous. X ray Right Tib/Fib: diffuse ST swelling without radiopaque foreign body or soft tissue air. Afebrile  VS: BP-165/57    Labs, X rays reviewed: 8/20/2019    BUN: 17  Cr: 1.23    WBC: 9.7  Hb: 7.6  Plat: 237    Cultures:  Urine:  ·   Blood:  ·   Sputum :  ·   Wound:  · 8/20/19 Right leg      8/20/19 Left leg        Discussed with patient, RN, family. I have personally reviewed the past medical history, past surgical history, medications, social history, and family history, and I have updated the database accordingly.   Past Medical History:     Past Medical History:   Diagnosis Date    SAM (acute kidney injury) ([facility] [address] 9/23/2018    Heart attack ([facility] [address] 1982    HTN (hypertension) 8/19/2019    Morbid obesity ([facility] [address] 9/30/2018    Skin lesions     bilateral legs       Past Surgical  History:     Past Surgical History:   Procedure Laterality Date    [place] OFFICE/OUTPT VISIT,PROCEDURE ONLY Bilateral 9/24/2018    SHARP EXCISIONAL DEBRIDEMENT BILATERAL LOWER EXTREMITIES ULCERS X4 WITH SCALPEL AND FORCEPS performed by Paula Blair DO at [address]       Medications:      amLODIPine  5 mg Oral Daily    iron sucrose  200 mg Intravenous Q24H    sodium chloride flush  10 mL Intravenous 2 times per day    doxycycline hyclate  100 mg Oral 2 7.6*   HCT 24.5* 27.3*    237   LYMPHOPCT 13* PENDING   MONOPCT 7 PENDING     BMP:   Recent Labs     08/19/19  1246 08/20/19  0507    141   K 4.5 4.3    103   CO2 29 24   BUN 19 17   CREATININE 1.37* 1.23*     Hepatic Function Panel: No results for input(s): PROT, LABALBU, BILIDIR, IBILI, BILITOT, ALKPHOS, ALT, AST in the last 72 hours. No results for input(s): RPR in the last 72 hours. No results for input(s): HIV in the last 72 hours. No results for input(s): BC in the last 72 hours. Lab Results   Component Value Date    MUCUS NOT REPORTED 08/20/2019    RBC 3.67 08/20/2019    TRICHOMONAS NOT REPORTED 08/20/2019    WBC 9.7 08/20/2019    YEAST NOT REPORTED 08/20/2019    TURBIDITY CLOUDY 08/20/2019     Lab Results   Component Value Date    CREATININE 1.23 08/20/2019    GLUCOSE 109 08/20/2019       Medical Decision Making-Imaging:     EXAMINATION:   2 XRAY VIEWS OF THE RIGHT TIBIA AND FIBULA       8/19/2019 12:53 pm       COMPARISON:   None.       HISTORY:   ORDERING SYSTEM PROVIDED HISTORY: Chronic wounds r/o sub cut gas   TECHNOLOGIST PROVIDED HISTORY:   Chronic wounds r/o sub cut gas   Reason for Exam: Oozing wounds anterior/lateral leg from 10 mos. ago   Acuity: Unknown   Type of Exam: Unknown       FINDINGS:   Generalized soft tissue swelling.  No radiopaque foreign body.  No foci of   soft tissue air.  No aggressive periosteal reaction, bone lysis, or fracture.           Impression   Diffuse soft tissue swelling without radiopaque foreign body or soft tissue   air.          Medical Decision [id number]:       Medical Decision Making-Other:     Note:  · Labs, medications, radiologic studies were reviewed with personal review of films  · Moderate Large amounts of data were reviewed  · Discussed with nursing Staff, Discharge planner  · Infection Control and Prevention measures reviewed  · All prior entries were reviewed  · Administer medications as ordered  · Prognosis: Good  · Discharge planning reviewed  · Follow up as outpatient. Thank you for allowing us to participate in the care of this patient. Please call with questions. Ebony Moss, ESEQUIELM PGY1  Seen with Dr. Lan Mcdonough:    I have discussed the case, including pertinent history and exam findings with the residents. I have seen and examined the patient and the key elements of the encounter have been performed by me. I have reviewed the laboratory data, other diagnostic studies and discussed them with the residents. I have updated the medical record where necessary. I agree with the assessment, plan and orders as documented by the resident.     Saskia Bryson MD.      Pager: [phone] - Office: [phone]

## 2019-08-20 NOTE — PROGRESS NOTES
granddaughter pt performs sponge baths on main level)  Bathroom Toilet: Standard  Bathroom Equipment: Commode  Home Equipment: Cane, Rolling walker(Per pt primarily uses cane, but if pt walks >8 ft uses walker)  ADL Assistance: Needs assistance  Homemaking Assistance: Needs assistance  Homemaking Responsibilities: No  Ambulation Assistance: Needs assistance  Transfer Assistance: Needs assistance  Active : No  Patient's  Info: Granddaughter and son  Mode of Transportation: Truck, Car  Occupation: Retired  Leisure & Hobbies: Knitting; Would like to walk  Cognition   Cognition  Overall Cognitive Status: WFL    Objective  Observation/Palpation  Posture: Fair    AROM RLE (degrees)  RLE AROM: WFL  AROM LLE (degrees)  LLE AROM : WFL  AROM RUE (degrees)  RUE AROM : WFL  AROM LUE (degrees)  LUE AROM : WFL  Strength RLE  Strength RLE: Exception  R Hip Flexion: 1/5  R Knee Extension: 2-/5  R Ankle Dorsiflexion: 2-/5  R Ankle Plantar flexion: 2-/5  Strength LLE  Strength LLE: Exception  L Hip Flexion: 2-/5  L Knee Extension: 2/5  Strength RUE  Strength RUE: WFL  Strength LUE  Strength LUE: WFL     Sensation  Overall Sensation Status: WFL(Pt denies numbness/tingling at time of evaluation)  Bed mobility  Comment: Pt was sitting upright in bedside chair upon therapist arrival and exit. Transfers  Sit to Stand: Maximum Assistance;2 Person Assistance(Aide Stedy)  Stand to sit: Maximum Assistance;2 Person Assistance(Aide Stedy)  Comment: Pt completed stand pivot transfer from bedside chair <> commode Max Ax3 without AD. Pt completed STS transfer from bedside chair with RW Max Ax2 with third person for safety. PT notified RN to use Bill Pace Max Ax2 for safety. Ambulation  Ambulation?: No     Balance  Posture: Fair  Sitting - Static: Good  Standing - Static: Fair;-  Comments: Pt completed static sitting in bedside chair 5 minutes x1 SBA with Good balance.  Pt completed static standing using RW Max Ax1 with Fair- balance. Plan   Plan  Times per week: 5-6x/wk  Times per day: Daily  Current Treatment Recommendations: Strengthening, Gait Training, Balance Training, Functional Mobility Training, Endurance Training, Transfer Training, Safety Education & Training, Patient/Caregiver Education & Training  Safety Devices  Type of devices: Nurse notified, Call light within reach, Patient at risk for falls, Gait belt, Left in chair    AM-PAC Score     AM-PAC Inpatient Mobility without Stair Climbing Raw Score : 9 (08/20/19 1527)  AM-PAC Inpatient without Stair Climbing T-Scale Score : 32.44 (08/20/19 1527)  Mobility Inpatient CMS 0-100% Score: 76.07 (08/20/19 1527)  Mobility Inpatient without Stair CMS G-Code Modifier : CL (08/20/19 1527)       Goals  Short term goals  Time Frame for Short term goals: 14 visits   Short term goal 1: Pt will be able to completed bed mobility SBA. Short term goal 2: Pt will completed all transfers Min Ax1. Short term goal 3: Pt will ambulate 15 ft within the room using RW Min Ax1. Short term goal 4: Pt will be able to tolerate 45 minutes of activity in order to improve strength and endurance. Short term goal 5: Pt will improve static standing balance to Good-. Patient Goals   Patient goals : To return home; To walk       Therapy Time   Individual Concurrent Group Co-treatment   Time In 1300         Time Out 1343         Minutes 43         Timed Code Treatment Minutes: 44 [facility], This treatment/evaluation completed by signing SPT. Signing PT agrees with treatment and documentation.

## 2019-08-20 NOTE — ED PROVIDER NOTES
[facility] [facility]     Emergency Department     Faculty Attestation    I performed a history and physical examination of the patient and discussed management with the resident. I reviewed the residents note and agree with the documented findings and plan of care. Any areas of disagreement are noted on the chart. I was personally present for the key portions of any procedures. I have documented in the chart those procedures where I was not present during the key portions. I have reviewed the emergency nurses triage note. I agree with the chief complaint, past medical history, past surgical history, allergies, medications, social and family history as documented unless otherwise noted below. For Physician Assistant/ Nurse Practitioner cases/documentation I have personally evaluated this patient and have completed at least one if not all key elements of the E/M (history, physical exam, and MDM). Additional findings are as noted. I have personally seen and evaluated the patient. I find the patient's history and physical exam are consistent with the NP/PA documentation. I agree with the care provided, treatment rendered, disposition and follow-up plan. Sent in by wound care for evaluation of draining wounds on legs. Erythema immediately surrounding wounds, no spread to suggest severe cellulitis or necrotizing infection. No gas on the tissue on XR. Labs revealed anemia with hemoglobin of 6.6. 1U PRBC given. This patient was signed out to Dr. Nicole Gastelum, pending admission. Please see their note for the remainder of this patient's care.      Critical Care    Frederic Brownlee MD   Attending Emergency  Physician            Frederic Brownlee MD  08/20/19 6337

## 2019-08-20 NOTE — CARE COORDINATION
Transitional Planning    7680 Patient informed of OBS status change  LOWERY signed by grand daughter with permission of patient at bedside. Explained co pay differences and need to provide home medications for administration. Copy provided to grand daughter.

## 2019-08-20 NOTE — PROGRESS NOTES
uterine fibroids. Consult OB/GYN. Follow-up with pelvic and transvaginal ultrasound. 4. Hypertension: Uncontrolled. Continue Coreg 25 twice daily and Norvasc 10 mg daily. Continue low-salt diet. 5. CKD stage III: Stable. Continue to monitor urine output and BMP. 6. Morbid obesity  7. Bilateral lower extremity lymphedema      DVT ppx : Hold Lovenox. On EPC cuffs. GI ppx: Protonix 40 mg daily    PT/OT: PT and OT consulted  Discharge Planning / SW: Remains inpatient      Bharathi Anaya MD  Internal Medicine Resident, PGY-1  [facility]  8/20/2019, 3:01 AM      Attending Physician Statement  I have discussed the case, including pertinent history and exam findings with the resident and the team.  I have seen and examined the patient and the key elements of the encounter have been performed by me. I agree with the assessment, plan and orders as documented by the resident.         Noé Porter MD, MALATHI, [address]  Attending Physician, Internal Medicine Service    Internal Medicine Residency Program  8/20/2019, 3:57 PM

## 2019-08-20 NOTE — DISCHARGE INSTR - COC
Continuity of Care Form    Patient Name: Laverne Silva   :  1950  MRN:  [id number]    Admit date:  2019  Discharge date:  2019    Code Status Order: Full Code   Advance Directives:   [facility] Documentation     Date/Time Healthcare Directive Type of Healthcare Directive Copy in [address] Agent's Name Healthcare Agent's Phone Number    [phone]  No, patient does not have an advance directive for healthcare treatment -- -- -- -- --          Admitting Physician:  Noé Trinidad MD  PCP: Angelica MEDINA    Discharging Nurse: [facility] Unit/Room#: 4432/2124-90  Discharging Unit Phone Number: [phone]    Emergency Contact:   Extended Emergency Contact Information  Primary Emergency Contact: William Winchester[address] Phone: [phone]  Relation: Grandchild  Secondary Emergency Contact: Jareth Ye   [address] Phone: [phone]  Relation: Child    Past Surgical History:  Past Surgical History:   Procedure Laterality Date    [place] OFFICE/OUTPT VISIT,PROCEDURE ONLY Bilateral 2018    SHARP EXCISIONAL DEBRIDEMENT BILATERAL LOWER EXTREMITIES ULCERS X4 WITH SCALPEL AND FORCEPS performed by Claudette Singer, DO at [address] [place] [address]       Immunization History: There is no immunization history on file for this patient. Active Problems:  Patient Active Problem List   Diagnosis Code    Multiple and open wound of lower limb, complicated, unspecified laterality, initial encounter S81.809A    Blood loss anemia D50.0    SAM (acute kidney injury) ([address] N17.9    Bilateral leg pain M79.604, M79.605    Acute renal failure (HCC) N17.9    Hypoalbuminemia E88.09    Heart attack ([address] I21.9    Infected skin ulcer limited to breakdown of skin ([address] L98.491, L08.9    Iron deficiency anemia D50.9    Bandemia D72.825    Multiple superficial wounds with infection T07. Lily Pedro certify the above information and transfer of Harsh Landa  is necessary for the continuing treatment of the diagnosis listed and that she requires [address] for less 30 days.      Update Admission H&P: No change in H&P    PHYSICIAN SIGNATURE:  Electronically signed by Lawyer Oswaldo MD on 8/27/19 at 10:01 AM

## 2019-08-20 NOTE — PROGRESS NOTES
ADLs.    Prognosis: Fair  Decision Making: Medium Complexity  OT Education: OT Role;Plan of Care  Patient Education: OTPOC, safety, pain . Fair return  REQUIRES OT FOLLOW UP: Yes  Activity Tolerance  Activity Tolerance: Patient limited by pain; Patient limited by fatigue  Activity Tolerance: Pt reported pain throughout the legs throughout the session  Safety Devices  Safety Devices in place: Yes  Type of devices: Left in chair;Call light within reach;Nurse notified;Gait belt        Patient Diagnosis(es): The encounter diagnosis was Multiple open wounds of lower extremity, right, initial encounter. has a past medical history of SAM (acute kidney injury) ([facility] Heart attack ([facility] [address] HTN (hypertension), Morbid obesity ([facility] [address] and Skin lesions. has a past surgical history that includes Tubal ligation (1974) and pr office/outpt visit,procedure only (Bilateral, 9/24/2018).     Restrictions  Restrictions/Precautions  Restrictions/Precautions: General Precautions, Fall Risk  Required Braces or Orthoses?: No    Subjective   General  Chart Reviewed: Progress Notes, Imaging, Labs, History and Physical, Orders  Patient assessed for rehabilitation services?: Yes  Family / Caregiver Present: Yes(Granddaughter)  Pain Assessment  Pain Assessment: 0-10  Pain Level: 10  Pain Type: Chronic pain  Pain Location: Leg  Pain Orientation: Left;Right  Non-Pharmaceutical Pain Intervention(s): Ambulation/Increased Activity;Repositioned;Distraction     Social/Functional History  Social/Functional History  Lives With: Family(Granddaughter)  Type of Home: House  Home Layout: Two level, Able to Live on Main level with bedroom/bathroom(Per pt granddaughter pt sleeps in recliner on main level)  Home Access: Stairs to enter without rails  Entrance Stairs - Number of Steps: 5 - Granddaughter provides HHA up the stairs  Bathroom Shower/Tub: Tub/Shower unit(Per pt and pt's granddaughter pt performs sponge baths on main level)  Bathroom

## 2019-08-21 ENCOUNTER — ANESTHESIA EVENT (OUTPATIENT)
Dept: OPERATING ROOM | Age: 69
DRG: 744 | End: 2019-08-21
Payer: MEDICARE

## 2019-08-21 PROBLEM — D25.9 FIBROID UTERUS: Status: ACTIVE | Noted: 2019-08-21

## 2019-08-21 LAB
ABSOLUTE EOS #: 0.28 K/UL (ref 0–0.4)
ABSOLUTE IMMATURE GRANULOCYTE: 0.09 K/UL (ref 0–0.3)
ABSOLUTE LYMPH #: 0.92 K/UL (ref 1–4.8)
ABSOLUTE MONO #: 0.46 K/UL (ref 0.1–0.8)
ANION GAP SERPL CALCULATED.3IONS-SCNC: 12 MMOL/L (ref 9–17)
BASOPHILS # BLD: 1 % (ref 0–2)
BASOPHILS ABSOLUTE: 0.09 K/UL (ref 0–0.2)
BUN BLDV-MCNC: 16 MG/DL (ref 8–23)
BUN/CREAT BLD: ABNORMAL (ref 9–20)
C TRACH DNA GENITAL QL NAA+PROBE: NEGATIVE
CA 125: 14 U/ML
CALCIUM SERPL-MCNC: 8.8 MG/DL (ref 8.6–10.4)
CHLORIDE BLD-SCNC: 101 MMOL/L (ref 98–107)
CO2: 26 MMOL/L (ref 20–31)
CREAT SERPL-MCNC: 1.26 MG/DL (ref 0.5–0.9)
DIFFERENTIAL TYPE: ABNORMAL
EOSINOPHILS RELATIVE PERCENT: 3 % (ref 1–4)
ESTIMATED AVERAGE GLUCOSE: 126 MG/DL
GFR AFRICAN AMERICAN: 51 ML/MIN
GFR NON-AFRICAN AMERICAN: 42 ML/MIN
GFR SERPL CREATININE-BSD FRML MDRD: ABNORMAL ML/MIN/{1.73_M2}
GFR SERPL CREATININE-BSD FRML MDRD: ABNORMAL ML/MIN/{1.73_M2}
GLUCOSE BLD-MCNC: 96 MG/DL (ref 70–99)
HBA1C MFR BLD: 6 % (ref 4–6)
HCT VFR BLD CALC: 25.8 % (ref 36.3–47.1)
HCT VFR BLD CALC: 28.9 % (ref 36.3–47.1)
HEMOGLOBIN: 6.9 G/DL (ref 11.9–15.1)
HEMOGLOBIN: 7.7 G/DL (ref 11.9–15.1)
IMMATURE GRANULOCYTES: 1 %
INR BLD: 1
LYMPHOCYTES # BLD: 10 % (ref 24–44)
MCH RBC QN AUTO: 21.3 PG (ref 25.2–33.5)
MCHC RBC AUTO-ENTMCNC: 26.7 G/DL (ref 28.4–34.8)
MCV RBC AUTO: 79.6 FL (ref 82.6–102.9)
MONOCYTES # BLD: 5 % (ref 1–7)
MORPHOLOGY: ABNORMAL
N. GONORRHOEAE DNA: NEGATIVE
NRBC AUTOMATED: 0 PER 100 WBC
PARTIAL THROMBOPLASTIN TIME: 26.8 SEC (ref 20.5–30.5)
PDW BLD-RTO: 18.6 % (ref 11.8–14.4)
PLATELET # BLD: 239 K/UL (ref 138–453)
PLATELET ESTIMATE: ABNORMAL
PMV BLD AUTO: 10.7 FL (ref 8.1–13.5)
POTASSIUM SERPL-SCNC: 3.9 MMOL/L (ref 3.7–5.3)
PROTHROMBIN TIME: 10.7 SEC (ref 9–12)
RBC # BLD: 3.24 M/UL (ref 3.95–5.11)
RBC # BLD: ABNORMAL 10*6/UL
SEG NEUTROPHILS: 80 % (ref 36–66)
SEGMENTED NEUTROPHILS ABSOLUTE COUNT: 7.36 K/UL (ref 1.8–7.7)
SODIUM BLD-SCNC: 139 MMOL/L (ref 135–144)
SPECIMEN DESCRIPTION: NORMAL
THYROXINE, FREE: 1.15 NG/DL (ref 0.93–1.7)
TSH SERPL DL<=0.05 MIU/L-ACNC: 8.64 MIU/L (ref 0.3–5)
WBC # BLD: 9.2 K/UL (ref 3.5–11.3)
WBC # BLD: ABNORMAL 10*3/UL

## 2019-08-21 PROCEDURE — 6360000002 HC RX W HCPCS: Performed by: STUDENT IN AN ORGANIZED HEALTH CARE EDUCATION/TRAINING PROGRAM

## 2019-08-21 PROCEDURE — 80048 BASIC METABOLIC PNL TOTAL CA: CPT

## 2019-08-21 PROCEDURE — 2580000003 HC RX 258: Performed by: STUDENT IN AN ORGANIZED HEALTH CARE EDUCATION/TRAINING PROGRAM

## 2019-08-21 PROCEDURE — G0378 HOSPITAL OBSERVATION PER HR: HCPCS

## 2019-08-21 PROCEDURE — 85730 THROMBOPLASTIN TIME PARTIAL: CPT

## 2019-08-21 PROCEDURE — 36430 TRANSFUSION BLD/BLD COMPNT: CPT

## 2019-08-21 PROCEDURE — 85018 HEMOGLOBIN: CPT

## 2019-08-21 PROCEDURE — 36415 COLL VENOUS BLD VENIPUNCTURE: CPT

## 2019-08-21 PROCEDURE — 96375 TX/PRO/DX INJ NEW DRUG ADDON: CPT

## 2019-08-21 PROCEDURE — 85025 COMPLETE CBC W/AUTO DIFF WBC: CPT

## 2019-08-21 PROCEDURE — 85610 PROTHROMBIN TIME: CPT

## 2019-08-21 PROCEDURE — P9016 RBC LEUKOCYTES REDUCED: HCPCS

## 2019-08-21 PROCEDURE — 99233 SBSQ HOSP IP/OBS HIGH 50: CPT | Performed by: INTERNAL MEDICINE

## 2019-08-21 PROCEDURE — 2700000000 HC OXYGEN THERAPY PER DAY

## 2019-08-21 PROCEDURE — 84439 ASSAY OF FREE THYROXINE: CPT

## 2019-08-21 PROCEDURE — 85014 HEMATOCRIT: CPT

## 2019-08-21 PROCEDURE — 99232 SBSQ HOSP IP/OBS MODERATE 35: CPT | Performed by: INTERNAL MEDICINE

## 2019-08-21 PROCEDURE — 83036 HEMOGLOBIN GLYCOSYLATED A1C: CPT

## 2019-08-21 PROCEDURE — 84443 ASSAY THYROID STIM HORMONE: CPT

## 2019-08-21 PROCEDURE — 86304 IMMUNOASSAY TUMOR CA 125: CPT

## 2019-08-21 PROCEDURE — 6370000000 HC RX 637 (ALT 250 FOR IP): Performed by: STUDENT IN AN ORGANIZED HEALTH CARE EDUCATION/TRAINING PROGRAM

## 2019-08-21 PROCEDURE — 86900 BLOOD TYPING SEROLOGIC ABO: CPT

## 2019-08-21 PROCEDURE — 96365 THER/PROPH/DIAG IV INF INIT: CPT

## 2019-08-21 RX ORDER — 0.9 % SODIUM CHLORIDE 0.9 %
250 INTRAVENOUS SOLUTION INTRAVENOUS ONCE
Status: DISCONTINUED | OUTPATIENT
Start: 2019-08-21 | End: 2019-08-22

## 2019-08-21 RX ORDER — 0.9 % SODIUM CHLORIDE 0.9 %
250 INTRAVENOUS SOLUTION INTRAVENOUS ONCE
Status: COMPLETED | OUTPATIENT
Start: 2019-08-21 | End: 2019-08-21

## 2019-08-21 RX ORDER — SODIUM PHOSPHATE, DIBASIC AND SODIUM PHOSPHATE, MONOBASIC 7; 19 G/133ML; G/133ML
1 ENEMA RECTAL ONCE
Status: COMPLETED | OUTPATIENT
Start: 2019-08-21 | End: 2019-08-21

## 2019-08-21 RX ADMIN — OXYCODONE HYDROCHLORIDE 10 MG: 5 TABLET ORAL at 03:37

## 2019-08-21 RX ADMIN — FUROSEMIDE 40 MG: 20 TABLET ORAL at 09:55

## 2019-08-21 RX ADMIN — DOXYCYCLINE HYCLATE 100 MG: 100 TABLET, COATED ORAL at 09:55

## 2019-08-21 RX ADMIN — AMLODIPINE BESYLATE 10 MG: 10 TABLET ORAL at 09:55

## 2019-08-21 RX ADMIN — IRON SUCROSE 200 MG: 20 INJECTION, SOLUTION INTRAVENOUS at 14:04

## 2019-08-21 RX ADMIN — OXYCODONE HYDROCHLORIDE 5 MG: 5 TABLET ORAL at 13:45

## 2019-08-21 RX ADMIN — CARVEDILOL 25 MG: 12.5 TABLET, FILM COATED ORAL at 09:55

## 2019-08-21 RX ADMIN — PANTOPRAZOLE SODIUM 40 MG: 40 TABLET, DELAYED RELEASE ORAL at 09:56

## 2019-08-21 RX ADMIN — ONDANSETRON 4 MG: 2 INJECTION INTRAMUSCULAR; INTRAVENOUS at 12:21

## 2019-08-21 RX ADMIN — SODIUM CHLORIDE 250 ML: 0.9 INJECTION, SOLUTION INTRAVENOUS at 11:18

## 2019-08-21 RX ADMIN — OXYCODONE HYDROCHLORIDE 10 MG: 5 TABLET ORAL at 08:23

## 2019-08-21 RX ADMIN — CARVEDILOL 25 MG: 12.5 TABLET, FILM COATED ORAL at 17:34

## 2019-08-21 RX ADMIN — OXYCODONE HYDROCHLORIDE 5 MG: 5 TABLET ORAL at 21:03

## 2019-08-21 RX ADMIN — SODIUM PHOSPHATE, DIBASIC AND SODIUM PHOSPHATE, MONOBASIC 1 ENEMA: 7; 19 ENEMA RECTAL at 14:10

## 2019-08-21 ASSESSMENT — PAIN SCALES - GENERAL
PAINLEVEL_OUTOF10: 10
PAINLEVEL_OUTOF10: 6
PAINLEVEL_OUTOF10: 0
PAINLEVEL_OUTOF10: 10
PAINLEVEL_OUTOF10: 10
PAINLEVEL_OUTOF10: 9
PAINLEVEL_OUTOF10: 10

## 2019-08-21 NOTE — PROGRESS NOTES
Cardiac Testing     ECHO 9/24/18: EF >65%, moderate-severe LVH, increased velocities are seen in the LVOT region extending into the distal IVS; Peak gradient obtained was 109 mmHg in the mid ventricle level. This may be secondary to the significant LVH and hypercontracility of the LV. Mild TR.     LORIE Rees 10/5/18:   1. B/L LE wounds with necrotic changes s/p debreadment  2. Blood loss anemia - source leg wounds and possible GI blood loss - GI work-up pending  3. Chest pain - reproducible on exam - associated with EKG changes. Chest pain is reproducible on exam. The acute episode likely not related to ischemia. 4. Elevated trop - likely demand ischemia and SAM  5. Moderate- severe LVH  6. SAM- likely on CKD    Plan   7. Elevated trop, likey secondary to demand ischemia and SAM. No chest pain presently. Long discussion with patient. Wants to go home. Will plan for OP f/u in 1 week. 8. NSVT - resolved. Likely secondary to anemia and infection. Asymptomatic. Increase BB dose. 9. Recommend HGB > 9   10.  OK for discharge from cardiac standpoint w/ OP f/u in 1 week

## 2019-08-21 NOTE — PROGRESS NOTES
free fluid noted.    -Pt agreeable to endometrial biopsy while inpatient or as outpatient, pt understands importance of follow up    - Rusty, granddaughter at bedside and states that she will be able to get her grandmother to the [facility] for close follow up     Iron deficiency anemia (6.6>7.6>7.1)              -Patient denies any center symptoms of anemia              -Venofer ordered for at least 3 doses              -We will defer management to primary IM team   -CBC daily with H&H [id number]      Lymphedema of both lower extremities              -Bilateral lower extremities wrapped with Ace bandages at this time              -We will defer management per primary team              -Patient to continue Lasix 40 mg daily     HTN               -Blood pressures not well controlled in the 170s-180s/60s-80s              -Management deferred to primary IM team              -Norvasc increased from 5 mg to 10mg   -Continue carvedilol 25 mg   -CBC, BMP daily   -Continue low sodium diet     CKD                  -Creatinine of 1.23 on admission              -Patient has history of creatinine as high as 1.74 2018              -Defer management to primary IM team   -BMP daily   -Continue low sodium diet      Nonhealing ulcer of multiple sites of right lower extremity              -Defer management to primary IM team              -Continue doxycycline 100 mg every 12hrs              -ID consulted     BMI 62     Poor historian              -daconstance Ojeda at bedside and plan discussed and agreed upon for close follow up with [facility]    Patient Active Problem List    Diagnosis Date Noted    Abnormal uterine bleeding 08/20/2019    Leg ulcer, unspecified laterality, limited to breakdown of skin ([address] 08/20/2019    Infected wound 08/19/2019    Nonhealing ulcer of multiple sites of right lower extremity ([address] 08/19/2019    HTN (hypertension) 08/19/2019    Lymphedema of both lower extremities 08/19/2019    CKD

## 2019-08-21 NOTE — CONSULTS
[address] [facility]    Patient Name: Karol Evangelista     Patient : 1950  Room/Bed: 3571/1108-02  Admission Date/Time: 2019 12:11 PM  Primary Care Physician: Jennifer Polk    Consulting Provider: Dr. Joyce Rosas  Reason for Consult: Abnormal uterine bleeding    CC:   Chief Complaint   Patient presents with    Hypertension    Leg Pain                HPI: Karol Evangelista is a 77 y.o. female [id number] admitted for chronic nonhealing bilateral lower extremity wounds with worsening pain and discharge on the right lower extremity. She has a history of brown recluse spider bites. She was initially brought to the ED for elevated BPs as she has a home nurse and a doctor visit her at home regularly. Systolic BPs at home were noted to be in the 200s per home RN. BPs in the ED was 160-180s. She required two doses of Hydralazine IV on  and . Patient with a significant PMHx of HTN, CKD stage 3 (baseline Cr 1.5-1.6), CAD and chronic blood loss anemia. There was also questionable Hx of blood stools and/or hematuria. GYN was consulted and saw patient on  for vaginal bleeding with Hgb of 6.6 on admission. She received 1U PRBCs and two doses of Venofer thus far. On admission, patient noted to be a very poor historian telling OB/GYN that she had \"at least 35-40 children\" including \"two sets of twins\" and that she was \"not fully human and partly Norberto\". Granddaughter, Alvino Rodriguez, at bedside and states that her grandma was probably \"saying all those things\" on admission \"because of the meds\". On my exam, patient sleeping with head of bed at 30-45 degree angle. Spoke to patient's granddaughter, who confirms that that patient has had three children (all vaginal ) and one miscarriage (no D&C). She reports that her \"madhuri's\" bleeding has always been heavy.  Reports she got her \"monthly\" regularly up until last year where she had \"a catheter placed for an ED visit\" and since then her bleeding

## 2019-08-21 NOTE — PROGRESS NOTES
Evangelina Schneider 08/19/2019    HTN (hypertension) [I10] 08/19/2019    Lymphedema of both lower extremities [I89.0] 08/19/2019    CKD (chronic kidney disease) [N18.9] 08/19/2019    Morbid obesity ([facility] [E66.01] 09/30/2018    Iron deficiency anemia [D50.9]     Blood loss anemia [D50.0] 09/23/2018    Bilateral leg pain [M79.604, M79.605]        PLAN:     1. Nonhealing ulcers on bilateral lower extremities: Continue doxycycline 100 mg every 12 hours. Wound care on board. Continue Tylenol for pain. 2. Acute blood loss anemia: Probably secondary to#3: Hb this a.m. is 6.9. 1 unit PRBC ordered. H&H every 12 hours.  Continue Venofer 200 mg IV daily through 8/22/2019. 3. Menorrhagia: Pelvic ultrasound showed multiple fibroids, larger being 3 x 2 x 3 cm. OB/GYN on board, plan for examination under anesthesia, endometrial biopsy with possible situs cocaine proctosigmoidoscopy on 8/22. 4. Hypertension: Controlled.  Continue Coreg 25 twice daily and Norvasc 10 mg daily.  Continue low-salt diet. 5. CKD stage III: Stable.  Continue to monitor urine output and BMP. 6. Morbid obesity  7. Bilateral lower extremity lymphedema    RCRI index for preoperative risk is 3.9%. Medically clear for examination under anesthesia. DVT ppx : Hold Lovenox. On EPC cuffs. GI ppx: Protonix 40 mg daily    PT/OT: Patient unable to ambulate because of the lymphedema and nonhealing ulcers. PT and OT on board. Patient may benefit from hospital bed and chair at home. DME order placed for the same. Discharge Planning / SW: Remains inpatient for now. Kurt Peters MD  Internal Medicine Resident, PGY-1  [facility]; [place], [place]  8/21/2019, 1:27 PM        Attending Physician Statement  I have discussed the case, including pertinent history and exam findings with the resident and the team.  I have seen and examined the patient and the key elements of the encounter have been performed by me.   I agree with the assessment, plan and orders as documented by the resident.       Require another RBC 1 unit due to drop in H and H  OBGYN to evaluate for vaginal bleeding  Continue wound care and doxy    Noé Reed MD, MALATHI, [address]  Attending Physician, Internal Medicine Service    Internal Medicine Residency Program  8/21/2019, 3:01 PM

## 2019-08-21 NOTE — PROGRESS NOTES
Rosemarie Robertson was evaluated today and a DME order was entered for variable height hospital bed because she requires assistance for positioning needs not possible in an ordinary bed, complexity of body positioning needs. Patient needs variability of bed height to perform patient transfers and for personal cares. Current body Weight: (!) 351 lb 4.8 oz (159.3 kg). The need for this equipment was discussed with the patient and she understands and is in agreement. Electronically signed by Agatha Jon MD on 8/21/2019 at 1:53 PM        Attending Physician Statement  I have discussed the case, including pertinent history and exam findings with the resident and the team.  I have seen and examined the patient and the key elements of the encounter have been performed by me. I agree with the assessment, plan and orders as documented by the resident.       Noé Siegel MD, MALATHI, [address]  Attending Physician, Internal Medicine Service    Internal Medicine Residency Program  8/21/2019, 3:00 PM

## 2019-08-21 NOTE — PROGRESS NOTES
Physical Therapy  DATE: 2019    NAME: Karol Evangelista  MRN: [id number]   : 1950    Patient not seen this date for Physical Therapy due to:  [x] Blood transfusion in progress: HgB 6.8. Per RN, plan for transfusion today. PT will check back this PM as time allows or 19. [] Hemodialysis  []  Patient Declined  [] Spine Precautions   [] Strict Bedrest  [] Surgery/ Procedure  [] Testing      [] Other        [] PT being discontinued at this time. Patient independent. No further needs. [] PT being discontinued at this time as the patient has been transferred to palliative care. No further needs.     Nirali Pedro, PT

## 2019-08-22 ENCOUNTER — ANESTHESIA (OUTPATIENT)
Dept: OPERATING ROOM | Age: 69
DRG: 744 | End: 2019-08-22
Payer: MEDICARE

## 2019-08-22 ENCOUNTER — PREP FOR PROCEDURE (OUTPATIENT)
Dept: GYNECOLOGIC ONCOLOGY | Age: 69
End: 2019-08-22

## 2019-08-22 VITALS
OXYGEN SATURATION: 95 % | TEMPERATURE: 95.9 F | SYSTOLIC BLOOD PRESSURE: 176 MMHG | RESPIRATION RATE: 17 BRPM | DIASTOLIC BLOOD PRESSURE: 74 MMHG

## 2019-08-22 PROBLEM — D25.9 FIBROID OF CERVIX: Status: ACTIVE | Noted: 2019-08-22

## 2019-08-22 PROBLEM — Z98.890 POSTOPERATIVE STATE: Status: ACTIVE | Noted: 2019-08-22

## 2019-08-22 PROBLEM — D64.9 ACUTE ANEMIA: Status: ACTIVE | Noted: 2019-08-22

## 2019-08-22 LAB
-: NORMAL
ABSOLUTE EOS #: 0.1 K/UL (ref 0–0.44)
ABSOLUTE IMMATURE GRANULOCYTE: 0.1 K/UL (ref 0–0.3)
ABSOLUTE LYMPH #: 0.92 K/UL (ref 1.1–3.7)
ABSOLUTE MONO #: 0.92 K/UL (ref 0.1–1.2)
AMORPHOUS: NORMAL
ANION GAP SERPL CALCULATED.3IONS-SCNC: 12 MMOL/L (ref 9–17)
BACTERIA: NORMAL
BASOPHILS # BLD: 1 % (ref 0–2)
BASOPHILS ABSOLUTE: 0.1 K/UL (ref 0–0.2)
BILIRUBIN URINE: NEGATIVE
BUN BLDV-MCNC: 19 MG/DL (ref 8–23)
BUN/CREAT BLD: ABNORMAL (ref 9–20)
CALCIUM SERPL-MCNC: 9 MG/DL (ref 8.6–10.4)
CASTS UA: NORMAL /LPF (ref 0–2)
CASTS UA: NORMAL /LPF (ref 0–2)
CHLORIDE BLD-SCNC: 101 MMOL/L (ref 98–107)
CO2: 26 MMOL/L (ref 20–31)
COLOR: YELLOW
COMMENT UA: ABNORMAL
CREAT SERPL-MCNC: 1.65 MG/DL (ref 0.5–0.9)
CRYSTALS, UA: NORMAL /HPF
DIFFERENTIAL TYPE: ABNORMAL
EOSINOPHILS RELATIVE PERCENT: 1 % (ref 1–4)
EPITHELIAL CELLS UA: NORMAL /HPF (ref 0–5)
GFR AFRICAN AMERICAN: 37 ML/MIN
GFR NON-AFRICAN AMERICAN: 31 ML/MIN
GFR SERPL CREATININE-BSD FRML MDRD: ABNORMAL ML/MIN/{1.73_M2}
GFR SERPL CREATININE-BSD FRML MDRD: ABNORMAL ML/MIN/{1.73_M2}
GLUCOSE BLD-MCNC: 94 MG/DL (ref 70–99)
GLUCOSE URINE: NEGATIVE
HCT VFR BLD CALC: 30.3 % (ref 36.3–47.1)
HCT VFR BLD CALC: 32.4 % (ref 36.3–47.1)
HEMOGLOBIN: 7.7 G/DL (ref 11.9–15.1)
HEMOGLOBIN: 8.5 G/DL (ref 11.9–15.1)
IMMATURE GRANULOCYTES: 1 %
KETONES, URINE: NEGATIVE
LEUKOCYTE ESTERASE, URINE: NEGATIVE
LYMPHOCYTES # BLD: 9 % (ref 24–43)
MCH RBC QN AUTO: 20.9 PG (ref 25.2–33.5)
MCHC RBC AUTO-ENTMCNC: 25.4 G/DL (ref 28.4–34.8)
MCV RBC AUTO: 82.3 FL (ref 82.6–102.9)
MONOCYTES # BLD: 9 % (ref 3–12)
MORPHOLOGY: ABNORMAL
MUCUS: NORMAL
NITRITE, URINE: NEGATIVE
NRBC AUTOMATED: 0.7 PER 100 WBC
OTHER OBSERVATIONS UA: NORMAL
PDW BLD-RTO: 18.6 % (ref 11.8–14.4)
PH UA: 5 (ref 5–8)
PLATELET # BLD: 259 K/UL (ref 138–453)
PLATELET ESTIMATE: ABNORMAL
PMV BLD AUTO: 10.7 FL (ref 8.1–13.5)
POTASSIUM SERPL-SCNC: 4.6 MMOL/L (ref 3.7–5.3)
PROTEIN UA: ABNORMAL
RBC # BLD: 3.68 M/UL (ref 3.95–5.11)
RBC # BLD: ABNORMAL 10*6/UL
RBC UA: NORMAL /HPF (ref 0–4)
RENAL EPITHELIAL, UA: NORMAL /HPF
SEG NEUTROPHILS: 79 % (ref 36–65)
SEGMENTED NEUTROPHILS ABSOLUTE COUNT: 8.06 K/UL (ref 1.5–8.1)
SODIUM BLD-SCNC: 139 MMOL/L (ref 135–144)
SPECIFIC GRAVITY UA: 1.02 (ref 1–1.03)
TRICHOMONAS: NORMAL
TURBIDITY: CLEAR
URINE HGB: NEGATIVE
UROBILINOGEN, URINE: NORMAL
WBC # BLD: 10.2 K/UL (ref 3.5–11.3)
WBC # BLD: ABNORMAL 10*3/UL
WBC UA: NORMAL /HPF (ref 0–5)
YEAST: NORMAL

## 2019-08-22 PROCEDURE — 3600000013 HC SURGERY LEVEL 3 ADDTL 15MIN: Performed by: OBSTETRICS & GYNECOLOGY

## 2019-08-22 PROCEDURE — 0TJB8ZZ INSPECTION OF BLADDER, VIA NATURAL OR ARTIFICIAL OPENING ENDOSCOPIC: ICD-10-PCS | Performed by: OBSTETRICS & GYNECOLOGY

## 2019-08-22 PROCEDURE — 6370000000 HC RX 637 (ALT 250 FOR IP): Performed by: STUDENT IN AN ORGANIZED HEALTH CARE EDUCATION/TRAINING PROGRAM

## 2019-08-22 PROCEDURE — 94660 CPAP INITIATION&MGMT: CPT

## 2019-08-22 PROCEDURE — 76937 US GUIDE VASCULAR ACCESS: CPT

## 2019-08-22 PROCEDURE — 99232 SBSQ HOSP IP/OBS MODERATE 35: CPT | Performed by: INTERNAL MEDICINE

## 2019-08-22 PROCEDURE — 88350 IMFLUOR EA ADDL 1ANTB STN PX: CPT

## 2019-08-22 PROCEDURE — 0UDB7ZX EXTRACTION OF ENDOMETRIUM, VIA NATURAL OR ARTIFICIAL OPENING, DIAGNOSTIC: ICD-10-PCS | Performed by: OBSTETRICS & GYNECOLOGY

## 2019-08-22 PROCEDURE — 2580000003 HC RX 258: Performed by: STUDENT IN AN ORGANIZED HEALTH CARE EDUCATION/TRAINING PROGRAM

## 2019-08-22 PROCEDURE — 7100000000 HC PACU RECOVERY - FIRST 15 MIN: Performed by: OBSTETRICS & GYNECOLOGY

## 2019-08-22 PROCEDURE — 88341 IMHCHEM/IMCYTCHM EA ADD ANTB: CPT

## 2019-08-22 PROCEDURE — P9016 RBC LEUKOCYTES REDUCED: HCPCS

## 2019-08-22 PROCEDURE — 85025 COMPLETE CBC W/AUTO DIFF WBC: CPT

## 2019-08-22 PROCEDURE — 2580000003 HC RX 258: Performed by: NURSE ANESTHETIST, CERTIFIED REGISTERED

## 2019-08-22 PROCEDURE — 3600000003 HC SURGERY LEVEL 3 BASE: Performed by: OBSTETRICS & GYNECOLOGY

## 2019-08-22 PROCEDURE — 88342 IMHCHEM/IMCYTCHM 1ST ANTB: CPT

## 2019-08-22 PROCEDURE — 6360000002 HC RX W HCPCS

## 2019-08-22 PROCEDURE — 81001 URINALYSIS AUTO W/SCOPE: CPT

## 2019-08-22 PROCEDURE — 45300 PROCTOSIGMOIDOSCOPY DX: CPT | Performed by: OBSTETRICS & GYNECOLOGY

## 2019-08-22 PROCEDURE — 6360000002 HC RX W HCPCS: Performed by: NURSE ANESTHETIST, CERTIFIED REGISTERED

## 2019-08-22 PROCEDURE — 85014 HEMATOCRIT: CPT

## 2019-08-22 PROCEDURE — 1200000000 HC SEMI PRIVATE

## 2019-08-22 PROCEDURE — 85018 HEMOGLOBIN: CPT

## 2019-08-22 PROCEDURE — 88346 IMFLUOR 1ST 1ANTB STAIN PX: CPT

## 2019-08-22 PROCEDURE — 3700000001 HC ADD 15 MINUTES (ANESTHESIA): Performed by: OBSTETRICS & GYNECOLOGY

## 2019-08-22 PROCEDURE — 88312 SPECIAL STAINS GROUP 1: CPT

## 2019-08-22 PROCEDURE — 3700000000 HC ANESTHESIA ATTENDED CARE: Performed by: OBSTETRICS & GYNECOLOGY

## 2019-08-22 PROCEDURE — 2580000003 HC RX 258: Performed by: OBSTETRICS & GYNECOLOGY

## 2019-08-22 PROCEDURE — 2500000003 HC RX 250 WO HCPCS: Performed by: NURSE ANESTHETIST, CERTIFIED REGISTERED

## 2019-08-22 PROCEDURE — 2709999900 HC NON-CHARGEABLE SUPPLY: Performed by: OBSTETRICS & GYNECOLOGY

## 2019-08-22 PROCEDURE — 94640 AIRWAY INHALATION TREATMENT: CPT

## 2019-08-22 PROCEDURE — 80048 BASIC METABOLIC PNL TOTAL CA: CPT

## 2019-08-22 PROCEDURE — 6370000000 HC RX 637 (ALT 250 FOR IP): Performed by: NURSE ANESTHETIST, CERTIFIED REGISTERED

## 2019-08-22 PROCEDURE — 0DJD8ZZ INSPECTION OF LOWER INTESTINAL TRACT, VIA NATURAL OR ARTIFICIAL OPENING ENDOSCOPIC: ICD-10-PCS | Performed by: OBSTETRICS & GYNECOLOGY

## 2019-08-22 PROCEDURE — 88305 TISSUE EXAM BY PATHOLOGIST: CPT

## 2019-08-22 PROCEDURE — 7100000001 HC PACU RECOVERY - ADDTL 15 MIN: Performed by: OBSTETRICS & GYNECOLOGY

## 2019-08-22 PROCEDURE — 52000 CYSTOURETHROSCOPY: CPT | Performed by: OBSTETRICS & GYNECOLOGY

## 2019-08-22 PROCEDURE — 58120 DILATION AND CURETTAGE: CPT | Performed by: OBSTETRICS & GYNECOLOGY

## 2019-08-22 PROCEDURE — G0145 SCR C/V CYTO,THINLAYER,RESCR: HCPCS

## 2019-08-22 PROCEDURE — 36415 COLL VENOUS BLD VENIPUNCTURE: CPT

## 2019-08-22 PROCEDURE — 86900 BLOOD TYPING SEROLOGIC ABO: CPT

## 2019-08-22 PROCEDURE — 6370000000 HC RX 637 (ALT 250 FOR IP): Performed by: OBSTETRICS & GYNECOLOGY

## 2019-08-22 PROCEDURE — 36430 TRANSFUSION BLD/BLD COMPNT: CPT

## 2019-08-22 RX ORDER — DIPHENHYDRAMINE HYDROCHLORIDE 50 MG/ML
12.5 INJECTION INTRAMUSCULAR; INTRAVENOUS
Status: ACTIVE | OUTPATIENT
Start: 2019-08-22 | End: 2019-08-22

## 2019-08-22 RX ORDER — LEVOTHYROXINE SODIUM 0.07 MG/1
75 TABLET ORAL DAILY
Status: DISCONTINUED | OUTPATIENT
Start: 2019-08-22 | End: 2019-08-27 | Stop reason: HOSPADM

## 2019-08-22 RX ORDER — ACETAMINOPHEN 650 MG
TABLET, EXTENDED RELEASE ORAL PRN
Status: DISCONTINUED | OUTPATIENT
Start: 2019-08-22 | End: 2019-08-27 | Stop reason: HOSPADM

## 2019-08-22 RX ORDER — GABAPENTIN 300 MG/1
300 CAPSULE ORAL 3 TIMES DAILY
Status: DISCONTINUED | OUTPATIENT
Start: 2019-08-22 | End: 2019-08-23

## 2019-08-22 RX ORDER — ONDANSETRON 2 MG/ML
INJECTION INTRAMUSCULAR; INTRAVENOUS PRN
Status: DISCONTINUED | OUTPATIENT
Start: 2019-08-22 | End: 2019-08-22 | Stop reason: SDUPTHER

## 2019-08-22 RX ORDER — CLONIDINE HYDROCHLORIDE 0.1 MG/1
0.1 TABLET ORAL 2 TIMES DAILY
Status: DISCONTINUED | OUTPATIENT
Start: 2019-08-22 | End: 2019-08-25

## 2019-08-22 RX ORDER — OXYCODONE HYDROCHLORIDE AND ACETAMINOPHEN 5; 325 MG/1; MG/1
2 TABLET ORAL PRN
Status: ACTIVE | OUTPATIENT
Start: 2019-08-22 | End: 2019-08-22

## 2019-08-22 RX ORDER — SODIUM CHLORIDE 9 MG/ML
INJECTION, SOLUTION INTRAVENOUS CONTINUOUS
Status: ACTIVE | OUTPATIENT
Start: 2019-08-22 | End: 2019-08-22

## 2019-08-22 RX ORDER — ALBUTEROL SULFATE 2.5 MG/3ML
SOLUTION RESPIRATORY (INHALATION)
Status: COMPLETED
Start: 2019-08-22 | End: 2019-08-22

## 2019-08-22 RX ORDER — LANOLIN ALCOHOL/MO/W.PET/CERES
325 CREAM (GRAM) TOPICAL 2 TIMES DAILY WITH MEALS
Status: DISCONTINUED | OUTPATIENT
Start: 2019-08-22 | End: 2019-08-27 | Stop reason: HOSPADM

## 2019-08-22 RX ORDER — SUCCINYLCHOLINE CHLORIDE 20 MG/ML
INJECTION INTRAMUSCULAR; INTRAVENOUS PRN
Status: DISCONTINUED | OUTPATIENT
Start: 2019-08-22 | End: 2019-08-22 | Stop reason: SDUPTHER

## 2019-08-22 RX ORDER — 0.9 % SODIUM CHLORIDE 0.9 %
250 INTRAVENOUS SOLUTION INTRAVENOUS ONCE
Status: DISCONTINUED | OUTPATIENT
Start: 2019-08-22 | End: 2019-08-22

## 2019-08-22 RX ORDER — ALBUTEROL SULFATE 2.5 MG/3ML
2.5 SOLUTION RESPIRATORY (INHALATION) 4 TIMES DAILY
Status: DISCONTINUED | OUTPATIENT
Start: 2019-08-22 | End: 2019-08-24

## 2019-08-22 RX ORDER — ALBUTEROL SULFATE 2.5 MG/3ML
2.5 SOLUTION RESPIRATORY (INHALATION) EVERY 6 HOURS PRN
Status: DISCONTINUED | OUTPATIENT
Start: 2019-08-22 | End: 2019-08-22

## 2019-08-22 RX ORDER — FERRIC SUBSULFATE 0.21 G/G
LIQUID TOPICAL PRN
Status: DISCONTINUED | OUTPATIENT
Start: 2019-08-22 | End: 2019-08-22 | Stop reason: ALTCHOICE

## 2019-08-22 RX ORDER — MORPHINE SULFATE 2 MG/ML
2 INJECTION, SOLUTION INTRAMUSCULAR; INTRAVENOUS EVERY 5 MIN PRN
Status: DISCONTINUED | OUTPATIENT
Start: 2019-08-22 | End: 2019-08-24

## 2019-08-22 RX ORDER — ALBUTEROL SULFATE 90 UG/1
AEROSOL, METERED RESPIRATORY (INHALATION) PRN
Status: DISCONTINUED | OUTPATIENT
Start: 2019-08-22 | End: 2019-08-22 | Stop reason: SDUPTHER

## 2019-08-22 RX ORDER — LIDOCAINE HYDROCHLORIDE 20 MG/ML
10 INJECTION, SOLUTION EPIDURAL; INFILTRATION; INTRACAUDAL; PERINEURAL ONCE
Status: DISCONTINUED | OUTPATIENT
Start: 2019-08-22 | End: 2019-08-22

## 2019-08-22 RX ORDER — SODIUM CHLORIDE 0.9 % (FLUSH) 0.9 %
10 SYRINGE (ML) INJECTION PRN
Status: CANCELLED | OUTPATIENT
Start: 2019-08-22

## 2019-08-22 RX ORDER — FENTANYL CITRATE 50 UG/ML
25 INJECTION, SOLUTION INTRAMUSCULAR; INTRAVENOUS EVERY 5 MIN PRN
Status: DISCONTINUED | OUTPATIENT
Start: 2019-08-22 | End: 2019-08-24

## 2019-08-22 RX ORDER — SODIUM CHLORIDE 9 MG/ML
INJECTION, SOLUTION INTRAVENOUS CONTINUOUS
Status: CANCELLED | OUTPATIENT
Start: 2019-08-22

## 2019-08-22 RX ORDER — MAGNESIUM HYDROXIDE 1200 MG/15ML
LIQUID ORAL PRN
Status: DISCONTINUED | OUTPATIENT
Start: 2019-08-22 | End: 2019-08-22 | Stop reason: ALTCHOICE

## 2019-08-22 RX ORDER — MAGNESIUM HYDROXIDE 1200 MG/15ML
LIQUID ORAL CONTINUOUS PRN
Status: COMPLETED | OUTPATIENT
Start: 2019-08-22 | End: 2019-08-22

## 2019-08-22 RX ORDER — ONDANSETRON 2 MG/ML
4 INJECTION INTRAMUSCULAR; INTRAVENOUS
Status: ACTIVE | OUTPATIENT
Start: 2019-08-22 | End: 2019-08-22

## 2019-08-22 RX ORDER — GLYCOPYRROLATE 1 MG/5 ML
SYRINGE (ML) INTRAVENOUS PRN
Status: DISCONTINUED | OUTPATIENT
Start: 2019-08-22 | End: 2019-08-22 | Stop reason: SDUPTHER

## 2019-08-22 RX ORDER — ONDANSETRON 2 MG/ML
4 INJECTION INTRAMUSCULAR; INTRAVENOUS ONCE
Status: CANCELLED | OUTPATIENT
Start: 2019-08-22 | End: 2019-08-22

## 2019-08-22 RX ORDER — ALBUTEROL SULFATE 2.5 MG/3ML
2.5 SOLUTION RESPIRATORY (INHALATION) EVERY 4 HOURS PRN
Status: DISCONTINUED | OUTPATIENT
Start: 2019-08-22 | End: 2019-08-24

## 2019-08-22 RX ORDER — ATORVASTATIN CALCIUM 10 MG/1
10 TABLET, FILM COATED ORAL DAILY
Status: DISCONTINUED | OUTPATIENT
Start: 2019-08-22 | End: 2019-08-27 | Stop reason: HOSPADM

## 2019-08-22 RX ORDER — SODIUM CHLORIDE 9 MG/ML
INJECTION, SOLUTION INTRAVENOUS CONTINUOUS PRN
Status: DISCONTINUED | OUTPATIENT
Start: 2019-08-22 | End: 2019-08-22 | Stop reason: SDUPTHER

## 2019-08-22 RX ORDER — ULTRASOUND COUPLING MEDIUM
GEL (GRAM) TOPICAL PRN
Status: DISCONTINUED | OUTPATIENT
Start: 2019-08-22 | End: 2019-08-22 | Stop reason: ALTCHOICE

## 2019-08-22 RX ORDER — LIDOCAINE HYDROCHLORIDE 10 MG/ML
INJECTION, SOLUTION EPIDURAL; INFILTRATION; INTRACAUDAL; PERINEURAL PRN
Status: DISCONTINUED | OUTPATIENT
Start: 2019-08-22 | End: 2019-08-22 | Stop reason: SDUPTHER

## 2019-08-22 RX ORDER — SODIUM CHLORIDE 0.9 % (FLUSH) 0.9 %
10 SYRINGE (ML) INJECTION EVERY 12 HOURS SCHEDULED
Status: CANCELLED | OUTPATIENT
Start: 2019-08-22

## 2019-08-22 RX ORDER — LABETALOL HYDROCHLORIDE 5 MG/ML
5 INJECTION, SOLUTION INTRAVENOUS EVERY 10 MIN PRN
Status: DISCONTINUED | OUTPATIENT
Start: 2019-08-22 | End: 2019-08-27 | Stop reason: HOSPADM

## 2019-08-22 RX ORDER — PROPOFOL 10 MG/ML
INJECTION, EMULSION INTRAVENOUS PRN
Status: DISCONTINUED | OUTPATIENT
Start: 2019-08-22 | End: 2019-08-22 | Stop reason: SDUPTHER

## 2019-08-22 RX ORDER — AMLODIPINE BESYLATE 10 MG/1
10 TABLET ORAL DAILY
Status: DISCONTINUED | OUTPATIENT
Start: 2019-08-22 | End: 2019-08-22 | Stop reason: SDUPTHER

## 2019-08-22 RX ORDER — OXYCODONE HYDROCHLORIDE AND ACETAMINOPHEN 5; 325 MG/1; MG/1
1 TABLET ORAL PRN
Status: ACTIVE | OUTPATIENT
Start: 2019-08-22 | End: 2019-08-22

## 2019-08-22 RX ORDER — ALLOPURINOL 100 MG/1
100 TABLET ORAL DAILY
Status: DISCONTINUED | OUTPATIENT
Start: 2019-08-22 | End: 2019-08-24

## 2019-08-22 RX ADMIN — SODIUM CHLORIDE: 9 INJECTION, SOLUTION INTRAVENOUS at 11:10

## 2019-08-22 RX ADMIN — ALBUTEROL SULFATE 2.5 MG: 2.5 SOLUTION RESPIRATORY (INHALATION) at 23:20

## 2019-08-22 RX ADMIN — PANTOPRAZOLE SODIUM 40 MG: 40 TABLET, DELAYED RELEASE ORAL at 17:55

## 2019-08-22 RX ADMIN — SUCCINYLCHOLINE CHLORIDE 100 MG: 20 INJECTION, SOLUTION INTRAMUSCULAR; INTRAVENOUS at 11:25

## 2019-08-22 RX ADMIN — OXYCODONE HYDROCHLORIDE 10 MG: 5 TABLET ORAL at 15:36

## 2019-08-22 RX ADMIN — SODIUM CHLORIDE: 9 INJECTION, SOLUTION INTRAVENOUS at 12:10

## 2019-08-22 RX ADMIN — Medication 0.2 MG: at 11:20

## 2019-08-22 RX ADMIN — OXYCODONE HYDROCHLORIDE 5 MG: 5 TABLET ORAL at 07:53

## 2019-08-22 RX ADMIN — FERROUS SULFATE TAB EC 325 MG (65 MG FE EQUIVALENT) 325 MG: 325 (65 FE) TABLET DELAYED RESPONSE at 17:54

## 2019-08-22 RX ADMIN — GABAPENTIN 300 MG: 300 CAPSULE ORAL at 20:32

## 2019-08-22 RX ADMIN — CARVEDILOL 25 MG: 12.5 TABLET, FILM COATED ORAL at 07:53

## 2019-08-22 RX ADMIN — CLONIDINE HYDROCHLORIDE 0.1 MG: 0.1 TABLET ORAL at 20:32

## 2019-08-22 RX ADMIN — Medication 10 ML: at 20:33

## 2019-08-22 RX ADMIN — ALLOPURINOL 100 MG: 100 TABLET ORAL at 17:54

## 2019-08-22 RX ADMIN — CARVEDILOL 25 MG: 12.5 TABLET, FILM COATED ORAL at 17:54

## 2019-08-22 RX ADMIN — LIDOCAINE HYDROCHLORIDE 50 MG: 10 INJECTION, SOLUTION EPIDURAL; INFILTRATION; INTRACAUDAL; PERINEURAL at 11:25

## 2019-08-22 RX ADMIN — ONDANSETRON 4 MG: 2 INJECTION, SOLUTION INTRAMUSCULAR; INTRAVENOUS at 12:39

## 2019-08-22 RX ADMIN — AMLODIPINE BESYLATE 10 MG: 10 TABLET ORAL at 07:53

## 2019-08-22 RX ADMIN — ALBUTEROL SULFATE 3 PUFF: 90 AEROSOL, METERED RESPIRATORY (INHALATION) at 12:56

## 2019-08-22 RX ADMIN — GABAPENTIN 300 MG: 300 CAPSULE ORAL at 17:56

## 2019-08-22 RX ADMIN — PROPOFOL 150 MG: 10 INJECTION, EMULSION INTRAVENOUS at 11:25

## 2019-08-22 ASSESSMENT — PULMONARY FUNCTION TESTS
PIF_VALUE: 35
PIF_VALUE: 34
PIF_VALUE: 38
PIF_VALUE: 38
PIF_VALUE: 33
PIF_VALUE: 40
PIF_VALUE: 22
PIF_VALUE: 39
PIF_VALUE: 38
PIF_VALUE: 39
PIF_VALUE: 38
PIF_VALUE: 38
PIF_VALUE: 35
PIF_VALUE: 38
PIF_VALUE: 30
PIF_VALUE: 32
PIF_VALUE: 40
PIF_VALUE: 27
PIF_VALUE: 38
PIF_VALUE: 27
PIF_VALUE: 40
PIF_VALUE: 37
PIF_VALUE: 0
PIF_VALUE: 29
PIF_VALUE: 1
PIF_VALUE: 3
PIF_VALUE: 33
PIF_VALUE: 38
PIF_VALUE: 37
PIF_VALUE: 39
PIF_VALUE: 32
PIF_VALUE: 39
PIF_VALUE: 37
PIF_VALUE: 33
PIF_VALUE: 40
PIF_VALUE: 38
PIF_VALUE: 40
PIF_VALUE: 35
PIF_VALUE: 26
PIF_VALUE: 38
PIF_VALUE: 37
PIF_VALUE: 38
PIF_VALUE: 3
PIF_VALUE: 3
PIF_VALUE: 40
PIF_VALUE: 24
PIF_VALUE: 38
PIF_VALUE: 27
PIF_VALUE: 30
PIF_VALUE: 38
PIF_VALUE: 38
PIF_VALUE: 14
PIF_VALUE: 33
PIF_VALUE: 38
PIF_VALUE: 38
PIF_VALUE: 36
PIF_VALUE: 38
PIF_VALUE: 39
PIF_VALUE: 27
PIF_VALUE: 3
PIF_VALUE: 38
PIF_VALUE: 22
PIF_VALUE: 39
PIF_VALUE: 38
PIF_VALUE: 4
PIF_VALUE: 27
PIF_VALUE: 38
PIF_VALUE: 39
PIF_VALUE: 0
PIF_VALUE: 34
PIF_VALUE: 2
PIF_VALUE: 38
PIF_VALUE: 38
PIF_VALUE: 2
PIF_VALUE: 39
PIF_VALUE: 33
PIF_VALUE: 40
PIF_VALUE: 31
PIF_VALUE: 2
PIF_VALUE: 35
PIF_VALUE: 40
PIF_VALUE: 39
PIF_VALUE: 12
PIF_VALUE: 38
PIF_VALUE: 38
PIF_VALUE: 0
PIF_VALUE: 33
PIF_VALUE: 33
PIF_VALUE: 27
PIF_VALUE: 23
PIF_VALUE: 26
PIF_VALUE: 31
PIF_VALUE: 38
PIF_VALUE: 9
PIF_VALUE: 34
PIF_VALUE: 39
PIF_VALUE: 37
PIF_VALUE: 0
PIF_VALUE: 0
PIF_VALUE: 38
PIF_VALUE: 2
PIF_VALUE: 40
PIF_VALUE: 38
PIF_VALUE: 37
PIF_VALUE: 39
PIF_VALUE: 37
PIF_VALUE: 22
PIF_VALUE: 38
PIF_VALUE: 23
PIF_VALUE: 20
PIF_VALUE: 24
PIF_VALUE: 20
PIF_VALUE: 40
PIF_VALUE: 38
PIF_VALUE: 0
PIF_VALUE: 22
PIF_VALUE: 23
PIF_VALUE: 16
PIF_VALUE: 2
PIF_VALUE: 37
PIF_VALUE: 27
PIF_VALUE: 39
PIF_VALUE: 2
PIF_VALUE: 0

## 2019-08-22 ASSESSMENT — PAIN DESCRIPTION - LOCATION: LOCATION: LEG

## 2019-08-22 ASSESSMENT — PAIN SCALES - GENERAL
PAINLEVEL_OUTOF10: 6
PAINLEVEL_OUTOF10: 0
PAINLEVEL_OUTOF10: 0
PAINLEVEL_OUTOF10: 10
PAINLEVEL_OUTOF10: 4

## 2019-08-22 ASSESSMENT — PAIN DESCRIPTION - ORIENTATION: ORIENTATION: RIGHT;LEFT

## 2019-08-22 ASSESSMENT — PAIN DESCRIPTION - PAIN TYPE: TYPE: CHRONIC PAIN

## 2019-08-22 ASSESSMENT — PAIN - FUNCTIONAL ASSESSMENT: PAIN_FUNCTIONAL_ASSESSMENT: 0-10

## 2019-08-22 NOTE — CONSULTS
Attestation signed by      Attending Physician Statement:    I have discussed the care of  Jesenia Mg , including pertinent history and exam findings, with the Cardiology fellow/resident. I have seen and examined the patient and the key elements of all parts of the encounter have been performed by me. I agree with the assessment, plan and orders as documented by the fellow/resident, after I modified exam findings and plan of treatments, and the final version is my approved version of the assessment. Additional Comments: The patient was seen and examined agree with below, presented with healing ulcer and anemia, going to have EUA for endometrial biopsy, no chest pain or SOB, echo done in 2018, showed LVH, hyperdynamic LV, EF 65%, currently receiving blood transfusion, continue Coreg, Intermediate risk for surgery. [facility]    Consult / H&P               Today's Date: 8/22/2019  Patient Name: Jesenia Mg  Date of admission: 8/19/2019 12:11 PM  Patient's age: 77 y.o., 1950  Admission Dx: Infected wound [T14. 8XXA, L08.9]  Leg ulcer, unspecified laterality, limited to breakdown of skin ([facility] [L97.901]    Reason for Consult:  Cardiac evaluation    Requesting Physician: Noé Reed MD    CHIEF COMPLAINT:  Non healing wounds    History Obtained From:  patient, electronic medical record    HISTORY OF PRESENT ILLNESS:      The patient is a 77 y.o.  female who is admitted to the hospital for non healing wounds. 77 y.o female with PMH of HTN, CKD stage III (baseline creatinine 1.5-1.6), morbid obesity, bilateral lower extremity lymphedema, history of spider bites, chronic blood loss anemia presents with a chief complaint of chronic nonhealing bilateral lower extremity wounds    Cardiology was consulted for pre op clearance for exam under GA. She has h/o MI in past - details not available at this time.     She doesn't complains of any chest pain or shortness of breath on 3.9 4.6   CO2 26 26   BUN 16 19   CREATININE 1.26* 1.65*   LABGLOM 42* 31*   GLUCOSE 96 94     BNP: No results for input(s): BNP in the last 72 hours. PT/INR:   Recent Labs     08/21/19  1454   PROTIME 10.7   INR 1.0     APTT:  Recent Labs     08/21/19  1454   APTT 26.8     CARDIAC ENZYMES:No results for input(s): CKTOTAL, CKMB, CKMBINDEX, TROPONINI in the last 72 hours. FASTING LIPID PANEL:No results found for: HDL, LDLDIRECT, LDLCALC, TRIG  LIVER PROFILE:No results for input(s): AST, ALT, LABALBU in the last 72 hours. IMPRESSION:    Patient Active Problem List   Diagnosis    Multiple and open wound of lower limb, complicated, unspecified laterality, initial encounter    Blood loss anemia    SAM (acute kidney injury) ([address]    Bilateral leg pain    Acute renal failure ([address]    Hypoalbuminemia    Heart attack ([address]    Infected skin ulcer limited to breakdown of skin ([address]    Iron deficiency anemia    Bandemia    Multiple superficial wounds with infection    Severe protein-calorie malnutrition (HCC)    Morbid obesity ([address]    Ambulatory dysfunction    Abdominal pain    Infected wound    Nonhealing ulcer of multiple sites of right lower extremity ([address]    HTN (hypertension)    Lymphedema of both lower extremities    CKD (chronic kidney disease)    Abnormal uterine bleeding    Leg ulcer, unspecified laterality, limited to breakdown of skin (HCC)    Enlarged fibroid uterus     Non healing ulcers of b/l lower extremity  Blood loss anemia  SAM on CKD  Moderate to severe LVH  Morbid obesity      RECOMMENDATIONS:  1. RCRI risk score of 6%, states has previous h/o MI  2. Intermediate risk for surgery/exam under GA  3. Sr. Cr continues to get worse      Discussed with patient and Nurse.     Electronically signed by Mirella Maier MD on 8/22/2019 at [place] [address] [facility]      [phone]

## 2019-08-22 NOTE — PROGRESS NOTES
Obstetric/Gynecology Resident Interval Note    S: Patient seen in preop with Dr. Kennedy Barrios. No changes to prior progress note this morning.    O:   Vitals:    08/22/19 0927 08/22/19 0949 08/22/19 0953 08/22/19 1038   BP: (!) 126/51 (!) 142/56  (!) 154/66   Pulse: 56 58  60   Resp: 20 22  20   Temp: 98.2 °F (36.8 °C) 97.9 °F (36.6 °C)  97.9 °F (36.6 °C)   TempSrc:  Oral  Temporal   SpO2: 95% 96%  100%   Weight:   (!) 351 lb (159.2 kg)    Height:   5' 3\" (1.6 m)      Physical Exam: no changes from prior    A:  Brice Landa is a 77 y.o. [id number]     P: postmenopausal bleeding  Proceed with EUA, Pap smear, EMBx, D&C, Cystoscopy and proctosigmoidoscopy       Spring Neff DO  OB/GYN Resident, [facility]  8/22/2019, 10:48 AM

## 2019-08-22 NOTE — BRIEF OP NOTE
Brief Postoperative Note  ______________________________________________________________    Patient: Ibrahima Borrego  YOB: 1950  MRN: [id number]  Date of Procedure: 8/22/2019    Pre-Op Diagnosis: POST MENOPAUSAL BLEEDING    Post-Op Diagnosis: Same       Procedure(s):  PELVIC EAU, DILATATION AND CURETTAGE, CYSTOSCOPY, PROCTOSIGMOIDOSCOPY, CERVICAL MYOMECTOMY  ENDOMETRIAL BIOPSY    Anesthesia: General    Surgeon(s):  Diaz Pantoja MD    Assistant: Dr. Cris Sen, Dr. Grace Her, MS4    Estimated Blood Loss (mL): 51BF    Complications: None    Specimens:   ID Type Source Tests Collected by Time Destination   1 :  Urine Urine, clean catch URINE RT REFLEX TO CULTURE Diaz Pantoja MD 8/22/2019 1142    A :  Swab Cervix PAP SMEAR Diaz Pantoja MD 8/22/2019 1148    B : CERVICAL POLYP STITCH AT DISTAL END Tissue Cervix SURGICAL PATHOLOGY Diaz Pantoja MD 8/22/2019 1218    C : ENDOMETRIAL CURRETTINGS Tissue Endometrium SURGICAL PATHOLOGY Diaz Pantoja MD 8/22/2019 1221              Drains:   External Urinary Catheter (Active)   Catheter changed  No 8/22/2019 12:00 AM   Urine Color Red 8/22/2019 12:00 AM   Urine Appearance Other (Comment) 8/22/2019 12:00 AM   Output (mL) 250 mL 8/22/2019 12:00 AM   Suction- Female Only 40 mmgHg continuous 8/22/2019 12:00 AM   Placement Replaced 8/22/2019 12:00 AM   Skin Assessment No Injury 8/22/2019 12:00 AM       Findings: Patient was found to have normal cystoscopy. No bleeding noted and bladder    Patient had a 2-1/2 x 2 cm prolapsed cervical fibroid. Patient also had D&C with little tissue. Patient had a normal proctosigmoidoscopy. No bleeding noted in rectum.     Lizandro Rubin MD  Date: 8/22/2019  Time: 1:00 PM

## 2019-08-22 NOTE — ANESTHESIA PRE PROCEDURE
pain M79.604, M79.605    Acute renal failure (HCC) N17.9    Hypoalbuminemia E88.09    Heart attack ([address] I21.9    Infected skin ulcer limited to breakdown of skin ([address] L98.491, L08.9    Iron deficiency anemia D50.9    Bandemia D72.825    Multiple superficial wounds with infection T07. Lacinda Sly, L08.9    Severe protein-calorie malnutrition ([address] E43    Morbid obesity ([address] E66.01    Ambulatory dysfunction R26.2    Abdominal pain R10.9    Infected wound T14. 8XXA, L08.9    Nonhealing ulcer of multiple sites of right lower extremity ([address] L97.919    HTN (hypertension) I10    Lymphedema of both lower extremities I89.0    CKD (chronic kidney disease) N18.9    Abnormal uterine bleeding N93.9    Leg ulcer, unspecified laterality, limited to breakdown of skin ([address] L97.901    Enlarged fibroid uterus D25.9       Past Medical History:        Diagnosis Date    SAM (acute kidney injury) ([address] 9/23/2018    Heart attack ([address] 1982    HTN (hypertension) 8/19/2019    Morbid obesity ([address] 9/30/2018    Skin lesions     bilateral legs       Past Surgical History:        Procedure Laterality Date    VT OFFICE/OUTPT VISIT,PROCEDURE ONLY Bilateral 9/24/2018    SHARP EXCISIONAL DEBRIDEMENT BILATERAL LOWER EXTREMITIES ULCERS X4 WITH SCALPEL AND FORCEPS performed by Yisel Mcdaniel DO at [address] [place] [address]       Social History:    Social History     Tobacco Use    Smoking status: Former Smoker    Smokeless tobacco: Former User   Substance Use Topics    Alcohol use:  No                                Counseling given: Not Answered      Vital Signs (Current):   Vitals:    08/21/19 0956 08/21/19 1103 08/21/19 1205 08/21/19 1353   BP: (!) 140/84 (!) 176/71 (!) 174/63 (!) 173/75   Pulse: 74 67 63 60   Resp: 24 24 25 25   Temp: 36.6 °C (97.9 °F) 36.5 °C (97.7 °F) 36.4 °C (97.6 °F) 36.4 °C (97.5 °F)   TempSrc: Oral Oral Oral    SpO2: 98% 93% 97%    Weight:       Height:
HCG, HCGQUANT     ABGs: No results found for: PHART, PO2ART, SHS6FNG, UEL8QEK, BEART, K5SXYSSF     Type & Screen (If Applicable):  No results found for: LABABO, LABRH    Anesthesia Evaluation   no history of anesthetic complications:   Airway:         Dental:          Pulmonary:                             ROS comment: Denies TAMAR   Cardiovascular:    (+) hypertension:, past MI:,                   Neuro/Psych:      (-) seizures and CVA           GI/Hepatic/Renal:             Endo/Other:    (+) hypothyroidism::., .                 Abdominal:           Vascular:                                        Anesthesia Plan      general     ASA 4     (Asa 4)  Induction: intravenous.                           Mroena Zepeda MD   8/22/2019

## 2019-08-22 NOTE — PROGRESS NOTES
elevation likely secondary to demand ischemia and SAM   - Cardiology recommends Hgb >9, plan for outpatient follow up in 1 week     CKD (Stage 3)              - Her Cr on admission is relatively good compared to prior admissions              - Cr 1.26 this am (1.37 on admission)              - Previously 1.66 in October and as high as 3.3 in September 2018     Hx of questionable GI bleeding (9/2018)              - GI planned outpatient follow up at that time              - Appt in Nov 2019 stated \"Plan for elective EGD and Colonoscopy in 6 months, will allow surgical wounds to heal.  RTC 5 months\"        Morbid obesity              - BMI 62.4              - PT consulted, but patient unable to ambulate secondary to lymphedema and nonhealing ulcers   - OT on board     Poor historian      Principal Problem:    Nonhealing ulcer of multiple sites of right lower extremity ([address]  Active Problems:    Abnormal uterine bleeding    Enlarged fibroid uterus    Blood loss anemia    Bilateral leg pain    Iron deficiency anemia    Morbid obesity ([address]    Infected wound    HTN (hypertension)    Lymphedema of both lower extremities    CKD (chronic kidney disease)    Leg ulcer, unspecified laterality, limited to breakdown of skin ([address]  Resolved Problems:    * No resolved hospital problems. *      Attending: Dr. Analia Gomez      Please page the resident named below with questions and concerns. Joshua Macdonald DO  OBGYN Resident  Pgr: [phone]  8/22/2019, 8:34 AM     GYN oncology attending note:    Agree with resident's assessment of patient's situation and data as above. Patient seen and evaluated today with residency staff. Patient was consented for surgery to consist of pelvic examination under anesthesia, cystoscopy, endometrial biopsy, proctosigmoidoscopy, D&C. Questions were answered to her satisfaction. Granddaughter was present.

## 2019-08-22 NOTE — PROGRESS NOTES
BP Temp Temp src Pulse Resp   08/22/19 0415 (!) 144/68 98 °F (36.7 °C) Oral 58 19     General Appearance: Awake, alert, and in no apparent distress  Head:  Normocephalic, no trauma  Eyes: Pupils equal, round, reactive to light and accommodation; extraocular movements intact; sclera anicteric; conjunctivae pink. No embolic phenomena. ENT: Oropharynx clear, without erythema, exudate, or thrush. No tenderness of sinuses. Mouth/throat: mucosa pink and moist. No lesions. Dentition in good repair. Neck:Supple, without lymphadenopathy. Thyroid normal, No bruits. Pulmonary/Chest: Clear to auscultation, without wheezes, rales, or rhonchi. No dullness to percussion. Cardiovascular: Regular rate and rhythm without murmurs, rubs, or gallops. Abdomen: Soft, non tender. Bowel sounds normal. No organomegaly  All four Extremities: No cyanosis, clubbing, edema, or effusions. Neurologic: No gross sensory or motor deficits. Skin: BLE chronic wounds with serous drainage    Medical Decision Making -Laboratory:   I have independently reviewed/ordered the following labs:    CBC with Differential:   Recent Labs     08/21/19 0458 08/21/19 1959 08/22/19  0555   WBC 9.2  --  10.2   HGB 6.9* 7.7* 7.7*   HCT 25.8* 28.9* 30.3*     --  259   LYMPHOPCT 10*  --  PENDING   MONOPCT 5  --  PENDING     BMP:   Recent Labs     08/21/19 0458 08/22/19  0555    139   K 3.9 4.6    101   CO2 26 26   BUN 16 19   CREATININE 1.26* 1.65*     Hepatic Function Panel: No results for input(s): PROT, LABALBU, BILIDIR, IBILI, BILITOT, ALKPHOS, ALT, AST in the last 72 hours. No results for input(s): RPR in the last 72 hours. No results for input(s): HIV in the last 72 hours. No results for input(s): BC in the last 72 hours.   Lab Results   Component Value Date    MUCUS NOT REPORTED 08/20/2019    RBC 3.68 08/22/2019    TRICHOMONAS NOT REPORTED 08/20/2019    WBC 10.2 08/22/2019    YEAST NOT REPORTED 08/20/2019    TURBIDITY CLOUDY

## 2019-08-22 NOTE — PROGRESS NOTES
never attained menopause.  On review of charts, there is a concern for fibroids in uteri on ultrasound retroperitoneal in 2018 which was never followed up. OB/GYN consulted, pelvic ultrasound on  showed multiple fibroids. Plan for EUA, and endometrial biopsy on 2019. OBJECTIVE     Vital Signs:  BP (!) 154/66   Pulse 60   Temp 97.9 °F (36.6 °C) (Temporal)   Resp 20   Ht 5' 3\" (1.6 m) Comment: from floor  Wt (!) 351 lb (159.2 kg) Comment: from floor  LMP 2019   SpO2 100%   BMI 62.18 kg/m²     Temp (24hrs), Av.6 °F (35.9 °C), Min:96 °F (35.6 °C), Max:98.2 °F (36.8 °C)    In: 200   Out: 250 [Urine:250]    Physical Exam:  Constitutional: This is a well developed, well nourished, Greater than 40 - Morbid Obesity / Extreme Obesity / Grade III 77y.o. year old female who is alert, oriented, cooperative and in no apparent distress. Head:normocephalic and atraumatic. EENT:  PERRLA. No conjunctival injections. Neck: Supple without thyromegaly. No elevated JVP. Trachea was midline. Respiratory: Breath sounds bilaterally were clear to auscultation. There were no wheezes, rhonchi or rales. Cardiovascular: Regular without murmur, clicks, gallops or rubs. Abdomen: Slightly rounded and soft without organomegaly. No rebound, rigidity or guarding was appreciated. Extremities:  No lower extremity edema, ulcerations, tenderness, varicosities or erythema. Muscle size, tone and strength are normal.   Skin:  Warm and dry.  No cyanosis or clubbing  Neurological/Psychiatric: The patient's general behavior, level of consciousness, thought content and emotional status is normal.        Medications:  Scheduled Medications:    [MAR Hold] atorvastatin  10 mg Oral Daily    [MAR Hold] cloNIDine  0.1 mg Oral BID    [MAR Hold] levothyroxine  75 mcg Oral Daily    [MAR Hold] sodium chloride  250 mL Intravenous Once    iron sucrose  200 mg Intravenous Q24H    [MAR Hold] amLODIPine  10 mg Oral assessment, plan and orders as documented by the resident.       Noé Mejia MD, MALATHI, [address]  Attending Physician, Internal Medicine Service    Internal Medicine Residency Program  8/22/2019, 5:24 PM

## 2019-08-23 LAB
ABO/RH: NORMAL
ABSOLUTE EOS #: 0.12 K/UL (ref 0–0.44)
ABSOLUTE IMMATURE GRANULOCYTE: 0.12 K/UL (ref 0–0.3)
ABSOLUTE LYMPH #: 0.81 K/UL (ref 1.1–3.7)
ABSOLUTE MONO #: 0.69 K/UL (ref 0.1–1.2)
ANION GAP SERPL CALCULATED.3IONS-SCNC: 12 MMOL/L (ref 9–17)
ANTIBODY SCREEN: NEGATIVE
ARM BAND NUMBER: NORMAL
BASOPHILS # BLD: 1 % (ref 0–2)
BASOPHILS ABSOLUTE: 0.12 K/UL (ref 0–0.2)
BLD PROD TYP BPU: NORMAL
BUN BLDV-MCNC: 27 MG/DL (ref 8–23)
BUN/CREAT BLD: ABNORMAL (ref 9–20)
CALCIUM SERPL-MCNC: 8.6 MG/DL (ref 8.6–10.4)
CHLORIDE BLD-SCNC: 96 MMOL/L (ref 98–107)
CO2: 27 MMOL/L (ref 20–31)
CREAT SERPL-MCNC: 2.07 MG/DL (ref 0.5–0.9)
CROSSMATCH RESULT: NORMAL
CYTOLOGY REPORT: NORMAL
DIFFERENTIAL TYPE: ABNORMAL
DISPENSE STATUS BLOOD BANK: NORMAL
EOSINOPHILS RELATIVE PERCENT: 1 % (ref 1–4)
EXPIRATION DATE: NORMAL
GFR AFRICAN AMERICAN: 29 ML/MIN
GFR NON-AFRICAN AMERICAN: 24 ML/MIN
GFR SERPL CREATININE-BSD FRML MDRD: ABNORMAL ML/MIN/{1.73_M2}
GFR SERPL CREATININE-BSD FRML MDRD: ABNORMAL ML/MIN/{1.73_M2}
GLUCOSE BLD-MCNC: 106 MG/DL (ref 70–99)
HCT VFR BLD CALC: 30.5 % (ref 36.3–47.1)
HEMOGLOBIN: 8.4 G/DL (ref 11.9–15.1)
IMMATURE GRANULOCYTES: 1 %
INTERVENTION: NORMAL
LYMPHOCYTES # BLD: 7 % (ref 24–43)
MCH RBC QN AUTO: 23 PG (ref 25.2–33.5)
MCHC RBC AUTO-ENTMCNC: 27.5 G/DL (ref 28.4–34.8)
MCV RBC AUTO: 83.3 FL (ref 82.6–102.9)
MONOCYTES # BLD: 6 % (ref 3–12)
MORPHOLOGY: ABNORMAL
NRBC AUTOMATED: 0.6 PER 100 WBC
PDW BLD-RTO: 19.7 % (ref 11.8–14.4)
PLATELET # BLD: 248 K/UL (ref 138–453)
PLATELET ESTIMATE: ABNORMAL
PMV BLD AUTO: 10.6 FL (ref 8.1–13.5)
POTASSIUM SERPL-SCNC: 4.8 MMOL/L (ref 3.7–5.3)
RBC # BLD: 3.66 M/UL (ref 3.95–5.11)
RBC # BLD: ABNORMAL 10*6/UL
SEG NEUTROPHILS: 84 % (ref 36–65)
SEGMENTED NEUTROPHILS ABSOLUTE COUNT: 9.64 K/UL (ref 1.5–8.1)
SODIUM BLD-SCNC: 135 MMOL/L (ref 135–144)
TRANSFUSION STATUS: NORMAL
UNIT DIVISION: 0
UNIT NUMBER: NORMAL
WBC # BLD: 11.5 K/UL (ref 3.5–11.3)
WBC # BLD: ABNORMAL 10*3/UL

## 2019-08-23 PROCEDURE — 0HBKXZX EXCISION OF RIGHT LOWER LEG SKIN, EXTERNAL APPROACH, DIAGNOSTIC: ICD-10-PCS | Performed by: INTERNAL MEDICINE

## 2019-08-23 PROCEDURE — 2580000003 HC RX 258: Performed by: STUDENT IN AN ORGANIZED HEALTH CARE EDUCATION/TRAINING PROGRAM

## 2019-08-23 PROCEDURE — 6370000000 HC RX 637 (ALT 250 FOR IP): Performed by: STUDENT IN AN ORGANIZED HEALTH CARE EDUCATION/TRAINING PROGRAM

## 2019-08-23 PROCEDURE — 99232 SBSQ HOSP IP/OBS MODERATE 35: CPT | Performed by: INTERNAL MEDICINE

## 2019-08-23 PROCEDURE — 36415 COLL VENOUS BLD VENIPUNCTURE: CPT

## 2019-08-23 PROCEDURE — 97535 SELF CARE MNGMENT TRAINING: CPT

## 2019-08-23 PROCEDURE — 6360000002 HC RX W HCPCS: Performed by: STUDENT IN AN ORGANIZED HEALTH CARE EDUCATION/TRAINING PROGRAM

## 2019-08-23 PROCEDURE — 94640 AIRWAY INHALATION TREATMENT: CPT

## 2019-08-23 PROCEDURE — 87075 CULTR BACTERIA EXCEPT BLOOD: CPT

## 2019-08-23 PROCEDURE — 1200000000 HC SEMI PRIVATE

## 2019-08-23 PROCEDURE — 87205 SMEAR GRAM STAIN: CPT

## 2019-08-23 PROCEDURE — 2500000003 HC RX 250 WO HCPCS: Performed by: STUDENT IN AN ORGANIZED HEALTH CARE EDUCATION/TRAINING PROGRAM

## 2019-08-23 PROCEDURE — 80048 BASIC METABOLIC PNL TOTAL CA: CPT

## 2019-08-23 PROCEDURE — 87070 CULTURE OTHR SPECIMN AEROBIC: CPT

## 2019-08-23 PROCEDURE — 2700000000 HC OXYGEN THERAPY PER DAY

## 2019-08-23 PROCEDURE — 85025 COMPLETE CBC W/AUTO DIFF WBC: CPT

## 2019-08-23 PROCEDURE — 97530 THERAPEUTIC ACTIVITIES: CPT

## 2019-08-23 PROCEDURE — 86403 PARTICLE AGGLUT ANTBDY SCRN: CPT

## 2019-08-23 RX ORDER — OXYCODONE HYDROCHLORIDE 5 MG/1
5 TABLET ORAL EVERY 6 HOURS PRN
Status: DISCONTINUED | OUTPATIENT
Start: 2019-08-23 | End: 2019-08-24

## 2019-08-23 RX ORDER — HEPARIN SODIUM 5000 [USP'U]/ML
5000 INJECTION, SOLUTION INTRAVENOUS; SUBCUTANEOUS EVERY 8 HOURS SCHEDULED
Status: DISCONTINUED | OUTPATIENT
Start: 2019-08-23 | End: 2019-08-27 | Stop reason: HOSPADM

## 2019-08-23 RX ORDER — LIDOCAINE HYDROCHLORIDE 20 MG/ML
10 INJECTION, SOLUTION EPIDURAL; INFILTRATION; INTRACAUDAL; PERINEURAL ONCE
Status: COMPLETED | OUTPATIENT
Start: 2019-08-23 | End: 2019-08-23

## 2019-08-23 RX ORDER — SODIUM CHLORIDE 9 MG/ML
INJECTION, SOLUTION INTRAVENOUS CONTINUOUS
Status: DISCONTINUED | OUTPATIENT
Start: 2019-08-23 | End: 2019-08-24

## 2019-08-23 RX ORDER — OXYCODONE HYDROCHLORIDE 5 MG/1
10 TABLET ORAL EVERY 6 HOURS PRN
Status: DISCONTINUED | OUTPATIENT
Start: 2019-08-23 | End: 2019-08-23

## 2019-08-23 RX ORDER — GABAPENTIN 300 MG/1
300 CAPSULE ORAL 2 TIMES DAILY
Status: DISCONTINUED | OUTPATIENT
Start: 2019-08-23 | End: 2019-08-27 | Stop reason: HOSPADM

## 2019-08-23 RX ADMIN — FERROUS SULFATE TAB EC 325 MG (65 MG FE EQUIVALENT) 325 MG: 325 (65 FE) TABLET DELAYED RESPONSE at 17:55

## 2019-08-23 RX ADMIN — ALLOPURINOL 100 MG: 100 TABLET ORAL at 10:33

## 2019-08-23 RX ADMIN — FERROUS SULFATE TAB EC 325 MG (65 MG FE EQUIVALENT) 325 MG: 325 (65 FE) TABLET DELAYED RESPONSE at 10:33

## 2019-08-23 RX ADMIN — PANTOPRAZOLE SODIUM 40 MG: 40 TABLET, DELAYED RELEASE ORAL at 10:33

## 2019-08-23 RX ADMIN — CARVEDILOL 25 MG: 12.5 TABLET, FILM COATED ORAL at 10:33

## 2019-08-23 RX ADMIN — ATORVASTATIN CALCIUM 10 MG: 10 TABLET, FILM COATED ORAL at 10:35

## 2019-08-23 RX ADMIN — GABAPENTIN 300 MG: 300 CAPSULE ORAL at 20:43

## 2019-08-23 RX ADMIN — OXYCODONE HYDROCHLORIDE 10 MG: 5 TABLET ORAL at 04:45

## 2019-08-23 RX ADMIN — ALBUTEROL SULFATE 2.5 MG: 2.5 SOLUTION RESPIRATORY (INHALATION) at 08:38

## 2019-08-23 RX ADMIN — CLONIDINE HYDROCHLORIDE 0.1 MG: 0.1 TABLET ORAL at 20:52

## 2019-08-23 RX ADMIN — Medication 10 ML: at 10:35

## 2019-08-23 RX ADMIN — CLONIDINE HYDROCHLORIDE 0.1 MG: 0.1 TABLET ORAL at 10:34

## 2019-08-23 RX ADMIN — HYDRALAZINE HYDROCHLORIDE 10 MG: 20 INJECTION INTRAMUSCULAR; INTRAVENOUS at 00:22

## 2019-08-23 RX ADMIN — Medication 10 ML: at 20:44

## 2019-08-23 RX ADMIN — GABAPENTIN 300 MG: 300 CAPSULE ORAL at 10:34

## 2019-08-23 RX ADMIN — CARVEDILOL 25 MG: 12.5 TABLET, FILM COATED ORAL at 17:55

## 2019-08-23 RX ADMIN — HEPARIN SODIUM 5000 UNITS: 5000 INJECTION INTRAVENOUS; SUBCUTANEOUS at 20:43

## 2019-08-23 RX ADMIN — ALBUTEROL SULFATE 2.5 MG: 2.5 SOLUTION RESPIRATORY (INHALATION) at 13:27

## 2019-08-23 RX ADMIN — LEVOTHYROXINE SODIUM 75 MCG: 75 TABLET ORAL at 10:34

## 2019-08-23 RX ADMIN — LIDOCAINE HYDROCHLORIDE 10 ML: 20 INJECTION, SOLUTION EPIDURAL; INFILTRATION; INTRACAUDAL; PERINEURAL at 10:50

## 2019-08-23 RX ADMIN — AMLODIPINE BESYLATE 10 MG: 10 TABLET ORAL at 10:33

## 2019-08-23 RX ADMIN — HEPARIN SODIUM 5000 UNITS: 5000 INJECTION INTRAVENOUS; SUBCUTANEOUS at 14:57

## 2019-08-23 RX ADMIN — ALBUTEROL SULFATE 2.5 MG: 2.5 SOLUTION RESPIRATORY (INHALATION) at 17:07

## 2019-08-23 RX ADMIN — SODIUM CHLORIDE: 9 INJECTION, SOLUTION INTRAVENOUS at 10:32

## 2019-08-23 ASSESSMENT — PAIN SCALES - GENERAL: PAINLEVEL_OUTOF10: 7

## 2019-08-23 NOTE — OP NOTE
placed over these areas. A single suture was placed  in the tenaculum site. Surgicel gauze was used to pack the cervix. We turned our attention then to the proctosigmoidoscopy. The [email or web address] see-through plastic disposable scope was then placed, and  proctosigmoidoscopy was performed up to 15 cm. No lesions were seen. There was no blood in the rectum. A rectovaginal examination was then performed, and the uterus was felt  to be enlarged approximately 3-1/2 months pregnancy size. There were no  rectal lesions palpated as far as the finger could probe. There was no  nodularity noted in the cul-de-sac, and the uterus was free and mobile. No adnexal pathology was noted. At this point, the patient was awakened and taken to the recovery room  in satisfactory condition. Blood loss was about 10 mL.         Tara Bill MD    D: 08/22/2019 13:24:57       T: 08/22/2019 14:40:01     [id number]  Job#: [id number]     Doc#: [id number]    CC:

## 2019-08-23 NOTE — PROGRESS NOTES
reports that she never attained menopause.  On review of charts, there is a concern for fibroids in uteri on ultrasound retroperitoneal in 2018 which was never followed up. OB/GYN consulted, pelvic ultrasound on  showed multiple fibroids. EUA, cervical myomectomy and endometrial biopsy on 2019. OBJECTIVE     Vital Signs:  /63   Pulse 60   Temp 98.4 °F (36.9 °C) (Oral)   Resp 20   Ht 5' 3\" (1.6 m) Comment: from floor  Wt (!) 351 lb (159.2 kg) Comment: from floor  LMP 2019   SpO2 100%   BMI 62.18 kg/m²     Temp (24hrs), Av.2 °F (35.7 °C), Min:95.8 °F (35.4 °C), Max:98.9 °F (37.2 °C)    In: 130   Out: 550 [Urine:550]    Physical Exam:  Constitutional: This is a well developed, well nourished, Greater than 40 - Morbid Obesity / Extreme Obesity / Grade III 77y.o. year old female who is alert, oriented, cooperative and in no apparent distress. Respiratory: Breath sounds bilaterally were clear to auscultation. There were no wheezes, rhonchi or rales. Cardiovascular: Regular without murmur, clicks, gallops or rubs. Abdomen: Slightly rounded and soft without organomegaly. No rebound, rigidity or guarding was appreciated. Extremities: Bilateral lower extremity nonhealing ulcers covered with dressing.   Neurological/Psychiatric: The patient's general behavior, level of consciousness, thought content and emotional status is normal.        Medications:  Scheduled Medications:    atorvastatin  10 mg Oral Daily    cloNIDine  0.1 mg Oral BID    levothyroxine  75 mcg Oral Daily    allopurinol  100 mg Oral Daily    gabapentin  300 mg Oral TID    ferrous sulfate  325 mg Oral BID WC    albuterol  2.5 mg Nebulization 4x daily    amLODIPine  10 mg Oral Daily    sodium chloride flush  10 mL Intravenous 2 times per day    carvedilol  25 mg Oral BID WC    pantoprazole  40 mg Oral Daily     Continuous Infusions:    sodium chloride 100 mL/hr at 19 1032     PRN 1. Nonhealing ulcers on bilateral lower extremities: Dressing changes every other day by wound care. Plan for skin biopsy to rule out pyoderma gangrenosum by ID today. 2. Acute blood loss anemia: secondary to#3: Hb this a.m. is  8.6. H&H daily. 3. Menorrhagia: Pelvic ultrasound showed multiple fibroids, larger being 3 x 2 x 3 cm. Examination under anesthesia, endometrial biopsy today ancillary myomectomy performed yesterday. Follow-up with surgical path. 4. Hypertension:  Uncontrolled. SBP is 140-160.  Continue Coreg 25 twice daily and Norvasc 10 mg daily. Resume clonidine twice daily. Continue low-salt diet. 5. SAM on CKD stage III: Creatinine this a.m. is 2.07. Start IV fluids at 100 mL/h. Discontinue Lasix. Continue to monitor urine output and BMP tomorrow a.m. 6. Hypothyroidism:  Stable. Resume Synthroid 75 mcg OD. 7. Morbid obesity  8. Bilateral lower extremity lymphedema    DVT ppx : Start heparin 5000 units subcu and continue EPC cuffs  GI ppx: Protonix 40 mg daily    PT/OT: Patient unable to ambulate. Encourage sitting in chair. PT and OT working with the patient. Discharge Planning / SW: Remains inpatient. Lachelle Garza MD  Internal Medicine Resident, PGY-1  [address], [place]  8/23/2019, 11:50 AM        Attending Physician Statement  I have discussed the case, including pertinent history and exam findings with the resident and the team.  I have seen and examined the patient and the key elements of the encounter have been performed by me. I agree with the assessment, plan and orders as documented by the resident.         Noé Denson MD, MALATHI, [address]  Attending Physician, Internal Medicine Service    Internal Medicine Residency Program  8/23/2019, 4:33 PM

## 2019-08-23 NOTE — PROGRESS NOTES
Physical Therapy  Facility/Department: [facility]  Daily Treatment Note  NAME: Marsha Marie  : 1950  MRN: [id number]    Date of Service: 2019    Discharge Recommendations:  Patient would benefit from continued therapy after discharge   PT Equipment Recommendations  Equipment Needed: No    Assessment   Body structures, Functions, Activity limitations: Decreased functional mobility ; Decreased endurance;Decreased strength;Decreased balance;Decreased safe awareness; Increased Pain  Assessment: Pt sat EOB with MOD Ax2, and MAX Ax2 to return to supine. Unsafe to return to prior living environment due to fall risk. WOuld benefit from continued PT after d/c. Prognosis: Good  REQUIRES PT FOLLOW UP: Yes  Activity Tolerance  Activity Tolerance: Patient limited by fatigue;Patient limited by pain; Patient limited by endurance     Patient Diagnosis(es): The encounter diagnosis was Multiple open wounds of lower extremity, right, initial encounter. has a past medical history of SAM (acute kidney injury) ([facility] [place] [address] Heart attack ([facility] [place] [address] HTN (hypertension), Morbid obesity ([facility] [place] [address] and Skin lesions. has a past surgical history that includes Tubal ligation (); pr office/outpt visit,procedure only (Bilateral, 2018); Dilation and curettage of uterus (2019); myomectomy (2019); sigmoidoscopy (2019); Endometrial biopsy (2019); Dilation and curettage of uterus (N/A, 2019); and Cervix biopsy (N/A, 2019). Restrictions  Restrictions/Precautions  Restrictions/Precautions: General Precautions, Fall Risk  Required Braces or Orthoses?: No  Subjective   General  Response To Previous Treatment: Patient with no complaints from previous session. Family / Caregiver Present: Yes  Subjective  Subjective: Pt very lethargic upon arrival, agreeable to PT once awoken.    Pain Screening  Patient Currently in Pain: Denies  Vital Signs  Patient Currently in Pain: Denies Orientation  Orientation  Overall Orientation Status: Within Functional Limits(WFL, but slow to respond)  Cognition      Objective   Bed mobility  Rolling to Left: Minimal assistance  Rolling to Right: Minimal assistance  Supine to Sit: Moderate assistance;2 Person assistance  Sit to Supine: Maximum assistance;2 Person assistance  Comment: Increased time to complete due SOB and body habitus  Transfers  Sit to Stand: Moderate Assistance  Comment: Pt stood x <10 seconds at bedside, holding onto bedside railing  Ambulation  Ambulation?: No     Balance  Posture: Fair  Sitting - Static: Good  Sitting - Dynamic: Fair  Standing - Static: Fair;-    Exercise  Seated marching in place, kicks, ankle pumps x 5        Goals  Short term goals  Time Frame for Short term goals: 14 visits   Short term goal 1: Pt will be able to completed bed mobility SBA. Short term goal 2: Pt will completed all transfers Min Ax1. Short term goal 3: Pt will ambulate 15 ft within the room using RW Min Ax1. Short term goal 4: Pt will be able to tolerate 45 minutes of activity in order to improve strength and endurance. Short term goal 5: Pt will improve static standing balance to Good-. Patient Goals   Patient goals : To return home;  To walk    Plan    Plan  Times per week: 5-6x/wk  Times per day: Daily  Current Treatment Recommendations: Strengthening, Gait Training, Balance Training, Functional Mobility Training, Endurance Training, Transfer Training, Safety Education & Training, Patient/Caregiver Education & Training  Safety Devices  Type of devices: Nurse notified, Call light within reach, Patient at risk for falls, Gait belt, Left in bed  Restraints  Initially in place: No     Therapy Time   Individual Concurrent Group Co-treatment   Time In 1140         Time Out 1208         Minutes [address]

## 2019-08-23 NOTE — PROGRESS NOTES
of the patient's status as above. The patient's surgery from yesterday was discussed in full with the patient along with her granddaughter \"Rusty\". All questions were answered to their satisfaction. We are awaiting the results of the biopsies. Resume preoperative medical care as before. I will follow from a distance at this point until biopsy results are available.     Bola Jauregui MD    [facility] gynecologic oncology

## 2019-08-24 ENCOUNTER — APPOINTMENT (OUTPATIENT)
Dept: ULTRASOUND IMAGING | Age: 69
DRG: 744 | End: 2019-08-24
Payer: MEDICARE

## 2019-08-24 LAB
-: ABNORMAL
ABSOLUTE EOS #: 0.1 K/UL (ref 0–0.44)
ABSOLUTE IMMATURE GRANULOCYTE: 0.1 K/UL (ref 0–0.3)
ABSOLUTE LYMPH #: 0.78 K/UL (ref 1.1–3.7)
ABSOLUTE MONO #: 0.87 K/UL (ref 0.1–1.2)
AMORPHOUS: ABNORMAL
ANION GAP SERPL CALCULATED.3IONS-SCNC: 13 MMOL/L (ref 9–17)
BACTERIA: ABNORMAL
BASOPHILS # BLD: 0 % (ref 0–2)
BASOPHILS ABSOLUTE: 0 K/UL (ref 0–0.2)
BILIRUBIN URINE: ABNORMAL
BUN BLDV-MCNC: 35 MG/DL (ref 8–23)
BUN/CREAT BLD: ABNORMAL (ref 9–20)
CALCIUM SERPL-MCNC: 8.1 MG/DL (ref 8.6–10.4)
CASTS UA: ABNORMAL /LPF (ref 0–8)
CHLORIDE BLD-SCNC: 99 MMOL/L (ref 98–107)
CO2: 24 MMOL/L (ref 20–31)
COLOR: ABNORMAL
COMPLEMENT C3: 126 MG/DL (ref 90–180)
COMPLEMENT C4: 19 MG/DL (ref 10–40)
CREAT SERPL-MCNC: 3.21 MG/DL (ref 0.5–0.9)
CRYSTALS, UA: ABNORMAL /HPF
DIFFERENTIAL TYPE: ABNORMAL
EOSINOPHILS RELATIVE PERCENT: 1 % (ref 1–4)
EPITHELIAL CELLS UA: ABNORMAL /HPF (ref 0–5)
FREE KAPPA/LAMBDA RATIO: 2.29 (ref 0.26–1.65)
GFR AFRICAN AMERICAN: 17 ML/MIN
GFR NON-AFRICAN AMERICAN: 14 ML/MIN
GFR SERPL CREATININE-BSD FRML MDRD: ABNORMAL ML/MIN/{1.73_M2}
GFR SERPL CREATININE-BSD FRML MDRD: ABNORMAL ML/MIN/{1.73_M2}
GLUCOSE BLD-MCNC: 105 MG/DL (ref 70–99)
GLUCOSE URINE: NEGATIVE
HCT VFR BLD CALC: 32.3 % (ref 36.3–47.1)
HEMOGLOBIN: 8.2 G/DL (ref 11.9–15.1)
IMMATURE GRANULOCYTES: 1 %
KAPPA FREE LIGHT CHAINS QNT: 19.91 MG/DL (ref 0.37–1.94)
KETONES, URINE: NEGATIVE
LAMBDA FREE LIGHT CHAINS QNT: 8.7 MG/DL (ref 0.57–2.63)
LEUKOCYTE ESTERASE, URINE: ABNORMAL
LYMPHOCYTES # BLD: 8 % (ref 24–43)
MCH RBC QN AUTO: 23 PG (ref 25.2–33.5)
MCHC RBC AUTO-ENTMCNC: 25.4 G/DL (ref 28.4–34.8)
MCV RBC AUTO: 90.7 FL (ref 82.6–102.9)
MONOCYTES # BLD: 9 % (ref 3–12)
MORPHOLOGY: ABNORMAL
MUCUS: ABNORMAL
NITRITE, URINE: NEGATIVE
NRBC AUTOMATED: 0.3 PER 100 WBC
OTHER OBSERVATIONS UA: ABNORMAL
PDW BLD-RTO: 20.9 % (ref 11.8–14.4)
PH UA: 5 (ref 5–8)
PLATELET # BLD: 182 K/UL (ref 138–453)
PLATELET ESTIMATE: ABNORMAL
PMV BLD AUTO: 11 FL (ref 8.1–13.5)
POTASSIUM SERPL-SCNC: 5 MMOL/L (ref 3.7–5.3)
PROTEIN UA: ABNORMAL
RBC # BLD: 3.56 M/UL (ref 3.95–5.11)
RBC # BLD: ABNORMAL 10*6/UL
RBC UA: ABNORMAL /HPF (ref 0–4)
RENAL EPITHELIAL, UA: ABNORMAL /HPF
SEG NEUTROPHILS: 81 % (ref 36–65)
SEGMENTED NEUTROPHILS ABSOLUTE COUNT: 7.85 K/UL (ref 1.5–8.1)
SODIUM BLD-SCNC: 136 MMOL/L (ref 135–144)
SODIUM,UR: <20 MMOL/L
SPECIFIC GRAVITY UA: 1.02 (ref 1–1.03)
TRICHOMONAS: ABNORMAL
TURBIDITY: ABNORMAL
URINE HGB: ABNORMAL
UROBILINOGEN, URINE: NORMAL
WBC # BLD: 9.7 K/UL (ref 3.5–11.3)
WBC # BLD: ABNORMAL 10*3/UL
WBC UA: ABNORMAL /HPF (ref 0–5)
YEAST: ABNORMAL

## 2019-08-24 PROCEDURE — 99232 SBSQ HOSP IP/OBS MODERATE 35: CPT | Performed by: INTERNAL MEDICINE

## 2019-08-24 PROCEDURE — 94640 AIRWAY INHALATION TREATMENT: CPT

## 2019-08-24 PROCEDURE — 6360000002 HC RX W HCPCS: Performed by: STUDENT IN AN ORGANIZED HEALTH CARE EDUCATION/TRAINING PROGRAM

## 2019-08-24 PROCEDURE — 6370000000 HC RX 637 (ALT 250 FOR IP): Performed by: STUDENT IN AN ORGANIZED HEALTH CARE EDUCATION/TRAINING PROGRAM

## 2019-08-24 PROCEDURE — 2580000003 HC RX 258: Performed by: STUDENT IN AN ORGANIZED HEALTH CARE EDUCATION/TRAINING PROGRAM

## 2019-08-24 PROCEDURE — 83516 IMMUNOASSAY NONANTIBODY: CPT

## 2019-08-24 PROCEDURE — 84300 ASSAY OF URINE SODIUM: CPT

## 2019-08-24 PROCEDURE — 51798 US URINE CAPACITY MEASURE: CPT

## 2019-08-24 PROCEDURE — 87205 SMEAR GRAM STAIN: CPT

## 2019-08-24 PROCEDURE — 84156 ASSAY OF PROTEIN URINE: CPT

## 2019-08-24 PROCEDURE — 2700000000 HC OXYGEN THERAPY PER DAY

## 2019-08-24 PROCEDURE — 86038 ANTINUCLEAR ANTIBODIES: CPT

## 2019-08-24 PROCEDURE — 83883 ASSAY NEPHELOMETRY NOT SPEC: CPT

## 2019-08-24 PROCEDURE — 80048 BASIC METABOLIC PNL TOTAL CA: CPT

## 2019-08-24 PROCEDURE — 36415 COLL VENOUS BLD VENIPUNCTURE: CPT

## 2019-08-24 PROCEDURE — 81001 URINALYSIS AUTO W/SCOPE: CPT

## 2019-08-24 PROCEDURE — 1200000000 HC SEMI PRIVATE

## 2019-08-24 PROCEDURE — 86160 COMPLEMENT ANTIGEN: CPT

## 2019-08-24 PROCEDURE — 86225 DNA ANTIBODY NATIVE: CPT

## 2019-08-24 PROCEDURE — 76770 US EXAM ABDO BACK WALL COMP: CPT

## 2019-08-24 PROCEDURE — 85025 COMPLETE CBC W/AUTO DIFF WBC: CPT

## 2019-08-24 PROCEDURE — 86235 NUCLEAR ANTIGEN ANTIBODY: CPT

## 2019-08-24 PROCEDURE — 82570 ASSAY OF URINE CREATININE: CPT

## 2019-08-24 RX ORDER — ALLOPURINOL 100 MG/1
50 TABLET ORAL DAILY
Status: DISCONTINUED | OUTPATIENT
Start: 2019-08-24 | End: 2019-08-27 | Stop reason: HOSPADM

## 2019-08-24 RX ORDER — ALBUTEROL SULFATE 2.5 MG/3ML
2.5 SOLUTION RESPIRATORY (INHALATION) 3 TIMES DAILY
Status: DISCONTINUED | OUTPATIENT
Start: 2019-08-25 | End: 2019-08-26

## 2019-08-24 RX ORDER — MAGNESIUM HYDROXIDE 1200 MG/15ML
LIQUID ORAL
Status: DISPENSED
Start: 2019-08-24 | End: 2019-08-24

## 2019-08-24 RX ORDER — ALBUTEROL SULFATE 2.5 MG/3ML
2.5 SOLUTION RESPIRATORY (INHALATION) EVERY 6 HOURS PRN
Status: DISCONTINUED | OUTPATIENT
Start: 2019-08-24 | End: 2019-08-27 | Stop reason: HOSPADM

## 2019-08-24 RX ORDER — 0.9 % SODIUM CHLORIDE 0.9 %
250 INTRAVENOUS SOLUTION INTRAVENOUS ONCE
Status: COMPLETED | OUTPATIENT
Start: 2019-08-24 | End: 2019-08-24

## 2019-08-24 RX ADMIN — CARVEDILOL 25 MG: 12.5 TABLET, FILM COATED ORAL at 17:27

## 2019-08-24 RX ADMIN — SODIUM CHLORIDE 250 ML: 9 INJECTION, SOLUTION INTRAVENOUS at 18:14

## 2019-08-24 RX ADMIN — Medication 10 ML: at 22:34

## 2019-08-24 RX ADMIN — FERROUS SULFATE TAB EC 325 MG (65 MG FE EQUIVALENT) 325 MG: 325 (65 FE) TABLET DELAYED RESPONSE at 17:27

## 2019-08-24 RX ADMIN — CLONIDINE HYDROCHLORIDE 0.1 MG: 0.1 TABLET ORAL at 08:55

## 2019-08-24 RX ADMIN — ALBUTEROL SULFATE 2.5 MG: 2.5 SOLUTION RESPIRATORY (INHALATION) at 07:42

## 2019-08-24 RX ADMIN — CARVEDILOL 25 MG: 12.5 TABLET, FILM COATED ORAL at 08:56

## 2019-08-24 RX ADMIN — AMLODIPINE BESYLATE 10 MG: 10 TABLET ORAL at 08:56

## 2019-08-24 RX ADMIN — ATORVASTATIN CALCIUM 10 MG: 10 TABLET, FILM COATED ORAL at 08:56

## 2019-08-24 RX ADMIN — ALLOPURINOL 50 MG: 100 TABLET ORAL at 08:56

## 2019-08-24 RX ADMIN — HEPARIN SODIUM 5000 UNITS: 5000 INJECTION INTRAVENOUS; SUBCUTANEOUS at 05:58

## 2019-08-24 RX ADMIN — ALBUTEROL SULFATE 2.5 MG: 2.5 SOLUTION RESPIRATORY (INHALATION) at 15:41

## 2019-08-24 RX ADMIN — FERROUS SULFATE TAB EC 325 MG (65 MG FE EQUIVALENT) 325 MG: 325 (65 FE) TABLET DELAYED RESPONSE at 08:55

## 2019-08-24 RX ADMIN — HEPARIN SODIUM 5000 UNITS: 5000 INJECTION INTRAVENOUS; SUBCUTANEOUS at 13:31

## 2019-08-24 RX ADMIN — LEVOTHYROXINE SODIUM 75 MCG: 75 TABLET ORAL at 06:49

## 2019-08-24 RX ADMIN — GABAPENTIN 300 MG: 300 CAPSULE ORAL at 22:15

## 2019-08-24 RX ADMIN — HEPARIN SODIUM 5000 UNITS: 5000 INJECTION INTRAVENOUS; SUBCUTANEOUS at 22:22

## 2019-08-24 RX ADMIN — ALBUTEROL SULFATE 2.5 MG: 2.5 SOLUTION RESPIRATORY (INHALATION) at 20:26

## 2019-08-24 RX ADMIN — PANTOPRAZOLE SODIUM 40 MG: 40 TABLET, DELAYED RELEASE ORAL at 08:55

## 2019-08-24 RX ADMIN — ACETAMINOPHEN 650 MG: 325 TABLET ORAL at 11:41

## 2019-08-24 RX ADMIN — ALBUTEROL SULFATE 2.5 MG: 2.5 SOLUTION RESPIRATORY (INHALATION) at 12:11

## 2019-08-24 RX ADMIN — GABAPENTIN 300 MG: 300 CAPSULE ORAL at 08:55

## 2019-08-24 ASSESSMENT — PAIN SCALES - GENERAL
PAINLEVEL_OUTOF10: 8
PAINLEVEL_OUTOF10: 7

## 2019-08-24 NOTE — PROGRESS NOTES
mg Q10 Min PRN   povidone-iodine  PRN   albuterol 2.5 mg Q4H PRN   acetaminophen 650 mg Q6H PRN   sodium chloride flush 10 mL PRN   magnesium hydroxide 30 mL Daily PRN   ondansetron 4 mg Q6H PRN   hydrALAZINE 10 mg Q6H PRN       Diagnostic Labs:  CBC: Recent Labs     08/22/19  0555 08/22/19  2042 08/23/19  0533 08/24/19  0556   WBC 10.2  --  11.5* 9.7   RBC 3.68*  --  3.66* 3.56*   HGB 7.7* 8.5* 8.4* 8.2*   HCT 30.3* 32.4* 30.5* 32.3*   MCV 82.3*  --  83.3 90.7   RDW 18.6*  --  19.7* 20.9*     --  248 182     BMP: Recent Labs     08/22/19  0555 08/23/19  0533 08/24/19  0556    135 136   K 4.6 4.8 5.0    96* 99   CO2 26 27 24   BUN 19 27* 35*   CREATININE 1.65* 2.07* 3.21*       ASSESSMENT & PLAN     ASSESSMENT:     Primary Problem  Nonhealing ulcer of multiple sites of right lower extremity ([facility]    Active Hospital Problems    Diagnosis Date Noted    Multiple open wounds of lower extremity, right, initial encounter [S81.801A]     Acute anemia [D64.9] 08/22/2019  8/22/19 Pelvic EUA, Pap smear, D&C, Cystoscopy, proctosigmoidoscopy, Cervical myomectomy, EMBx  [Z98.890] 08/22/2019    Pedunculated cervical fibroid [D25.9] 08/22/2019    Enlarged fibroid uterus [D25.9] 08/21/2019    Abnormal uterine bleeding [N93.9] 08/20/2019    Leg ulcer, unspecified laterality, limited to breakdown of skin ([facility] [L97.901] 08/20/2019    Infected wound [T14. 8XXA, L08.9] 08/19/2019    Nonhealing ulcer of multiple sites of right lower extremity ([facility] [address] [L97.919] 08/19/2019    HTN (hypertension) [I10] 08/19/2019    Lymphedema of both lower extremities [I89.0] 08/19/2019    CKD (chronic kidney disease) [N18.9] 08/19/2019    Morbid obesity ([facility] [address] [E66.01] 09/30/2018    Iron deficiency anemia [D50.9]     Blood loss anemia [D50.0] 09/23/2018    Bilateral leg pain [M79.604, M79.605]        PLAN:     1. Nonhealing ulcers on bilateral lower extremities: Skin biopsy performed by ID yesterday.  Follow-up on biopsy

## 2019-08-24 NOTE — PROGRESS NOTES
[x]  Less than 20 [x]  Less than 20 []  20-25 []  Greater than 25 []  Greater than 25   Peak flow % of Pred or PB [x]  NA   []  Greater than 90%  []  81-90% []  71-80% []  Less than or equal to 70%  or unable to perform []  Unable due to Respiratory Distress   Dyspnea re []  Patient Baseline []  No SOB []  No SOB [x]  SOB on exertion []  SOB min activity []  At rest/acute   e FEV% Predicted       [x]  NA []  Above 69%  []  Unable []  Above 60-69%  []  Unable []  Above 50-59%  []  Unable []  Above 35-49%  []  Unable []  Less than 35%  []  Unable                 []  Hyperinflation Assessment  Score 1 2 3   CXR and Breath Sounds   []  Clear []  No atelectasis  Basilar aeration []  Atelectasis or absent basilar breath sounds   Incentive Spirometry Volume  (Per IBW)   []  Greater than or equal to 15ml/Kg []  less than 15ml/Kg []  less than 15ml/Kg   Surgery within last 2 weeks []  None or general   []  Abdominal or thoracic surgery  []  Abdominal or thoracic   Chronic Pulmonary Historyre []  No []  Yes []  Yes     []  Secretion Management Assessment  Score 1 2 3   Bilateral Breath Sounds   []  Occasional Rhonchi []  Scattered Rhonchi []  Course Rhonchi and/or poor aeration   Sputum    []  Small amount of thin secretions []  Moderate amount of viscous secretions []  Copius, Viscious Yellow/ Secretions   CXR as reported by physician []  clear  []  Unavailable []  Infiltrates and/or consolidation  []  Unavailable []  Mucus Plugging and or lobar consolidation  []  Unavailable   Cough []  Strong, productive cough []  Weak productive cough []  No cough or weak non-productive cough   SELENA BACA  12:14 PM                            FEMALE                                  MALE                            FEV1 Predicted Normal Values                        FEV1 Predicted Normal Values          Age                                     Height in Feet and Inches       Age                                     Height in Feet and

## 2019-08-24 NOTE — PROGRESS NOTES
[facility] - Progress Note    Today's Date and Time: 8/24/2019, 12:22 PM    Impression :   · Nonhealing ulcers on both lower extremities. Hx of prior brown recluse spider bites  · Appearance of wounds raises the question of pyoderma gangrenosum. · SAM    Recommendations:   · Monitor off antibiotics  · Wound care  · Renal evaluation    Medical Decision Making/Summary/Discussion:8/24/2019     ·   Infection Control Recommendations   · [place] Precautions    Antimicrobial Stewardship Recommendations     · Simplification of therapy    Coordination of Outpatient Care:   · Estimated Length of IV antimicrobials:No  · Patient will need Midline Catheter Insertion: No  · Patient will need PICC line Insertion:No  · Patient will need: Home IV , [place],  SNF,  LTAC:No  · Patient will need outpatient wound care:Yes    Chief complaint/reason for consultation:   · Nonhealing chronic bilateral extremity secondary to spider bites      History of Present Illness:   Nellie Simmons is a 77y.o.-year-old  female who was initially admitted on 8/19/2019. Patient seen at the request of Dr. Ernst Lozoya:    Pt with a past medical history of hypertension, CKD stage III, CAD, morbid obesity, bilateral lower extremity lymphedema, history of spider  Bites who presented to the ED on 8//2019 because of chronic nonhealing bilateral lower extremity wounds with worsening pain and discharge on bilateral lower extremities, right more than left. She was admitted for the same wounds last year, at the onset of the bites. She had sepsis during the hospital course and received antibiotics along with excisional debridements of the wounds. Patient states that the antibiotics had significantly helped and her wounds had decreased in size. However, she was given 2 different treatment plans after discharge.   One of her physicians had told her to stop the antibiotics, which since Daily       Social History:     Social History     Socioeconomic History    Marital status: Single     Spouse name: Not on file    Number of children: Not on file    Years of education: Not on file    Highest education level: Not on file   Occupational History    Not on file   Social Needs    Financial resource strain: Not on file    Food insecurity:     Worry: Not on file     Inability: Not on file    Transportation needs:     Medical: Not on file     Non-medical: Not on file   Tobacco Use    Smoking status: Former Smoker    Smokeless tobacco: Former User   Substance and Sexual Activity    Alcohol use: No    Drug use: No    Sexual activity: Never   Lifestyle    Physical activity:     Days per week: Not on file     Minutes per session: Not on file    Stress: Not on file   Relationships    Social connections:     Talks on phone: Not on file     Gets together: Not on file     Attends [employer] service: Not on file     Active member of club or organization: Not on file     Attends meetings of clubs or organizations: Not on file     Relationship status: Not on file    Intimate partner violence:     Fear of current or ex partner: Not on file     Emotionally abused: Not on file     Physically abused: Not on file     Forced sexual activity: Not on file   Other Topics Concern    Not on file   Social History Narrative    Not on file       Family History:     Family History   Adopted: Yes   Problem Relation Age of Onset    Cancer Mother         Allergies:   Fruit & vegetable daily [nutritional supplements] and Aspirin     Review of Systems:   Constitutional: No fevers or chills. No systemic complaints  Head: No headaches  Eyes: No double vision or blurry vision. No conjunctival inflammation. ENT: No sore throat or runny nose. . No hearing loss, tinnitus or vertigo. Cardiovascular: No chest pain or palpitations. No shortness of breath. No GUTIÉRREZ  Lung: No shortness of breath or cough.  No sputum production  Abdomen: No nausea, vomiting, diarrhea, or abdominal pain. [place] No cramps. Genitourinary: Pt states she still gets heavy periods  Musculoskeletal: No muscle aches or pains. No joint effusions, swelling or deformities  Hematologic: No bleeding or bruising. Neurologic: No headache, weakness, numbness, or tingling. Integument: Ulcers on BLE due to spider bites  Psychiatric: No depression. Endocrine: No polyuria, no polydipsia, no polyphagia. Physical Examination :     Patient Vitals for the past 8 hrs:   BP Temp Temp src Pulse Resp SpO2   08/24/19 0816 (!) 127/40 98 °F (36.7 °C) Oral 61 18 100 %   08/24/19 0423 (!) 146/93 97.5 °F (36.4 °C) Oral 64 20 98 %     General Appearance: Awake, alert, and in no apparent distress  Head:  Normocephalic, no trauma  Eyes: Pupils equal, round, reactive to light and accommodation; extraocular movements intact; sclera anicteric; conjunctivae pink. No embolic phenomena. ENT: Oropharynx clear, without erythema, exudate, or thrush. No tenderness of sinuses. Mouth/throat: mucosa pink and moist. No lesions. Dentition in good repair. Neck:Supple, without lymphadenopathy. Thyroid normal, No bruits. Pulmonary/Chest: Clear to auscultation, without wheezes, rales, or rhonchi. No dullness to percussion. Cardiovascular: Regular rate and rhythm without murmurs, rubs, or gallops. Abdomen: Soft, non tender. Bowel sounds normal. No organomegaly  All four Extremities: No cyanosis, clubbing, edema, or effusions. Neurologic: No gross sensory or motor deficits.   Skin: BLE chronic wounds with serous drainage    Medical Decision Making -Laboratory:   I have independently reviewed/ordered the following labs:    CBC with Differential:   Recent Labs     08/23/19 0533 08/24/19  0556   WBC 11.5* 9.7   HGB 8.4* 8.2*   HCT 30.5* 32.3*    182   LYMPHOPCT 7* 8*   MONOPCT 6 9     BMP:   Recent Labs     08/23/19 0533 08/24/19  0556    136   K 4.8 5.0   CL 96* 99   CO2 27 24

## 2019-08-25 ENCOUNTER — APPOINTMENT (OUTPATIENT)
Dept: ULTRASOUND IMAGING | Age: 69
DRG: 744 | End: 2019-08-25
Payer: MEDICARE

## 2019-08-25 LAB
ABSOLUTE EOS #: 0.19 K/UL (ref 0–0.44)
ABSOLUTE IMMATURE GRANULOCYTE: 0.09 K/UL (ref 0–0.3)
ABSOLUTE LYMPH #: 0.74 K/UL (ref 1.1–3.7)
ABSOLUTE MONO #: 0.74 K/UL (ref 0.1–1.2)
ALBUMIN SERPL-MCNC: 3 G/DL (ref 3.5–5.2)
ALBUMIN/GLOBULIN RATIO: 0.7 (ref 1–2.5)
ALP BLD-CCNC: 91 U/L (ref 35–104)
ALT SERPL-CCNC: <5 U/L (ref 5–33)
ANION GAP SERPL CALCULATED.3IONS-SCNC: 11 MMOL/L (ref 9–17)
AST SERPL-CCNC: 9 U/L
BASOPHILS # BLD: 1 % (ref 0–2)
BASOPHILS ABSOLUTE: 0.09 K/UL (ref 0–0.2)
BILIRUB SERPL-MCNC: 0.35 MG/DL (ref 0.3–1.2)
BILIRUBIN DIRECT: 0.15 MG/DL
BILIRUBIN, INDIRECT: 0.2 MG/DL (ref 0–1)
BUN BLDV-MCNC: 43 MG/DL (ref 8–23)
BUN/CREAT BLD: ABNORMAL (ref 9–20)
CALCIUM SERPL-MCNC: 8.3 MG/DL (ref 8.6–10.4)
CHLORIDE BLD-SCNC: 98 MMOL/L (ref 98–107)
CO2: 24 MMOL/L (ref 20–31)
CREAT SERPL-MCNC: 2.81 MG/DL (ref 0.5–0.9)
DIFFERENTIAL TYPE: ABNORMAL
EOSINOPHIL,URINE: NORMAL
EOSINOPHILS RELATIVE PERCENT: 2 % (ref 1–4)
GFR AFRICAN AMERICAN: 20 ML/MIN
GFR NON-AFRICAN AMERICAN: 17 ML/MIN
GFR SERPL CREATININE-BSD FRML MDRD: ABNORMAL ML/MIN/{1.73_M2}
GFR SERPL CREATININE-BSD FRML MDRD: ABNORMAL ML/MIN/{1.73_M2}
GLOBULIN: ABNORMAL G/DL (ref 1.5–3.8)
GLUCOSE BLD-MCNC: 105 MG/DL (ref 65–105)
GLUCOSE BLD-MCNC: 89 MG/DL (ref 70–99)
HCT VFR BLD CALC: 30 % (ref 36.3–47.1)
HEMOGLOBIN: 8.1 G/DL (ref 11.9–15.1)
IMMATURE GRANULOCYTES: 1 %
LYMPHOCYTES # BLD: 8 % (ref 24–43)
MCH RBC QN AUTO: 22.7 PG (ref 25.2–33.5)
MCHC RBC AUTO-ENTMCNC: 27 G/DL (ref 28.4–34.8)
MCV RBC AUTO: 84 FL (ref 82.6–102.9)
MONOCYTES # BLD: 8 % (ref 3–12)
MORPHOLOGY: ABNORMAL
NRBC AUTOMATED: 0 PER 100 WBC
PDW BLD-RTO: 21.6 % (ref 11.8–14.4)
PLATELET # BLD: 232 K/UL (ref 138–453)
PLATELET ESTIMATE: ABNORMAL
PMV BLD AUTO: 10.7 FL (ref 8.1–13.5)
POTASSIUM SERPL-SCNC: 4.9 MMOL/L (ref 3.7–5.3)
RBC # BLD: 3.57 M/UL (ref 3.95–5.11)
RBC # BLD: ABNORMAL 10*6/UL
SEG NEUTROPHILS: 80 % (ref 36–65)
SEGMENTED NEUTROPHILS ABSOLUTE COUNT: 7.45 K/UL (ref 1.5–8.1)
SODIUM BLD-SCNC: 133 MMOL/L (ref 135–144)
TOTAL PROTEIN: 7.2 G/DL (ref 6.4–8.3)
WBC # BLD: 9.3 K/UL (ref 3.5–11.3)
WBC # BLD: ABNORMAL 10*3/UL

## 2019-08-25 PROCEDURE — 99232 SBSQ HOSP IP/OBS MODERATE 35: CPT | Performed by: INTERNAL MEDICINE

## 2019-08-25 PROCEDURE — 86334 IMMUNOFIX E-PHORESIS SERUM: CPT

## 2019-08-25 PROCEDURE — 80076 HEPATIC FUNCTION PANEL: CPT

## 2019-08-25 PROCEDURE — 2700000000 HC OXYGEN THERAPY PER DAY

## 2019-08-25 PROCEDURE — 6370000000 HC RX 637 (ALT 250 FOR IP): Performed by: STUDENT IN AN ORGANIZED HEALTH CARE EDUCATION/TRAINING PROGRAM

## 2019-08-25 PROCEDURE — 80048 BASIC METABOLIC PNL TOTAL CA: CPT

## 2019-08-25 PROCEDURE — 84156 ASSAY OF PROTEIN URINE: CPT

## 2019-08-25 PROCEDURE — 84166 PROTEIN E-PHORESIS/URINE/CSF: CPT

## 2019-08-25 PROCEDURE — 36415 COLL VENOUS BLD VENIPUNCTURE: CPT

## 2019-08-25 PROCEDURE — 6360000002 HC RX W HCPCS: Performed by: STUDENT IN AN ORGANIZED HEALTH CARE EDUCATION/TRAINING PROGRAM

## 2019-08-25 PROCEDURE — 87040 BLOOD CULTURE FOR BACTERIA: CPT

## 2019-08-25 PROCEDURE — 94640 AIRWAY INHALATION TREATMENT: CPT

## 2019-08-25 PROCEDURE — 6360000002 HC RX W HCPCS: Performed by: INTERNAL MEDICINE

## 2019-08-25 PROCEDURE — 85025 COMPLETE CBC W/AUTO DIFF WBC: CPT

## 2019-08-25 PROCEDURE — 87086 URINE CULTURE/COLONY COUNT: CPT

## 2019-08-25 PROCEDURE — 2580000003 HC RX 258: Performed by: STUDENT IN AN ORGANIZED HEALTH CARE EDUCATION/TRAINING PROGRAM

## 2019-08-25 PROCEDURE — 76705 ECHO EXAM OF ABDOMEN: CPT

## 2019-08-25 PROCEDURE — 1200000000 HC SEMI PRIVATE

## 2019-08-25 PROCEDURE — 82947 ASSAY GLUCOSE BLOOD QUANT: CPT

## 2019-08-25 PROCEDURE — 51798 US URINE CAPACITY MEASURE: CPT

## 2019-08-25 RX ORDER — CLONIDINE HYDROCHLORIDE 0.1 MG/1
0.1 TABLET ORAL 2 TIMES DAILY
Status: DISCONTINUED | OUTPATIENT
Start: 2019-08-26 | End: 2019-08-25

## 2019-08-25 RX ORDER — SODIUM CHLORIDE 9 MG/ML
INJECTION, SOLUTION INTRAVENOUS CONTINUOUS
Status: DISCONTINUED | OUTPATIENT
Start: 2019-08-25 | End: 2019-08-25

## 2019-08-25 RX ORDER — CLONIDINE HYDROCHLORIDE 0.2 MG/1
0.2 TABLET ORAL 2 TIMES DAILY
Status: DISCONTINUED | OUTPATIENT
Start: 2019-08-25 | End: 2019-08-25

## 2019-08-25 RX ORDER — FUROSEMIDE 10 MG/ML
40 INJECTION INTRAMUSCULAR; INTRAVENOUS ONCE
Status: COMPLETED | OUTPATIENT
Start: 2019-08-25 | End: 2019-08-25

## 2019-08-25 RX ORDER — CLONIDINE HYDROCHLORIDE 0.1 MG/1
0.1 TABLET ORAL 2 TIMES DAILY
Status: DISCONTINUED | OUTPATIENT
Start: 2019-08-25 | End: 2019-08-27 | Stop reason: HOSPADM

## 2019-08-25 RX ADMIN — FERROUS SULFATE TAB EC 325 MG (65 MG FE EQUIVALENT) 325 MG: 325 (65 FE) TABLET DELAYED RESPONSE at 18:29

## 2019-08-25 RX ADMIN — CLONIDINE HYDROCHLORIDE 0.2 MG: 0.2 TABLET ORAL at 09:50

## 2019-08-25 RX ADMIN — CEFTRIAXONE SODIUM 1 G: 1 INJECTION, POWDER, FOR SOLUTION INTRAMUSCULAR; INTRAVENOUS at 09:52

## 2019-08-25 RX ADMIN — GABAPENTIN 300 MG: 300 CAPSULE ORAL at 22:24

## 2019-08-25 RX ADMIN — HEPARIN SODIUM 5000 UNITS: 5000 INJECTION INTRAVENOUS; SUBCUTANEOUS at 06:37

## 2019-08-25 RX ADMIN — CLONIDINE HYDROCHLORIDE 0.1 MG: 0.1 TABLET ORAL at 23:00

## 2019-08-25 RX ADMIN — HEPARIN SODIUM 5000 UNITS: 5000 INJECTION INTRAVENOUS; SUBCUTANEOUS at 14:42

## 2019-08-25 RX ADMIN — Medication 10 ML: at 22:24

## 2019-08-25 RX ADMIN — PANTOPRAZOLE SODIUM 40 MG: 40 TABLET, DELAYED RELEASE ORAL at 09:50

## 2019-08-25 RX ADMIN — SODIUM CHLORIDE: 9 INJECTION, SOLUTION INTRAVENOUS at 09:52

## 2019-08-25 RX ADMIN — GABAPENTIN 300 MG: 300 CAPSULE ORAL at 09:50

## 2019-08-25 RX ADMIN — FUROSEMIDE 40 MG: 10 INJECTION, SOLUTION INTRAMUSCULAR; INTRAVENOUS at 18:29

## 2019-08-25 RX ADMIN — CARVEDILOL 25 MG: 12.5 TABLET, FILM COATED ORAL at 18:29

## 2019-08-25 RX ADMIN — ALLOPURINOL 50 MG: 100 TABLET ORAL at 09:50

## 2019-08-25 RX ADMIN — FERROUS SULFATE TAB EC 325 MG (65 MG FE EQUIVALENT) 325 MG: 325 (65 FE) TABLET DELAYED RESPONSE at 09:50

## 2019-08-25 RX ADMIN — ATORVASTATIN CALCIUM 10 MG: 10 TABLET, FILM COATED ORAL at 09:50

## 2019-08-25 RX ADMIN — HEPARIN SODIUM 5000 UNITS: 5000 INJECTION INTRAVENOUS; SUBCUTANEOUS at 22:24

## 2019-08-25 RX ADMIN — AMLODIPINE BESYLATE 10 MG: 10 TABLET ORAL at 09:50

## 2019-08-25 RX ADMIN — LEVOTHYROXINE SODIUM 75 MCG: 75 TABLET ORAL at 06:36

## 2019-08-25 RX ADMIN — ALBUTEROL SULFATE 2.5 MG: 2.5 SOLUTION RESPIRATORY (INHALATION) at 08:39

## 2019-08-25 RX ADMIN — ALBUTEROL SULFATE 2.5 MG: 2.5 SOLUTION RESPIRATORY (INHALATION) at 14:58

## 2019-08-25 RX ADMIN — ALBUTEROL SULFATE 2.5 MG: 2.5 SOLUTION RESPIRATORY (INHALATION) at 20:47

## 2019-08-25 RX ADMIN — CARVEDILOL 25 MG: 12.5 TABLET, FILM COATED ORAL at 09:50

## 2019-08-25 NOTE — PROGRESS NOTES
[facility] - Progress Note    Today's Date and Time: 8/25/2019, 8:06 AM    Impression :   · Nonhealing ulcers on both lower extremities. Hx of prior brown recluse spider bites  · Appearance of wounds raises the question of pyoderma gangrenosum. · SAM  · UTI    Recommendations:   · Continue Rocephin for UTI  · Awaiting further recommendations by Dr. Heike Hammer  · Wound care  · Renal evaluation    Medical Decision Making/Summary/Discussion:8/25/2019     ·   Infection Control Recommendations   · [place] Precautions    Antimicrobial Stewardship Recommendations     · Simplification of therapy    Coordination of Outpatient Care:   · Estimated Length of IV antimicrobials:No  · Patient will need Midline Catheter Insertion: No  · Patient will need PICC line Insertion:No  · Patient will need: Home IV , [place],  SNF,  LTAC:No  · Patient will need outpatient wound care:Yes    Chief complaint/reason for consultation:   · Nonhealing chronic bilateral extremity secondary to spider bites      History of Present Illness:   Jesenia Mg is a 77y.o.-year-old  female who was initially admitted on 8/19/2019. Patient seen at the request of Dr. Cheema Dys:    Pt with a past medical history of hypertension, CKD stage III, CAD, morbid obesity, bilateral lower extremity lymphedema, history of spider  Bites who presented to the ED on 8//2019 because of chronic nonhealing bilateral lower extremity wounds with worsening pain and discharge on bilateral lower extremities, right more than left. She was admitted for the same wounds last year, at the onset of the bites. She had sepsis during the hospital course and received antibiotics along with excisional debridements of the wounds. Patient states that the antibiotics had significantly helped and her wounds had decreased in size. However, she was given 2 different treatment plans after discharge.   One of her physicians had told her to stop the antibiotics, which since then she has noticed an increase in swelling and pain at wound sites. Patient had been brought to the ER due to her home care nurse noticing her blood pressure to be in the 200s. Of note, her blood pressure in the ER was in the 070 systolic and her hemoglobin was 6.6. She was transfused with 1 unit of PRBC. Patient complains of menorrhagia and states that she has never attained menopause. OB/GYN was consulted and they will follow with further testing. Upon examination, patient is very tender to touch. Two wounds noted on the lateral aspect of the right leg and one wound noted on the lateral aspect of the left leg. All wounds had a fibro-granular base with serous drainage noted. Surrounding skin was  edematous but not erythematous. X ray Right Tib/Fib: diffuse ST swelling without radiopaque foreign body or soft tissue air. CURRENT EVALUATION: 8/25/2019    Afebrile  VS stable    The patient seen and evaluated at bedside. Patient is currently confused, alert and oriented x2; person and place but not time. Patient continues to repeat name or single words repeatedly. Overnight the nurse informed to primary team that the patient had decreased urine output and tender abdomen. Nephrology is consulted. Patient is also hypertensive ranging from high 160s to 170s. Patient is currently on Rocephin due to positive UTI. Urinalysis showed moderate leukocyte esterase and 2+ protein. Medicine will closely follow  Nephrology: Continue fluid restriction    Patient is status post OB/GYN procedure 8-22-19. Punch biopsies obtained 8-23 after coordinating with Pathologist-results pending    Patient was given a unit of blood 8-22-19 due to a drop in hemoglobin. OB/GYN: On 8/22/2019 OR pelvic exam, dilation and curettage, cytoscopy, proctosigmoidoscopy, cervical myometomy endometrial biopsy. US renal 8/24/2019: Mild left renal atrophy. gross sensory or motor deficits. Skin: BLE chronic wounds with serous drainage    Medical Decision Making -Laboratory:   I have independently reviewed/ordered the following labs:    CBC with Differential:   Recent Labs     08/24/19  0556 08/25/19  0615   WBC 9.7 9.3   HGB 8.2* 8.1*   HCT 32.3* 30.0*    232   LYMPHOPCT 8* 8*   MONOPCT 9 8     BMP:   Recent Labs     08/24/19  0556 08/25/19  0615    133*   K 5.0 4.9   CL 99 98   CO2 24 24   BUN 35* 43*   CREATININE 3.21* 2.81*     Hepatic Function Panel: No results for input(s): PROT, LABALBU, BILIDIR, IBILI, BILITOT, ALKPHOS, ALT, AST in the last 72 hours. No results for input(s): RPR in the last 72 hours. No results for input(s): HIV in the last 72 hours. No results for input(s): BC in the last 72 hours. Lab Results   Component Value Date    MUCUS NOT REPORTED 08/24/2019    RBC 3.57 08/25/2019    TRICHOMONAS NOT REPORTED 08/24/2019    WBC 9.3 08/25/2019    YEAST NOT REPORTED 08/24/2019    TURBIDITY TURBID 08/24/2019     Lab Results   Component Value Date    CREATININE 2.81 08/25/2019    GLUCOSE 89 08/25/2019       Medical Decision Making-Imaging:     EXAMINATION:   2 XRAY VIEWS OF THE RIGHT TIBIA AND FIBULA       8/19/2019 12:53 pm       COMPARISON:   None.       HISTORY:   ORDERING SYSTEM PROVIDED HISTORY: Chronic wounds r/o sub cut gas   TECHNOLOGIST PROVIDED HISTORY:   Chronic wounds r/o sub cut gas   Reason for Exam: Oozing wounds anterior/lateral leg from 10 mos. ago   Acuity: Unknown   Type of Exam: Unknown       FINDINGS:   Generalized soft tissue swelling.  No radiopaque foreign body.  No foci of   soft tissue air.  No aggressive periosteal reaction, bone lysis, or fracture.           Impression   Diffuse soft tissue swelling without radiopaque foreign body or soft tissue   air.      EXAMINATION:   PELVIC ULTRASOUND       8/20/2019       TECHNIQUE:   Trans abdominal and transvaginal ultrasound was performed       COMPARISON:   None

## 2019-08-25 NOTE — PROGRESS NOTES
[facility]LENA BACA[facility] Infected wound [T14. 8XXA, L08.9]  Leg ulcer, unspecified laterality, limited to breakdown of skin ([address] [L97.901]  Acute anemia [D64.9] . Vitals:    08/25/19 0645   BP: (!) 168/53   Pulse: 64   Resp: 18   Temp: 98 °F (36.7 °C)   SpO2: 98%   . Patients home meds are   Prior to Admission medications    Medication Sig Start Date End Date Taking?  Authorizing Provider   levothyroxine (SYNTHROID) 75 MCG tablet Take 75 mcg by mouth Daily   Yes Historical Provider, MD   cloNIDine (CATAPRES) 0.1 MG tablet Take 0.1 mg by mouth 2 times daily   Yes Historical Provider, MD   amLODIPine (NORVASC) 10 MG tablet Take 10 mg by mouth daily   Yes Historical Provider, MD   atorvastatin (LIPITOR) 10 MG tablet Take 10 mg by mouth daily   Yes Historical Provider, MD   meloxicam (MOBIC) 7.5 MG tablet Take 7.5 mg by mouth daily   Yes Historical Provider, MD   allopurinol (ZYLOPRIM) 100 MG tablet Take 100 mg by mouth daily   Yes Historical Provider, MD   Cholecalciferol (VITAMIN D3) 2000 units CAPS Take by mouth   Yes Historical Provider, MD   carvedilol (COREG) 25 MG tablet Take 1 tablet by mouth 2 times daily (with meals) 10/5/18  Yes Yvrose Luevano MD   pantoprazole (PROTONIX) 40 MG tablet Take 1 tablet by mouth daily 10/5/18  Yes Yvrose Luevano MD   furosemide (LASIX) 40 MG tablet Take 1 tablet by mouth daily 10/1/18  Yes Temple Righter, DO       RR 18  Breath Sounds: clear/diminished      · Bronchodilator assessment at level  2  · Hyperinflation assessment at level   · Secretion Management assessment at level    ·   · []    Bronchodilator Assessment  BRONCHODILATOR ASSESSMENT SCORE  Score 0 1 2 3 4 5   Breath Sounds   []  Patient Baseline []  No Wheeze good aeration [x]  Faint, scattered wheezing, good aeration []  Expiratory Wheezing and or moderately diminished []  Insp/Exp wheeze and/or very diminished []  Insp/Exp and/ or marked distress   Respiratory Rate   []  Patient Baseline

## 2019-08-25 NOTE — CONSULTS
08/25/2019    PATH ELECTRONICALLY SIGNED. Jeb Sharp M.D. 09/23/2018     UPEP:   Lab Results   Component Value Date    TPU 19 09/23/2018        C3:   Lab Results   Component Value Date    C3 126 08/24/2019     C4:   Lab Results   Component Value Date    C4 19 08/24/2019     MPO ANCA:    Lab Results   Component Value Date    MPO 7 09/23/2018    . PR3 ANCA:    Lab Results   Component Value Date    PR3 8 09/23/2018     Urine Sodium:    Lab Results   Component Value Date    GAYE <20 08/24/2019      Urine Creatinine:  Lab Results   Component Value Date    LABCREA 213.9 09/23/2018     Urine Eosinophils:   Lab Results   Component Value Date    UREO NONE SEEN 09/23/2018     Urine Protein:    Lab Results   Component Value Date    TPU 19 09/23/2018     Urinalysis:  U/A: Lab Results   Component Value Date    NITRU NEGATIVE 08/24/2019    COLORU ORANGE 08/24/2019    PHUR 5.0 08/24/2019    WBCUA TOO NUMEROUS TO COUNT 08/24/2019    RBCUA TOO NUMEROUS TO COUNT 08/24/2019    MUCUS NOT REPORTED 08/24/2019    TRICHOMONAS NOT REPORTED 08/24/2019    YEAST NOT REPORTED 08/24/2019    BACTERIA NOT REPORTED 08/24/2019    SPECGRAV 1.019 08/24/2019    LEUKOCYTESUR MODERATE 08/24/2019    UROBILINOGEN Normal 08/24/2019    BILIRUBINUR NEGATIVE  Verified by ictotest. 08/24/2019    GLUCOSEU NEGATIVE 08/24/2019    KETUA NEGATIVE 08/24/2019    AMORPHOUS NOT REPORTED 08/24/2019         Radiology:  EXAMINATION:   RETROPERITONEAL ULTRASOUND OF THE KIDNEYS AND URINARY BLADDER       8/24/2019       COMPARISON:   September 23, 2018 renal ultrasound       HISTORY:   ORDERING SYSTEM PROVIDED HISTORY: SAM on CKD, rule out obstruction       FINDINGS:       Kidneys:       The right kidney measures 13.3 cm in length and the left kidney measures 8.5   cm in length.  There is a large simple right renal cortical cyst measuring 7   x 5.1 x 7.3 cm.       Kidneys demonstrate normal cortical echogenicity.  No evidence of   hydronephrosis or intrarenal stones. Resident/CNP. I agree with the assessment, plan and orders as documented by the Resident/CNP. Thanh Moratayaelor, MD   Nephrology [address] [place] [address] Drive    This note is created with the assistance of a speech-recognition program. While intending to generate a document that actually reflects the content of the visit, no guarantees can be provided that every mistake has been identified and corrected by editing.

## 2019-08-25 NOTE — PROGRESS NOTES
2 times per day    carvedilol  25 mg Oral BID WC    pantoprazole  40 mg Oral Daily     Continuous Infusions:    sodium chloride 75 mL/hr at 08/25/19 0952     PRN Medications  albuterol 2.5 mg Q6H PRN   labetalol 5 mg Q10 Min PRN   povidone-iodine  PRN   acetaminophen 650 mg Q6H PRN   sodium chloride flush 10 mL PRN   magnesium hydroxide 30 mL Daily PRN   ondansetron 4 mg Q6H PRN   hydrALAZINE 10 mg Q6H PRN       Diagnostic Labs:  CBC: Recent Labs     08/23/19  0533 08/24/19  0556 08/25/19  0615   WBC 11.5* 9.7 9.3   RBC 3.66* 3.56* 3.57*   HGB 8.4* 8.2* 8.1*   HCT 30.5* 32.3* 30.0*   MCV 83.3 90.7 84.0   RDW 19.7* 20.9* 21.6*    182 232     BMP: Recent Labs     08/23/19  0533 08/24/19  0556 08/25/19  0615    136 133*   K 4.8 5.0 4.9   CL 96* 99 98   CO2 27 24 24   BUN 27* 35* 43*   CREATININE 2.07* 3.21* 2.81*       ASSESSMENT & PLAN     ASSESSMENT:     Primary Problem  Nonhealing ulcer of multiple sites of right lower extremity ([address]    Active Hospital Problems    Diagnosis Date Noted    Multiple open wounds of lower extremity, right, initial encounter [S81.801A]     Acute anemia [D64.9] 08/22/2019  8/22/19 Pelvic EUA, Pap smear, D&C, Cystoscopy, proctosigmoidoscopy, Cervical myomectomy, EMBx  [Z98.890] 08/22/2019    Pedunculated cervical fibroid [D25.9] 08/22/2019    Enlarged fibroid uterus [D25.9] 08/21/2019    Abnormal uterine bleeding [N93.9] 08/20/2019    Leg ulcer, unspecified laterality, limited to breakdown of skin ([facility] [L97.901] 08/20/2019    Infected wound [T14. 8XXA, L08.9] 08/19/2019    Nonhealing ulcer of multiple sites of right lower extremity ([facility] [L97.919] 08/19/2019    HTN (hypertension) [I10] 08/19/2019    Lymphedema of both lower extremities [I89.0] 08/19/2019    CKD (chronic kidney disease) [N18.9] 08/19/2019    Morbid obesity ([address] [E66.01] 09/30/2018    Iron deficiency anemia [D50.9]     Blood loss anemia [D50.0] 09/23/2018    Bilateral leg pain [M79.604,

## 2019-08-26 LAB
ABSOLUTE EOS #: 0.17 K/UL (ref 0–0.44)
ABSOLUTE IMMATURE GRANULOCYTE: 0 K/UL (ref 0–0.3)
ABSOLUTE LYMPH #: 0.85 K/UL (ref 1.1–3.7)
ABSOLUTE MONO #: 0.43 K/UL (ref 0.1–1.2)
ANA REFERENCE RANGE:: ABNORMAL
ANION GAP SERPL CALCULATED.3IONS-SCNC: 11 MMOL/L (ref 9–17)
ANTI DNA DOUBLE STRANDED: 25 IU/ML
ANTI JO-1 IGG: 11 U/ML
ANTI RNP AB: 48 U/ML
ANTI SSA: 132 U/ML
ANTI SSB: 20 U/ML
ANTI-CENTROMERE: 23 U/ML
ANTI-NUCLEAR ANTIBODY (ANA): POSITIVE
ANTI-SCLERODERMA: 77 U/ML
ANTI-SMITH: 20 U/ML
BASOPHILS # BLD: 0 % (ref 0–2)
BASOPHILS ABSOLUTE: 0 K/UL (ref 0–0.2)
BUN BLDV-MCNC: 43 MG/DL (ref 8–23)
BUN/CREAT BLD: ABNORMAL (ref 9–20)
CALCIUM SERPL-MCNC: 8.5 MG/DL (ref 8.6–10.4)
CHLORIDE BLD-SCNC: 102 MMOL/L (ref 98–107)
CO2: 27 MMOL/L (ref 20–31)
CREAT SERPL-MCNC: 1.99 MG/DL (ref 0.5–0.9)
CREATININE URINE: 257.4 MG/DL (ref 28–217)
CREATININE URINE: 258.4 MG/DL (ref 28–217)
CULTURE: NO GROWTH
DERMATOLOGY PATHOLOGY REPORT: NORMAL
DIFFERENTIAL TYPE: ABNORMAL
EOSINOPHILS RELATIVE PERCENT: 2 % (ref 1–4)
GFR AFRICAN AMERICAN: 30 ML/MIN
GFR NON-AFRICAN AMERICAN: 25 ML/MIN
GFR SERPL CREATININE-BSD FRML MDRD: ABNORMAL ML/MIN/{1.73_M2}
GFR SERPL CREATININE-BSD FRML MDRD: ABNORMAL ML/MIN/{1.73_M2}
GLUCOSE BLD-MCNC: 106 MG/DL (ref 70–99)
HCT VFR BLD CALC: 28.8 % (ref 36.3–47.1)
HCT VFR BLD CALC: 32 % (ref 36.3–47.1)
HEMOGLOBIN: 7.9 G/DL (ref 11.9–15.1)
HEMOGLOBIN: 8.5 G/DL (ref 11.9–15.1)
HISTONE ANTIBODY: 51 U/ML
IMMATURE GRANULOCYTES: 0 %
LYMPHOCYTES # BLD: 10 % (ref 24–43)
Lab: NORMAL
MCH RBC QN AUTO: 22.2 PG (ref 25.2–33.5)
MCHC RBC AUTO-ENTMCNC: 26.6 G/DL (ref 28.4–34.8)
MCV RBC AUTO: 83.6 FL (ref 82.6–102.9)
MONOCYTES # BLD: 5 % (ref 3–12)
MORPHOLOGY: ABNORMAL
NRBC AUTOMATED: 0 PER 100 WBC
P E INTERPRETATION, U: NORMAL
PATHOLOGIST: NORMAL
PATHOLOGIST: NORMAL
PDW BLD-RTO: 22.3 % (ref 11.8–14.4)
PLATELET # BLD: 178 K/UL (ref 138–453)
PLATELET ESTIMATE: ABNORMAL
PMV BLD AUTO: 10.6 FL (ref 8.1–13.5)
POTASSIUM SERPL-SCNC: 4.7 MMOL/L (ref 3.7–5.3)
RBC # BLD: 3.83 M/UL (ref 3.95–5.11)
RBC # BLD: ABNORMAL 10*6/UL
SEG NEUTROPHILS: 83 % (ref 36–65)
SEGMENTED NEUTROPHILS ABSOLUTE COUNT: 7.05 K/UL (ref 1.5–8.1)
SERUM IFX INTERP: NORMAL
SODIUM BLD-SCNC: 140 MMOL/L (ref 135–144)
SODIUM,UR: 21 MMOL/L
SPECIMEN DESCRIPTION: NORMAL
SPECIMEN TYPE: NORMAL
SURGICAL PATHOLOGY REPORT: NORMAL
TOTAL PROTEIN, URINE: 158 MG/DL
URINE TOTAL PROTEIN CREATININE RATIO: 0.61 (ref 0–0.2)
URINE TOTAL PROTEIN: 32 MG/DL
WBC # BLD: 8.5 K/UL (ref 3.5–11.3)
WBC # BLD: ABNORMAL 10*3/UL

## 2019-08-26 PROCEDURE — 85014 HEMATOCRIT: CPT

## 2019-08-26 PROCEDURE — 97530 THERAPEUTIC ACTIVITIES: CPT

## 2019-08-26 PROCEDURE — 97535 SELF CARE MNGMENT TRAINING: CPT

## 2019-08-26 PROCEDURE — 2580000003 HC RX 258: Performed by: STUDENT IN AN ORGANIZED HEALTH CARE EDUCATION/TRAINING PROGRAM

## 2019-08-26 PROCEDURE — 99223 1ST HOSP IP/OBS HIGH 75: CPT | Performed by: INTERNAL MEDICINE

## 2019-08-26 PROCEDURE — 99232 SBSQ HOSP IP/OBS MODERATE 35: CPT | Performed by: INTERNAL MEDICINE

## 2019-08-26 PROCEDURE — 1200000000 HC SEMI PRIVATE

## 2019-08-26 PROCEDURE — 6370000000 HC RX 637 (ALT 250 FOR IP): Performed by: STUDENT IN AN ORGANIZED HEALTH CARE EDUCATION/TRAINING PROGRAM

## 2019-08-26 PROCEDURE — 80048 BASIC METABOLIC PNL TOTAL CA: CPT

## 2019-08-26 PROCEDURE — 94760 N-INVAS EAR/PLS OXIMETRY 1: CPT

## 2019-08-26 PROCEDURE — 76937 US GUIDE VASCULAR ACCESS: CPT

## 2019-08-26 PROCEDURE — 85025 COMPLETE CBC W/AUTO DIFF WBC: CPT

## 2019-08-26 PROCEDURE — 2700000000 HC OXYGEN THERAPY PER DAY

## 2019-08-26 PROCEDURE — 87086 URINE CULTURE/COLONY COUNT: CPT

## 2019-08-26 PROCEDURE — 6360000002 HC RX W HCPCS: Performed by: STUDENT IN AN ORGANIZED HEALTH CARE EDUCATION/TRAINING PROGRAM

## 2019-08-26 PROCEDURE — 36415 COLL VENOUS BLD VENIPUNCTURE: CPT

## 2019-08-26 PROCEDURE — 85018 HEMOGLOBIN: CPT

## 2019-08-26 PROCEDURE — 99213 OFFICE O/P EST LOW 20 MIN: CPT

## 2019-08-26 PROCEDURE — 94761 N-INVAS EAR/PLS OXIMETRY MLT: CPT

## 2019-08-26 RX ADMIN — PANTOPRAZOLE SODIUM 40 MG: 40 TABLET, DELAYED RELEASE ORAL at 08:59

## 2019-08-26 RX ADMIN — CLONIDINE HYDROCHLORIDE 0.1 MG: 0.1 TABLET ORAL at 21:18

## 2019-08-26 RX ADMIN — CLONIDINE HYDROCHLORIDE 0.1 MG: 0.1 TABLET ORAL at 08:59

## 2019-08-26 RX ADMIN — FERROUS SULFATE TAB EC 325 MG (65 MG FE EQUIVALENT) 325 MG: 325 (65 FE) TABLET DELAYED RESPONSE at 08:59

## 2019-08-26 RX ADMIN — CARVEDILOL 25 MG: 12.5 TABLET, FILM COATED ORAL at 17:07

## 2019-08-26 RX ADMIN — Medication 10 ML: at 09:00

## 2019-08-26 RX ADMIN — CEFTRIAXONE SODIUM 1 G: 1 INJECTION, POWDER, FOR SOLUTION INTRAMUSCULAR; INTRAVENOUS at 09:49

## 2019-08-26 RX ADMIN — GABAPENTIN 300 MG: 300 CAPSULE ORAL at 08:59

## 2019-08-26 RX ADMIN — GABAPENTIN 300 MG: 300 CAPSULE ORAL at 21:18

## 2019-08-26 RX ADMIN — HEPARIN SODIUM 5000 UNITS: 5000 INJECTION INTRAVENOUS; SUBCUTANEOUS at 21:18

## 2019-08-26 RX ADMIN — HEPARIN SODIUM 5000 UNITS: 5000 INJECTION INTRAVENOUS; SUBCUTANEOUS at 06:01

## 2019-08-26 RX ADMIN — HEPARIN SODIUM 5000 UNITS: 5000 INJECTION INTRAVENOUS; SUBCUTANEOUS at 17:07

## 2019-08-26 RX ADMIN — ATORVASTATIN CALCIUM 10 MG: 10 TABLET, FILM COATED ORAL at 08:59

## 2019-08-26 RX ADMIN — Medication 10 ML: at 21:22

## 2019-08-26 RX ADMIN — FERROUS SULFATE TAB EC 325 MG (65 MG FE EQUIVALENT) 325 MG: 325 (65 FE) TABLET DELAYED RESPONSE at 17:07

## 2019-08-26 RX ADMIN — LEVOTHYROXINE SODIUM 75 MCG: 75 TABLET ORAL at 06:01

## 2019-08-26 RX ADMIN — CARVEDILOL 25 MG: 12.5 TABLET, FILM COATED ORAL at 08:59

## 2019-08-26 RX ADMIN — AMLODIPINE BESYLATE 10 MG: 10 TABLET ORAL at 08:59

## 2019-08-26 RX ADMIN — ALLOPURINOL 50 MG: 100 TABLET ORAL at 08:59

## 2019-08-26 ASSESSMENT — PAIN DESCRIPTION - PAIN TYPE: TYPE: CHRONIC PAIN

## 2019-08-26 ASSESSMENT — PAIN DESCRIPTION - DESCRIPTORS: DESCRIPTORS: BURNING;CONSTANT

## 2019-08-26 ASSESSMENT — PAIN DESCRIPTION - ORIENTATION: ORIENTATION: RIGHT;LEFT

## 2019-08-26 ASSESSMENT — PAIN SCALES - GENERAL: PAINLEVEL_OUTOF10: 3

## 2019-08-26 ASSESSMENT — PAIN DESCRIPTION - LOCATION: LOCATION: LEG

## 2019-08-26 NOTE — PROGRESS NOTES
Training  Safety Devices  Type of devices: Nurse notified, Call light within reach, Patient at risk for falls, Gait belt, Left in bed  Restraints  Initially in place: No     Therapy Time   Individual Concurrent Group Co-treatment   Time In 1315         Time Out 1355         Minutes 40         Timed Code Treatment Minutes: 20 Minutes(Co-treat w/ OT billable time adjusted.)       Azra Guadalupe, PTA

## 2019-08-26 NOTE — PROGRESS NOTES
allopurinol  50 mg Oral Daily    albuterol  2.5 mg Nebulization TID    heparin (porcine)  5,000 Units Subcutaneous 3 times per day    gabapentin  300 mg Oral BID    atorvastatin  10 mg Oral Daily    levothyroxine  75 mcg Oral Daily    ferrous sulfate  325 mg Oral BID WC    amLODIPine  10 mg Oral Daily    sodium chloride flush  10 mL Intravenous 2 times per day    carvedilol  25 mg Oral BID WC    pantoprazole  40 mg Oral Daily     Continuous Infusions:   PRN Medications  albuterol 2.5 mg Q6H PRN   labetalol 5 mg Q10 Min PRN   povidone-iodine  PRN   acetaminophen 650 mg Q6H PRN   sodium chloride flush 10 mL PRN   magnesium hydroxide 30 mL Daily PRN   ondansetron 4 mg Q6H PRN   hydrALAZINE 10 mg Q6H PRN       Diagnostic Labs:  CBC: Recent Labs     08/24/19  0556 08/25/19  0615 08/26/19  0548   WBC 9.7 9.3 8.5   RBC 3.56* 3.57* 3.83*   HGB 8.2* 8.1* 8.5*   HCT 32.3* 30.0* 32.0*   MCV 90.7 84.0 83.6   RDW 20.9* 21.6* 22.3*    232 178     BMP: Recent Labs     08/24/19  0556 08/25/19  0615 08/26/19  0548    133* 140   K 5.0 4.9 4.7   CL 99 98 102   CO2 24 24 27   BUN 35* 43* 43*   CREATININE 3.21* 2.81* 1.99*       LIVER PROFILE: Recent Labs     08/25/19  0615   AST 9   ALT <5*   BILIDIR 0.15   BILITOT 0.35   ALKPHOS 91      MICROBIOLOGY:   Lab Results   Component Value Date/Time    CULTURE NO GROWTH 08/25/2019 01:41 PM       Imaging:    Xr Tibia Fibula Right (2 Views)    Result Date: 8/19/2019  Diffuse soft tissue swelling without radiopaque foreign body or soft tissue air. Us Renal Complete    Result Date: 8/24/2019  Mild left renal atrophy. Possible acute cholecystitis. Recommend dedicated gallbladder ultrasound. Us Non Ob Transvaginal    Result Date: 8/20/2019  Multiple fibroids are noted in the uterus. Ovaries are not seen Small amount of free fluid is noted     Us Pelvis Complete    Result Date: 8/20/2019  Multiple fibroids are noted in the uterus.  Ovaries are not seen Small amount of Encephalopathy. Resolved   6. UTI - continue rocephin 1 g daily, blood cultures negative so far, urine culture negative, ID following  7. Acute cholecystitis -RUQ tenderness+, consult general surgery  8. Hypothyroidism -continue home dose of Synthroid 75 mcg daily  9. Essential hypertension -controlled, continue Norvasc 10 mg, clonidine 0.1 mg twice daily, Coreg 25 mg twice daily    DVT ppx : Heparin 5000 units 3 times daily  GI ppx: None    PT/OT: Working  Discharge Planning / Emmanuelle Fernando: home vs SNF,  working     Virgie Anand MD  Internal Medicine Resident, PGY-3  [address] [place], [place]  8/26/2019, 8:14 AM      Attending Physician Statement  I have discussed the case, including pertinent history and exam findings with the resident and the team.  I have seen and examined the patient and the key elements of the encounter have been performed by me. I agree with the assessment, plan and orders as documented by the resident.         Noé Lr MD, MALATHI, [address]  Attending Physician, Internal Medicine Service    Internal Medicine Residency Program  8/26/2019, 6:36 PM

## 2019-08-26 NOTE — CONSULTS
_                         Today's Date: 8/26/2019  Patient Name: Jasmin Koch  Date of admission: 8/19/2019 12:11 PM  Patient's age: 77 y.o., 1950  Admission Dx: Infected wound [T14. 8XXA, L08.9]  Leg ulcer, unspecified laterality, limited to breakdown of skin ([address] [L97.901]  Acute anemia [D64.9]      Requesting Physician: Noé Jolly MD    CHIEF COMPLAINT: Nonhealing lower extremities ulcers. Consult for endometrial cancer. History Obtained From:  patient, electronic medical record    HISTORY OF PRESENT ILLNESS:      The patient is a 77 y.o.  female who is admitted to the hospital for further management of bilateral lower extremities lymphedema with nonhealing ulcers. Management is done by primary as well as infectious disease and surgery. Patient also had recent right upper quadrant abdominal pain. Possibly cholecystitis. Surgery on call. The patient has history of vaginal bleeding for several months. She was evaluated by GYN oncology. Patient had D&C August 22, 2019. 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 Otherwise patient is clinically stable. No chest pain or shortness of breath. No headaches or dizziness. No fever. No weight loss or decreased appetite. No other complaints. Past Medical History:   has a past medical history of SAM (acute kidney injury) ([address] Heart attack ([address] HTN (hypertension), Morbid obesity ([address] and Skin lesions. Past Surgical History:   has a past surgical history that includes Tubal ligation (1974); pr office/outpt visit,procedure only (Bilateral, 9/24/2018); Dilation and curettage of uterus (08/22/2019); myomectomy (08/22/2019); sigmoidoscopy (08/22/2019); Endometrial biopsy (08/22/2019); Dilation and curettage of uterus (N/A, 8/22/2019); and Cervix biopsy (N/A, 8/22/2019). Family History: family history includes Cancer in her mother. She was adopted. Social History:   reports that she has quit smoking. She has quit using smokeless tobacco. She reports that she does not drink alcohol or use drugs. Medications:    Prior to Admission medications    Medication Sig Start Date End Date Taking?  Authorizing Provider   levothyroxine (SYNTHROID) 75 MCG tablet Take 75 mcg by mouth Daily   Yes Historical Provider, MD   cloNIDine (CATAPRES) 0.1 MG tablet Take 0.1 mg by mouth 2 times daily   Yes Historical Provider, MD   amLODIPine (NORVASC) 10 MG tablet Take 10 mg by mouth daily   Yes Historical Provider, MD   atorvastatin (LIPITOR) 10 MG tablet Take 10 mg by mouth daily   Yes Historical Provider, MD   meloxicam (MOBIC) 7.5 MG tablet Take 7.5 mg by mouth daily   Yes Historical Provider, MD   allopurinol (ZYLOPRIM) 100 MG tablet Take 100 mg by mouth daily   Yes Historical Provider, MD   Cholecalciferol (VITAMIN D3) 2000 units CAPS Take by mouth   Yes Historical Provider, MD   carvedilol (COREG) 25 MG tablet Take 1 tablet by mouth 2 times daily (with meals) 10/5/18  Yes Sonja Clifford MD   pantoprazole (PROTONIX) 40 MG tablet Take 1 tablet by mouth daily 10/5/18  Yes Sonja Clifford MD   furosemide (LASIX) 40 MG tablet Take 1 tablet by mouth daily 10/1/18  Yes Tommy Peguero,      Current Facility-Administered Medications   Medication Dose Route Frequency Provider Last Rate Last Dose    cefTRIAXone (ROCEPHIN) 1 g IVPB in 50 mL D5W minibag  1 g Intravenous Q24H Mimi Coats MD   Stopped at 08/26/19 1019    cloNIDine (CATAPRES) tablet 0.1 mg  0.1 mg Oral BID Mami Maradiaga MD   0.1 mg at 08/26/19 0859    allopurinol (ZYLOPRIM) tablet 50 mg  50 mg Oral Daily Mimi Coats MD   50 mg at 08/26/19 0859    albuterol (PROVENTIL) nebulizer solution 2.5 mg  2.5 mg Nebulization Q6H PRN Yanely Piña MD        heparin (porcine) injection 5,000 Units  5,000 Units Subcutaneous 3 times per day Mimi Coats MD   5,000

## 2019-08-26 NOTE — PROGRESS NOTES
Refused teaching                               [] N/A         Electronically signed by Salty Carrillo RN, CWON on 8/26/2019 at 2:36 PM

## 2019-08-26 NOTE — PROGRESS NOTES
[facility] - Progress Note    Today's Date and Time: 8/26/2019, 7:44 AM    Impression :   · Nonhealing ulcers on both lower extremities. Hx of prior brown recluse spider bites  · Appearance of wounds raises the question of pyoderma gangrenosum. 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 Stop date 8-30-19  · Wound care  · Renal evaluation    Medical Decision Making/Summary/Discussion:8/26/2019     ·   Infection Control Recommendations   · [place] Precautions    Antimicrobial Stewardship Recommendations     · Simplification of therapy    Coordination of Outpatient Care:   · Estimated Length of IV antimicrobials: 8-30-19  · Patient will need Midline Catheter Insertion: No  · Patient will need PICC line Insertion:No  · Patient will need: Home IV , [place],  SNF,  LTAC:No  · Patient will need outpatient wound care:Yes    Chief complaint/reason for consultation:   · Nonhealing chronic bilateral extremity secondary to spider bites      History of Present Illness:   Jose Verduzco is a 77y.o.-year-old  female who was initially admitted on 8/19/2019. Patient seen at the request of Dr. Leslie Santos:    Pt with a past medical history of hypertension, CKD stage III, CAD, morbid obesity, bilateral lower extremity lymphedema, history of spider  Bites who presented to the ED on 8//2019 because of chronic nonhealing bilateral lower extremity wounds with worsening pain and discharge on bilateral lower extremities, right more than left. She was admitted for the same wounds last year, at the onset of the bites. She had sepsis during the hospital course and received antibiotics along with excisional debridements of the wounds. Patient states that the antibiotics had significantly helped and her wounds had decreased in size.   However, she was given 2 different treatment plans after discharge. One of her physicians had told her to stop the antibiotics, which since then she has noticed an increase in swelling and pain at wound sites. Patient had been brought to the ER due to her home care nurse noticing her blood pressure to be in the 200s. Of note, her blood pressure in the ER was in the 665 systolic and her hemoglobin was 6.6. She was transfused with 1 unit of PRBC. Patient complains of menorrhagia and states that she has never attained menopause. OB/GYN was consulted and they will follow with further testing. Upon examination, patient is very tender to touch. Two wounds noted on the lateral aspect of the right leg and one wound noted on the lateral aspect of the left leg. All wounds had a fibro-granular base with serous drainage noted. Surrounding skin was  edematous but not erythematous. X ray Right Tib/Fib: diffuse ST swelling without radiopaque foreign body or soft tissue air. CURRENT EVALUATION: 8/26/2019    The patient seen and evaluated at bedside. Patient is a lot more awake and oriented today. Patient is able to recollect that she was confused yesterday. Patient and granddaughter noticed increased swelling in both her hands along with pain in her right arm. Most likely due to IV. Right leg wounds have fibrotic slough noted superficially. Denies any nausea, vomiting, fever, chills, chest pain, and shortness of breath. Patient is currently on Rocephin due to positive UTI. Urinalysis showed moderate leukocyte esterase and 2+ protein. Nephrology suggests discontinuing IV fluids, continuing with IV Lasix. Patient is status post OB/GYN procedure 8-22-19. Endometrium biopsy with high grade serous carcinoma. Punch biopsies obtained 8-23 after coordinating with Pathologist-results pending. Patient was given a unit of blood 8-22-19 due to a drop in hemoglobin.     OB/GYN: On 8/22/2019 OR pelvic exam, dilation and curettage, file    Intimate partner violence:     Fear of current or ex partner: Not on file     Emotionally abused: Not on file     Physically abused: Not on file     Forced sexual activity: Not on file   Other Topics Concern    Not on file   Social History Narrative    Not on file       Family History:     Family History   Adopted: Yes   Problem Relation Age of Onset    Cancer Mother         Allergies:   Fruit & vegetable daily [nutritional supplements] and Aspirin     Review of Systems:   Constitutional: No fevers or chills. No systemic complaints  Head: No headaches  Eyes: No double vision or blurry vision. No conjunctival inflammation. ENT: No sore throat or runny nose. . No hearing loss, tinnitus or vertigo. Cardiovascular: No chest pain or palpitations. No shortness of breath. No GUTIÉRREZ  Lung: No shortness of breath or cough. No sputum production  Abdomen: No nausea, vomiting, diarrhea, or abdominal pain. Heddie [place] No cramps. Genitourinary: Pt states she still gets heavy periods  Musculoskeletal: No muscle aches or pains. No joint effusions, swelling or deformities  Hematologic: No bleeding or bruising. Neurologic: No headache, weakness, numbness, or tingling. Integument: Ulcers on BLE due to spider bites  Psychiatric: No depression. Endocrine: No polyuria, no polydipsia, no polyphagia. Physical Examination :     Patient Vitals for the past 8 hrs:   BP Temp Temp src Pulse Resp SpO2 Weight   08/26/19 0545 -- -- -- -- -- -- (!) 328 lb (148.8 kg)   08/26/19 0305 (!) 156/57 98.2 °F (36.8 °C) Oral 60 17 98 % --     General Appearance: Awake, alert, and in no apparent distress  Head:  Normocephalic, no trauma  Eyes: Pupils equal, round, reactive to light and accommodation; extraocular movements intact; sclera anicteric; conjunctivae pink. No embolic phenomena. ENT: Oropharynx clear, without erythema, exudate, or thrush. No tenderness of sinuses. Mouth/throat: mucosa pink and moist. No lesions.  Dentition in good

## 2019-08-26 NOTE — PLAN OF CARE
BRONCHOSPASM/BRONCHOCONSTRICTION     ? IMPROVE AERATION/BREATH SOUNDS  ? ADMINISTER BRONCHODILATOR THERAPY AS APPROPRIATE  ? ASSESS BREATH SOUNDS  ? IMPLEMENT AEROSOL/MDI PROTOCOL  ?    PATIENT EDUCATION AS NEEDED
No fall up to date, bed in lowest position and call light within reach
Problem: Falls - Risk of  Fall screening completed. Patient alert and oriented, uses call light appropriately. Needs assessed with hourly rounding. Environment scanned for safety issues. Goal: Will remain free from falls  Description  Will remain free from falls  Outcome: Ongoing  Goal: Absence of physical injury  Description  Absence of physical injury  Outcome: Ongoing     Problem: Risk for Impaired Skin Integrity  Aurelio score assessed each shift. Writer ensured pt repositioned if not done by self. Mattress overlay applied as needed per Aurelio score. Skin visually assessed with head to toe. Preventative measures used as appropriate    Goal: Tissue integrity - skin and mucous membranes  Description  Structural intactness and normal physiological function of skin and  mucous membranes. Outcome: Ongoing     Problem: Pain:  Pain assessed with hourly rounding and PRN. Pain meds adminstered as needed and reassessed. Pt offered diversional activities, repositioning, and comfort items. Goal: Pain level will decrease  Description  Pain level will decrease  Outcome: Ongoing  Goal: Control of acute pain  Description  Control of acute pain  Outcome: Ongoing  Goal: Control of chronic pain  Description  Control of chronic pain  Outcome: Ongoing     Problem: Musculor/Skeletal Functional Status  Goal: Highest potential functional level  Outcome: Ongoing  Goal: Absence of falls  Outcome: Ongoing     Problem: Nutrition  Goal: Optimal nutrition therapy  8/20/2019 1744 by Brooklyn Bhatia RN  Outcome: Ongoing  8/20/2019 1519 by Angelica Bui RD, LD  Outcome: Ongoing  Note:   Nutrition Problem: Increased nutrient needs  Intervention: Food and/or Nutrient Delivery: Continue current diet(Provide ONS with meals as/if needed.)  Nutritional Goals: Oral intakes to meet at least 75% of estimated nutrition needs.
Problem: Falls - Risk of:  Goal: Will remain free from falls  Description  Will remain free from falls  Outcome: Ongoing  Goal: Absence of physical injury  Description  Absence of physical injury  Outcome: Ongoing     Problem: Risk for Impaired Skin Integrity  Goal: Tissue integrity - skin and mucous membranes  Description  Structural intactness and normal physiological function of skin and  mucous membranes.   Outcome: Ongoing     Problem: Pain:  Goal: Pain level will decrease  Description  Pain level will decrease  Outcome: Ongoing  Goal: Control of acute pain  Description  Control of acute pain  Outcome: Ongoing  Goal: Control of chronic pain  Description  Control of chronic pain  Outcome: Ongoing     Problem: Musculor/Skeletal Functional Status  Goal: Highest potential functional level  Outcome: Ongoing  Goal: Absence of falls  Outcome: Ongoing     Problem: Nutrition  Goal: Optimal nutrition therapy  Outcome: Ongoing
Repositioned every two hours for skin integrity.
[phone]   65 277 292 306 321 336 351 366 381 65 363 392 421 449 478 [phone]   70 269 284 299 314 329 344 359 374 70 353 382 410 439 468 [phone]   75 261 274 289 305 319 334 348 364 75 344 372 400 429 458 [phone]   80 253 266 282 296 312 327 342 356 80 335 362 390 419 448 [phone]                 Problem: Gas Exchange - Impaired:  Intervention: Provide oxygen therapy  Note:   PROVIDE ADEQUATE OXYGENATION WITH ACCEPTABLE SP02/ABG'S    xx  IDENTIFY APPROPRIATE OXYGEN THERAPY  xx   MONITOR SP02/ABG'S AS NEEDED   xx   PATIENT EDUCATION AS NEEDED

## 2019-08-26 NOTE — PROGRESS NOTES
LEUKOCYTESUR MODERATE 08/24/2019    UROBILINOGEN Normal 08/24/2019    BILIRUBINUR NEGATIVE  Verified by ictotest. 08/24/2019    GLUCOSEU NEGATIVE 08/24/2019    KETUA NEGATIVE 08/24/2019    AMORPHOUS NOT REPORTED 08/24/2019     Urine Sodium:     Lab Results   Component Value Date    GAYE <20 08/24/2019     Urine Osmolarity:   Lab Results   Component Value Date    OSMOU 485 09/23/2018       Urine Creatinine:     Lab Results   Component Value Date    LABCREA 257.4 08/24/2019    LABCREA 258.4 08/24/2019         Radiology:     Renal US (8/19): Mild left renal atrophy. Possible acute cholecystitis.  Recommend dedicated gallbladder ultrasound. Gallbladder US (8/25): Gallbladder wall thickening, cholelithiasis, gallbladder sludge, pericholecystic fluid.  Concerning for acute cholecystitis although negative  sonographic Engel's sign reported.  This could be due to pain medication  administration.  Clinical correlation advised. Assessment:     1. SAM suspect ATN from hypoperfusion,  creatinine improving today,  Cr improving at 1.99 today, baseline = 1.5.  2. B/L LE wounds with necrotic changes s/p debreadment  3. Blood loss anemia - source leg wounds and possible GI blood loss. Requiring blood transfusion. 4. Elevated trop - likely demand ischemia and SAM  5. Moderate- severe LVH  5. Skin biopsy performed by ID to rule out pyoderma gangrenosum.  Pending results. 6.   Cytology from cervix concerning for adenocarcinoma. Surgical pathology pending. 7.   Possible acute cholecystitis, correlate clinically     Plan:   Plan:  1. Encourage oral intake and fluids     2. Continue Lasix 20mg, PO    3. Monitor strict I's and O's and daily BMP for renal function. Insert grant for accurate I/O  4. Will follow. Nutrition   Please ensure that patient is on a renal diet/TF. Avoid nephrotoxic drugs/contrast exposure. We will continue to follow along with you.      Candi Arellano [address] IV  Nephrology Medical Student

## 2019-08-26 NOTE — PROGRESS NOTES
Occupational Therapy  Facility/Department: [facility] ONC/MED SURG  Daily Treatment Note  NAME: Shaheed De Santiago  : 1950  MRN: [id number]    Date of Service: 2019    Discharge Recommendations: Further therapy recommended at discharge. Ed on OT services, transfer safety/bed mob, walker safety/technique, deep breathing technique, ADLs, orientation review- good return     Assessment   Performance deficits / Impairments: Decreased functional mobility ; Decreased ADL status; Decreased endurance  Prognosis: Fair  REQUIRES OT FOLLOW UP: Yes  Activity Tolerance  Activity Tolerance: Patient Tolerated treatment well;Patient limited by fatigue  Activity Tolerance: pt very drowsy during the session but motivated to participate in therapy noted. Pt had received medicine prior to writer's arrival.  Safety Devices  Safety Devices in place: Yes  Type of devices: All fall risk precautions in place; Bed alarm in place;Call light within reach; Left in bed;Nurse notified(wound mgt DOMINGO Zamora in room)         Patient Diagnosis(es): The encounter diagnosis was Multiple open wounds of lower extremity, right, initial encounter. has a past medical history of SAM (acute kidney injury) ([address] Heart attack ([address] HTN (hypertension), Morbid obesity ([address] and Skin lesions. has a past surgical history that includes Tubal ligation (); pr office/outpt visit,procedure only (Bilateral, 2018); Dilation and curettage of uterus (2019); myomectomy (2019); sigmoidoscopy (2019); Endometrial biopsy (2019); Dilation and curettage of uterus (N/A, 2019); and Cervix biopsy (N/A, 2019).     Restrictions  Restrictions/Precautions  Restrictions/Precautions: General Precautions, Fall Risk  Required Braces or Orthoses?: No  Subjective   General  Chart Reviewed: Progress Notes, Imaging, Labs, History and Physical, Orders  Patient assessed for rehabilitation services?: Yes  Family / Caregiver Present: Yes  Pain

## 2019-08-27 ENCOUNTER — APPOINTMENT (OUTPATIENT)
Dept: NUCLEAR MEDICINE | Age: 69
DRG: 744 | End: 2019-08-27
Payer: MEDICARE

## 2019-08-27 ENCOUNTER — APPOINTMENT (OUTPATIENT)
Dept: CT IMAGING | Age: 69
DRG: 744 | End: 2019-08-27
Payer: MEDICARE

## 2019-08-27 ENCOUNTER — TELEPHONE (OUTPATIENT)
Dept: ONCOLOGY | Age: 69
End: 2019-08-27

## 2019-08-27 VITALS
WEIGHT: 293 LBS | OXYGEN SATURATION: 100 % | SYSTOLIC BLOOD PRESSURE: 161 MMHG | DIASTOLIC BLOOD PRESSURE: 55 MMHG | HEART RATE: 64 BPM | BODY MASS INDEX: 51.91 KG/M2 | HEIGHT: 63 IN | TEMPERATURE: 98.8 F | RESPIRATION RATE: 18 BRPM

## 2019-08-27 PROBLEM — C54.1 ENDOMETRIAL CANCER (HCC): Status: ACTIVE | Noted: 2019-08-27

## 2019-08-27 LAB
ABSOLUTE EOS #: 0.16 K/UL (ref 0–0.44)
ABSOLUTE IMMATURE GRANULOCYTE: 0.08 K/UL (ref 0–0.3)
ABSOLUTE LYMPH #: 0.88 K/UL (ref 1.1–3.7)
ABSOLUTE MONO #: 0.64 K/UL (ref 0.1–1.2)
ALBUMIN SERPL-MCNC: 3.1 G/DL (ref 3.5–5.2)
ALBUMIN/GLOBULIN RATIO: 0.8 (ref 1–2.5)
ALP BLD-CCNC: 83 U/L (ref 35–104)
ALT SERPL-CCNC: <5 U/L (ref 5–33)
ANCA MYELOPEROXIDASE: 13 AU/ML
ANCA PROTEINASE 3: 22 AU/ML
ANION GAP SERPL CALCULATED.3IONS-SCNC: 9 MMOL/L (ref 9–17)
AST SERPL-CCNC: 7 U/L
BASOPHILS # BLD: 1 % (ref 0–2)
BASOPHILS ABSOLUTE: 0.08 K/UL (ref 0–0.2)
BILIRUB SERPL-MCNC: 0.25 MG/DL (ref 0.3–1.2)
BILIRUBIN DIRECT: 0.12 MG/DL
BILIRUBIN, INDIRECT: 0.13 MG/DL (ref 0–1)
BUN BLDV-MCNC: 41 MG/DL (ref 8–23)
BUN/CREAT BLD: ABNORMAL (ref 9–20)
CALCIUM SERPL-MCNC: 8.7 MG/DL (ref 8.6–10.4)
CHLORIDE BLD-SCNC: 103 MMOL/L (ref 98–107)
CO2: 26 MMOL/L (ref 20–31)
CREAT SERPL-MCNC: 1.47 MG/DL (ref 0.5–0.9)
CULTURE: NO GROWTH
DIFFERENTIAL TYPE: ABNORMAL
EOSINOPHILS RELATIVE PERCENT: 2 % (ref 1–4)
GFR AFRICAN AMERICAN: 43 ML/MIN
GFR NON-AFRICAN AMERICAN: 35 ML/MIN
GFR SERPL CREATININE-BSD FRML MDRD: ABNORMAL ML/MIN/{1.73_M2}
GFR SERPL CREATININE-BSD FRML MDRD: ABNORMAL ML/MIN/{1.73_M2}
GLOBULIN: ABNORMAL G/DL (ref 1.5–3.8)
GLUCOSE BLD-MCNC: 95 MG/DL (ref 70–99)
HCT VFR BLD CALC: 29.2 % (ref 36.3–47.1)
HEMOGLOBIN: 7.9 G/DL (ref 11.9–15.1)
IMMATURE GRANULOCYTES: 1 %
LYMPHOCYTES # BLD: 11 % (ref 24–43)
Lab: NORMAL
MCH RBC QN AUTO: 23 PG (ref 25.2–33.5)
MCHC RBC AUTO-ENTMCNC: 27.1 G/DL (ref 28.4–34.8)
MCV RBC AUTO: 85.1 FL (ref 82.6–102.9)
MONOCYTES # BLD: 8 % (ref 3–12)
MORPHOLOGY: ABNORMAL
NRBC AUTOMATED: 0 PER 100 WBC
PDW BLD-RTO: 22.4 % (ref 11.8–14.4)
PLATELET # BLD: 171 K/UL (ref 138–453)
PLATELET ESTIMATE: ABNORMAL
PMV BLD AUTO: 10.8 FL (ref 8.1–13.5)
POTASSIUM SERPL-SCNC: 4.7 MMOL/L (ref 3.7–5.3)
RBC # BLD: 3.43 M/UL (ref 3.95–5.11)
RBC # BLD: ABNORMAL 10*6/UL
SEG NEUTROPHILS: 77 % (ref 36–65)
SEGMENTED NEUTROPHILS ABSOLUTE COUNT: 6.16 K/UL (ref 1.5–8.1)
SODIUM BLD-SCNC: 138 MMOL/L (ref 135–144)
SPECIMEN DESCRIPTION: NORMAL
TOTAL PROTEIN: 7.1 G/DL (ref 6.4–8.3)
WBC # BLD: 8 K/UL (ref 3.5–11.3)
WBC # BLD: ABNORMAL 10*3/UL

## 2019-08-27 PROCEDURE — 36415 COLL VENOUS BLD VENIPUNCTURE: CPT

## 2019-08-27 PROCEDURE — A9537 TC99M MEBROFENIN: HCPCS | Performed by: STUDENT IN AN ORGANIZED HEALTH CARE EDUCATION/TRAINING PROGRAM

## 2019-08-27 PROCEDURE — 97110 THERAPEUTIC EXERCISES: CPT

## 2019-08-27 PROCEDURE — 99232 SBSQ HOSP IP/OBS MODERATE 35: CPT | Performed by: INTERNAL MEDICINE

## 2019-08-27 PROCEDURE — 80076 HEPATIC FUNCTION PANEL: CPT

## 2019-08-27 PROCEDURE — 94761 N-INVAS EAR/PLS OXIMETRY MLT: CPT

## 2019-08-27 PROCEDURE — 76937 US GUIDE VASCULAR ACCESS: CPT

## 2019-08-27 PROCEDURE — 6370000000 HC RX 637 (ALT 250 FOR IP): Performed by: STUDENT IN AN ORGANIZED HEALTH CARE EDUCATION/TRAINING PROGRAM

## 2019-08-27 PROCEDURE — 78227 HEPATOBIL SYST IMAGE W/DRUG: CPT

## 2019-08-27 PROCEDURE — 94760 N-INVAS EAR/PLS OXIMETRY 1: CPT

## 2019-08-27 PROCEDURE — 80048 BASIC METABOLIC PNL TOTAL CA: CPT

## 2019-08-27 PROCEDURE — 3430000000 HC RX DIAGNOSTIC RADIOPHARMACEUTICAL: Performed by: STUDENT IN AN ORGANIZED HEALTH CARE EDUCATION/TRAINING PROGRAM

## 2019-08-27 PROCEDURE — 99239 HOSP IP/OBS DSCHRG MGMT >30: CPT | Performed by: INTERNAL MEDICINE

## 2019-08-27 PROCEDURE — 6360000002 HC RX W HCPCS: Performed by: STUDENT IN AN ORGANIZED HEALTH CARE EDUCATION/TRAINING PROGRAM

## 2019-08-27 PROCEDURE — 2580000003 HC RX 258: Performed by: STUDENT IN AN ORGANIZED HEALTH CARE EDUCATION/TRAINING PROGRAM

## 2019-08-27 PROCEDURE — 85025 COMPLETE CBC W/AUTO DIFF WBC: CPT

## 2019-08-27 PROCEDURE — 6360000004 HC RX CONTRAST MEDICATION: Performed by: STUDENT IN AN ORGANIZED HEALTH CARE EDUCATION/TRAINING PROGRAM

## 2019-08-27 PROCEDURE — 71250 CT THORAX DX C-: CPT

## 2019-08-27 RX ORDER — LANOLIN ALCOHOL/MO/W.PET/CERES
325 CREAM (GRAM) TOPICAL 2 TIMES DAILY WITH MEALS
Qty: 60 TABLET | Refills: 3 | Status: ON HOLD | OUTPATIENT
Start: 2019-08-27 | End: 2020-02-28

## 2019-08-27 RX ORDER — FUROSEMIDE 40 MG/1
20 TABLET ORAL DAILY
Qty: 60 TABLET | Refills: 3 | Status: CANCELLED | OUTPATIENT
Start: 2019-08-27

## 2019-08-27 RX ORDER — ASCORBIC ACID 500 MG
500 TABLET ORAL DAILY
Qty: 30 TABLET | Refills: 3 | Status: SHIPPED | OUTPATIENT
Start: 2019-08-27

## 2019-08-27 RX ORDER — ZINC SULFATE 50(220)MG
220 CAPSULE ORAL DAILY
Qty: 30 CAPSULE | Refills: 3 | COMMUNITY
Start: 2019-08-27

## 2019-08-27 RX ORDER — GABAPENTIN 300 MG/1
300 CAPSULE ORAL 3 TIMES DAILY
Qty: 90 CAPSULE | Refills: 0 | Status: SHIPPED | OUTPATIENT
Start: 2019-08-27 | End: 2019-09-26

## 2019-08-27 RX ORDER — FUROSEMIDE 20 MG/1
20 TABLET ORAL DAILY
Qty: 30 TABLET | Refills: 3 | Status: ON HOLD | OUTPATIENT
Start: 2019-08-27 | End: 2020-02-28

## 2019-08-27 RX ADMIN — CEFTRIAXONE SODIUM 1 G: 1 INJECTION, POWDER, FOR SOLUTION INTRAMUSCULAR; INTRAVENOUS at 13:10

## 2019-08-27 RX ADMIN — CARVEDILOL 25 MG: 12.5 TABLET, FILM COATED ORAL at 17:38

## 2019-08-27 RX ADMIN — SINCALIDE 2.98 MCG: 5 INJECTION, POWDER, LYOPHILIZED, FOR SOLUTION INTRAVENOUS at 10:30

## 2019-08-27 RX ADMIN — Medication 3 MILLICURIE: at 09:30

## 2019-08-27 RX ADMIN — HEPARIN SODIUM 5000 UNITS: 5000 INJECTION INTRAVENOUS; SUBCUTANEOUS at 13:58

## 2019-08-27 RX ADMIN — FERROUS SULFATE TAB EC 325 MG (65 MG FE EQUIVALENT) 325 MG: 325 (65 FE) TABLET DELAYED RESPONSE at 17:38

## 2019-08-27 ASSESSMENT — PAIN DESCRIPTION - PROGRESSION: CLINICAL_PROGRESSION: NOT CHANGED

## 2019-08-27 ASSESSMENT — PAIN DESCRIPTION - LOCATION: LOCATION: LEG

## 2019-08-27 ASSESSMENT — PAIN SCALES - GENERAL: PAINLEVEL_OUTOF10: 2

## 2019-08-27 ASSESSMENT — PAIN DESCRIPTION - ONSET: ONSET: ON-GOING

## 2019-08-27 ASSESSMENT — PAIN DESCRIPTION - PAIN TYPE: TYPE: CHRONIC PAIN

## 2019-08-27 ASSESSMENT — PAIN DESCRIPTION - DESCRIPTORS: DESCRIPTORS: DISCOMFORT

## 2019-08-27 ASSESSMENT — PAIN DESCRIPTION - FREQUENCY: FREQUENCY: CONTINUOUS

## 2019-08-27 ASSESSMENT — PAIN DESCRIPTION - ORIENTATION: ORIENTATION: RIGHT;LEFT

## 2019-08-27 NOTE — PROGRESS NOTES
08/26/2019    BILITOT 0.35 08/25/2019    ALKPHOS 91 08/25/2019    AST 9 08/25/2019    ALT <5 08/25/2019       ASSESSMENT:  Active Hospital Problems    Diagnosis Date Noted    Endometrial carcinoma ([facility] [C54.1]     Multiple open wounds of lower extremity, right, initial encounter [S81.801A]     Acute anemia [D64.9] 08/22/2019  8/22/19 Pelvic EUA, Pap smear, D&C, Cystoscopy, proctosigmoidoscopy, Cervical myomectomy, EMBx  [Z98.890] 08/22/2019    Pedunculated cervical fibroid [D25.9] 08/22/2019    Enlarged fibroid uterus [D25.9] 08/21/2019    Abnormal uterine bleeding [N93.9] 08/20/2019    Leg ulcer, unspecified laterality, limited to breakdown of skin ([facility] [L97.901] 08/20/2019    Infected wound [T14. 8XXA, L08.9] 08/19/2019    Nonhealing ulcer of multiple sites of right lower extremity ([facility] [L97.919] 08/19/2019    HTN (hypertension) [I10] 08/19/2019    Lymphedema of both lower extremities [I89.0] 08/19/2019    CKD (chronic kidney disease) [N18.9] 08/19/2019    Morbid obesity ([facility] [E66.01] 09/30/2018    Iron deficiency anemia [D50.9]     Blood loss anemia [D50.0] 09/23/2018    Bilateral leg pain [M79.604, M79.605]        77 y.o. female with RUQ abdominal pain    Plan:  1. Continue medical mgmt and supportive care per primary  2. Reviewed RUQ US and noted cholelithiasis, gall bladder sludge and wall thickening. 3. F/u HIDA scan to assess possible cholecystitis   4. Patient is a poor surgical candidate and would benefit from perc drain > surgery if evidence of cholecystitis is found   5.  F/u hepatic function panel     Electronically signed by Elias Prakash DO  on 8/27/2019 at 6:22 AM

## 2019-08-27 NOTE — DISCHARGE SUMMARY
was never followed up. OB/GYN consulted for menorrhagia, pelvic ultrasound on 8/20 showed multiple fibroids. EUA, cervical myomectomy and endometrial biopsy performed on 8/22/2019. Cytology showed adenocarcinoma. Surgical pathology showed high-grade serous carcinoma. Oncology recommends no hysterectomy as the patient is not a surgical candidate. Need follow-up with oncology on outpatient for radiotherapy/chemotherapy. ID was consulted for the leg ulcers, patient was started on doxycycline initially which was discontinued after 3 days. Skin biopsy was positive for pyoderma gangrenosum. Patient needs prednisone or cyclosporine which is going to be a problem with her endometrioid carcinoma. Considering doxycycline or minocycline but patient needs follow-up with ID as outpatient. Procedures/ Significant Interventions:    Examination under anesthesia, cervical myomectomy and endometrial biopsy on 8/22/2019. Skin biopsy on 8/23/2019. Consults:     Consults:     Final Specialist Recommendations/Findings:   IP CONSULT TO INTERNAL MEDICINE  IP CONSULT TO INFECTIOUS DISEASES  IP CONSULT TO CASE MANAGEMENT  IP CONSULT TO OB GYN  IP CONSULT TO GYNECOLOGIC ONCOLOGY  IP CONSULT TO CARDIOLOGY  IP CONSULT TO IV TEAM  IP CONSULT TO CASE MANAGEMENT  IP CONSULT TO NEPHROLOGY  IP CONSULT TO GENERAL SURGERY  IP CONSULT TO IV TEAM  IP CONSULT TO RADIATION ONCOLOGY  IP CONSULT TO HEM/ONC  IP CONSULT TO IV TEAM  IP CONSULT TO HOME CARE NEEDS      Any Hospital Acquired Infections: none    Discharge Functional Status:  stable    DISCHARGE PLAN     Disposition: home    Patient Instructions:   Discharge Medication List as of 8/27/2019  6:08 PM      CONTINUE these medications which have CHANGED    Details   gabapentin (NEURONTIN) 300 MG capsule Take 1 capsule by mouth 3 times daily for 30 days.  Take 300mg po nightly for 3 days, then 300mg po bid for 3 days, then 300mg po tid., Disp-90 capsule, R-0Normal      ferrous sulfate

## 2019-08-27 NOTE — PROGRESS NOTES
Caregiver Present: No  Subjective  Subjective: Pt and RN agreeable to PT. Pt reports fatigue \"I've been having tests done all day\". Agreeable to bed exs only. General Comment  Comments: Pt left in bed with call light within reach  Pain Screening  Patient Currently in Pain: Yes  Pain Assessment  Pain Assessment: 0-10  Pain Level: 2  Pain Type: Chronic pain  Pain Location: Leg  Pain Orientation: Right;Left(R>L)  Pain Descriptors: Discomfort  Pain Frequency: Continuous  Pain Onset: On-going  Clinical Progression: Not changed  Non-Pharmaceutical Pain Intervention(s): Ambulation/Increased Activity; Therapeutic presence  Vital Signs  Patient Currently in Pain: Yes       Orientation  Orientation  Overall Orientation Status: Within Functional Limits  Cognition      Objective   Bed mobility  Comment: pt deferred bed mob at this time stating \"I'm not going anywhere until after I eat\"  Transfers  Comment: pt declined mobility this date, agreeable to bed exs only  Ambulation  Ambulation?: No        Exercises  Supine Exercises: Ankle Pumps, Gluteal sets, Hamstring Sets, Heel Slides, Hip ABD/ADD,  Quad Sets, SAQ, SLR (AAROM). Reps: 10x   Upper extremity exercises: Bicep curl, shoulder flexion/extension, punches, tricep curl, shoulder abduction/adduction. Reps: 10x   Comments:    Goals  Short term goals  Time Frame for Short term goals: 14 visits   Short term goal 1: Pt will be able to completed bed mobility SBA. Short term goal 2: Pt will completed all transfers Min Ax1. Short term goal 3: Pt will ambulate 15 ft within the room using RW Min Ax1. Short term goal 4: Pt will be able to tolerate 45 minutes of activity in order to improve strength and endurance. Short term goal 5: Pt will improve static standing balance to Good-. Patient Goals   Patient goals : To return home;  To walk    Plan    Plan  Times per week: 5-6x/wk  Times per day: Daily  Current Treatment Recommendations: Strengthening, Gait Training, Balance

## 2019-08-27 NOTE — TELEPHONE ENCOUNTER
LATE ENTRY: 8/26/19 Dr. Farshad Moreno alerted writer to pt's case. Dr. Farshad Moreno requested oncology navigation. Dr. Paula Jesus stated pt currently admitted @ [address] & will consult MO/RO.      8/27/19 Upon review of Epic noted pt continues @ [address], Dr. Ronak Moser [place] 8/26/19 documentation noted. CT c/a/p wo contrast completed today. Will continue to follow.

## 2019-08-27 NOTE — PROGRESS NOTES
CKD (chronic kidney disease) [N18.9] 08/19/2019    Morbid obesity ([facility] [E66.01] 09/30/2018    Iron deficiency anemia [D50.9]     Blood loss anemia [D50.0] 09/23/2018    Bilateral leg pain [M79.604, M79.605]        PLAN:     1. Leg nonhealing ulcers due to pyoderma gangrenosum  Awaiting recommendations per ID. Patient may need systemic corticosteroids. 2. Acute blood loss anemia secondary to abnormal uterine bleeding: Hb this a.m. is 7.9. Continues to have vaginal bleeding. 3. Endometrial adenocarcinoma: No plan for surgery as patient is not a surgical candidate. Heme-onc and radiation oncology on board. CT chest, abdomen and pelvis for staging. 4. SAM on CKD stage III: Resolved. Creatinine this a.m. is 1.47, back to baseline. 5. Metabolic Encephalopathy: Resolved. 6. UTI: Continue rocephin 1 g daily through 8/30/2019. Blood cultures negative so far, urine culture negative, ID following. 7. Acute cholecystitis: HIDA scan today. General surgery on board. 8. Hypothyroidism: Continue home dose of Synthroid 75 mcg daily  9. Essential hypertension: Controlled, continue Norvasc 10 mg, clonidine 0.1 mg twice daily, Coreg 25 mg twice daily    DVT ppx : Heparin 5000 units 3 times daily. GI ppx: None    PT/OT: PT and OT working with the patient. Discharge Planning / SW: Patient was recommended to SNF. Patient and granddaughter wants to go home. Okay to discharge. Geremias Reyes MD  Internal Medicine Resident, PGY-1  [address], [place]  8/27/2019, 12:12 PM      Attending Physician Statement  I have discussed the case, including pertinent history and exam findings with the resident and the team.  I have seen and examined the patient and the key elements of the encounter have been performed by me. I agree with the assessment, plan and orders as documented by the resident. Patient completed HIDA scan which did not show any acute abnormality.   CT of the chest, abdomen and pelvis was completed for staging endometrial cancer. Noted some pelvic lymphadenopathy which could be potential for metastatic cancer. Again patient is not a candidate for surgery. She will follow-up with heme-onc and radiation oncology. I discussed with granddaughter who is the primary caretaker who would like to take her grandmother home. We will obtain home health service as well as social service and home health aide to assist with patient at home. Patient can be discharged home today.     Noé Davila MD, MALATHI, [address]  Attending Physician, Internal Medicine Service    Internal Medicine Residency Program  8/27/2019, 2:43 PM

## 2019-08-27 NOTE — FLOWSHEET NOTE
Assessment: Pt sitting up in bed in well-lit room. Her granddaughter, Brittney Fuentes, was sitting in a chair next to the bed. Rusty had asked the  to come pray with her grandmother Lu Mcguire (pt) if possible. She had stated she would be leaving around 10:00. She also stated that she would be coming back at 5:00 a.m. Intervention:  talked with Lu Mcguire a bit to see how she was doing. Then  acknowledged the close relationship that Bekah and Brittney Fuentes had, and what a blessing that is.  gave words of hope and encouragement. Prayer focused on healing for both of them and strength. Outcomes: They were both thankful for prayer and visit. Plan: Chaplains will remain available to offer spiritual and emotional support as needed. 08/26/19 5543   Encounter Summary   Services provided to: Patient and family together   Referral/Consult From: Family   Support System Family members   Place of Voodoo none, Raymond Ramírez   Continue Visiting   (8/26/19)   Complexity of Encounter Moderate   Length of Encounter 15 minutes   Spiritual Assessment Completed Yes   Crisis   Type Emotional distress   Assessment Approachable; Anxious; Hopeful   Intervention Active listening;Explored feelings, thoughts, concerns;Explored coping resources;Nurtured hope;Prayer;Sustaining presence/ Ministry of presence; Discussed belief system/Church practices/mikayla   Outcome Expressed gratitude;Engaged in conversation; Less anxious, less agitated;Receptive

## 2019-08-27 NOTE — PROGRESS NOTES
Gynecology Oncology Progress Note      Gunnar Carbone is a 77 y.o. 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 She is sleeping comfortably. Pain is controlled. Patient is tolerating oral intake. Keyes catheter was placed last night with good urine output with no hematuria. She reports continued vaginal bleeding. She is not ambulating. She is passing flatus. She denies Fever/Chills, Chest Pain, SOB, N/V, Calf Pain. Vitals:  Vitals:    08/26/19 0919 08/26/19 1133 08/26/19 1517 08/26/19 2009   BP:  (!) 154/59 (!) 140/49 (!) 137/48   Pulse:  59 60 58   Resp: 14 16 16 16   Temp:  98 °F (36.7 °C) 97.8 °F (36.6 °C) 98 °F (36.7 °C)   TempSrc:  Oral Oral Oral   SpO2: 99% 100% 100% 90%   Weight:       Height:             Intake/Output:  Last Shift: I/O last 3 completed shifts:  In: -   Out: 1075 [Urine:1075]  Current Shift: No intake/output data recorded.     1075 mL in the last 12 hrs of UOP overnight ~89.6 mL/hr    Physical Exam:  General: Obese, Alert and oriented, no acute distress  HEENT: normocephalic, atraumatic, supple, symmetrical, trachea midline, Pupils equal and reactive to light, Extraocular muscles intact, sclera non icteric  Respiratory: Good air movement bilaterally, unlabored respirations, no wheezes or rhonchi, expiratory wheezes  Cardiovascular: Regular rate and rhythm, no murmurs rubs or gallops  Abdomen:  soft, non tender, non distended, no rebound, guarding, or rigidity, bowel sounds present  Incisions: none  Extremities: positive bilateral LE edema, mild bilateral calf tenderness and swelling, ACE bandages wrapping both lower extremities  Psych: affect appropriate      Lab:  Lab Results   Component Value Date    WBC 8.0 08/27/2019    HGB 7.9 (L) 08/27/2019    HCT 29.2 (L)

## 2019-08-27 NOTE — PROGRESS NOTES
[facility] - Progress Note    Today's Date and Time: 8/27/2019, 7:44 AM    Impression :   · Nonhealing ulcers on both lower extremities. Hx of prior brown recluse spider bites. · Appearance of wounds raises the question of pyoderma gangrenosum. 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 Stop date 8-27-19  · Wound care  · Suggest consider doxycyline or Minocycline as treatment for Pyoderma. Using prednisone or cyclosporine would be problematic given her overall problems. Medical Decision Making/Summary/Discussion:8/27/2019     ·   Infection Control Recommendations   · [place] Precautions    Antimicrobial Stewardship Recommendations     · Simplification of therapy    Coordination of Outpatient Care:   · Estimated Length of IV antimicrobials: 8-27-19  · Patient will need Midline Catheter Insertion: No  · Patient will need PICC line Insertion:No  · Patient will need: Home IV , [place],  SNF,  LTAC:No  · Patient will need outpatient wound care:Yes    Chief complaint/reason for consultation:   · Nonhealing chronic bilateral extremity secondary to spider bites      History of Present Illness:   Michaela Arreola is a 77y.o.-year-old  female who was initially admitted on 8/19/2019. Patient seen at the request of Dr. Sagar Bishop:    Pt with a past medical history of hypertension, CKD stage III, CAD, morbid obesity, bilateral lower extremity lymphedema, history of spider  Bites who presented to the ED on 8//2019 because of chronic nonhealing bilateral lower extremity wounds with worsening pain and discharge on bilateral lower extremities, right more than left. She was admitted for the same wounds last year, at the onset of the bites.  She had sepsis during the hospital course and received antibiotics along with children: Not on file    Years of education: Not on file    Highest education level: Not on file   Occupational History    Not on file   Social Needs    Financial resource strain: Not on file    Food insecurity:     Worry: Not on file     Inability: Not on file    Transportation needs:     Medical: Not on file     Non-medical: Not on file   Tobacco Use    Smoking status: Former Smoker    Smokeless tobacco: Former User   Substance and Sexual Activity    Alcohol use: No    Drug use: No    Sexual activity: Never   Lifestyle    Physical activity:     Days per week: Not on file     Minutes per session: Not on file    Stress: Not on file   Relationships    Social connections:     Talks on phone: Not on file     Gets together: Not on file     Attends Zoroastrian service: Not on file     Active member of club or organization: Not on file     Attends meetings of clubs or organizations: Not on file     Relationship status: Not on file    Intimate partner violence:     Fear of current or ex partner: Not on file     Emotionally abused: Not on file     Physically abused: Not on file     Forced sexual activity: Not on file   Other Topics Concern    Not on file   Social History Narrative    Not on file       Family History:     Family History   Adopted: Yes   Problem Relation Age of Onset    Cancer Mother         Allergies:   Fruit & vegetable daily [nutritional supplements] and Aspirin     Review of Systems:   Constitutional: No fevers or chills. No systemic complaints  Head: No headaches  Eyes: No double vision or blurry vision. No conjunctival inflammation. ENT: No sore throat or runny nose. . No hearing loss, tinnitus or vertigo. Cardiovascular: No chest pain or palpitations. No shortness of breath. No GUTIÉRREZ  Lung: No shortness of breath or cough. No sputum production  Abdomen: No nausea, vomiting, diarrhea, or abdominal pain. Annie Eves No cramps.   Genitourinary: Pt states she still gets heavy periods  Musculoskeletal: No SPECIMEN 2 HAS BEEN SUBMITTED FOR MISMATCH REPAIR   ANALYSIS.  THE RESULT OF THIS ANALYSIS WILL FOLLOW IN A SEPARATE   REPORT.  FOR , THE SLIDES WERE REVIEWED BY OTHER   PATHOLOGISTS (GAGE, RACHEL, AZAM, ABDIEL).  THESE FINDINGS WERE DISCUSSED WITH   DR. Bob Luu ON 8/23/19.      Malcolm Reyes,   **Electronically Signed Out**         sls/8/23/2019     Medical Decision Making-Other:     Note:  · Labs, medications, radiologic studies were reviewed with personal review of films  · Moderate Large amounts of data were reviewed  · Discussed with nursing Staff, Discharge planner  · Infection Control and Prevention measures reviewed  · All prior entries were reviewed  · Administer medications as ordered  · Prognosis: Good  · Discharge planning reviewed  · Follow up as outpatient. Thank you for allowing us to participate in the care of this patient. Please call with questions. Claudetta Dao, KALYAN PGY1  Seen with Dr. Miranda Herring:    I have discussed the case, including pertinent history and exam findings with the residents. I have seen and examined the patient and the key elements of the encounter have been performed by me. I have reviewed the laboratory data, other diagnostic studies and discussed them with the residents. I have updated the medical record where necessary. I agree with the assessment, plan and orders as documented by the resident.     Kodi Antony MD.    Pager: [phone] - Office: [phone]

## 2019-08-28 ENCOUNTER — TELEPHONE (OUTPATIENT)
Dept: INFECTIOUS DISEASES | Age: 69
End: 2019-08-28

## 2019-08-28 LAB
CULTURE: ABNORMAL
CULTURE: ABNORMAL
DIRECT EXAM: ABNORMAL
DIRECT EXAM: ABNORMAL
Lab: ABNORMAL
SPECIMEN DESCRIPTION: ABNORMAL

## 2019-08-28 NOTE — PROGRESS NOTES
(08/22/2019); Dilation and curettage of uterus (N/A, 8/22/2019); and Cervix biopsy (N/A, 8/22/2019). Family History: family history includes Cancer in her mother. She was adopted. Social History:   reports that she has quit smoking. She has quit using smokeless tobacco. She reports that she does not drink alcohol or use drugs. Medications:    Prior to Admission medications    Medication Sig Start Date End Date Taking? Authorizing Provider   gabapentin (NEURONTIN) 300 MG capsule Take 1 capsule by mouth 3 times daily for 30 days. Take 300mg po nightly for 3 days, then 300mg po bid for 3 days, then 300mg po tid. 8/27/19 9/26/19 Yes Skyla Mays MD   ferrous sulfate 325 (65 Fe) MG EC tablet Take 1 tablet by mouth 2 times daily (with meals) 8/27/19  Yes Skyla Mays MD   zinc sulfate (ZINCATE) 220 (50 Zn) MG capsule Take 1 capsule by mouth daily 8/27/19  Yes Skyla Mays MD   vitamin C (ASCORBIC ACID) 500 MG tablet Take 1 tablet by mouth daily 8/27/19  Yes Skyla Mays MD   furosemide (LASIX) 20 MG tablet Take 1 tablet by mouth daily 8/27/19  Yes Skyla Mays MD   levothyroxine (SYNTHROID) 75 MCG tablet Take 75 mcg by mouth Daily   Yes Historical Provider, MD   cloNIDine (CATAPRES) 0.1 MG tablet Take 0.1 mg by mouth 2 times daily   Yes Historical Provider, MD   amLODIPine (NORVASC) 10 MG tablet Take 10 mg by mouth daily   Yes Historical Provider, MD   atorvastatin (LIPITOR) 10 MG tablet Take 10 mg by mouth daily   Yes Historical Provider, MD   allopurinol (ZYLOPRIM) 100 MG tablet Take 100 mg by mouth daily   Yes Historical Provider, MD   carvedilol (COREG) 25 MG tablet Take 1 tablet by mouth 2 times daily (with meals) 10/5/18  Yes Chris Monreal MD   pantoprazole (PROTONIX) 40 MG tablet Take 1 tablet by mouth daily 10/5/18  Yes Chris Monreal MD     No current facility-administered medications for this encounter.       Current Outpatient Medications   Medication Sig Dispense Refill    gabapentin (NEURONTIN) 300 MG capsule Take 1 capsule by mouth 3 times daily for 30 days. Take 300mg po nightly for 3 days, then 300mg po bid for 3 days, then 300mg po tid. 90 capsule 0    ferrous sulfate 325 (65 Fe) MG EC tablet Take 1 tablet by mouth 2 times daily (with meals) 60 tablet 3    zinc sulfate (ZINCATE) 220 (50 Zn) MG capsule Take 1 capsule by mouth daily 30 capsule 3    vitamin C (ASCORBIC ACID) 500 MG tablet Take 1 tablet by mouth daily 30 tablet 3    furosemide (LASIX) 20 MG tablet Take 1 tablet by mouth daily 30 tablet 3    levothyroxine (SYNTHROID) 75 MCG tablet Take 75 mcg by mouth Daily      cloNIDine (CATAPRES) 0.1 MG tablet Take 0.1 mg by mouth 2 times daily      amLODIPine (NORVASC) 10 MG tablet Take 10 mg by mouth daily      atorvastatin (LIPITOR) 10 MG tablet Take 10 mg by mouth daily      allopurinol (ZYLOPRIM) 100 MG tablet Take 100 mg by mouth daily      carvedilol (COREG) 25 MG tablet Take 1 tablet by mouth 2 times daily (with meals) 60 tablet 3    pantoprazole (PROTONIX) 40 MG tablet Take 1 tablet by mouth daily 30 tablet 3       Allergies:  Fruit & vegetable daily [nutritional supplements] and Aspirin    REVIEW OF SYSTEMS:      · General: Positive for weakness and fatigue. No unanticipated weight loss or decreased appetite. No fever or chills. · Eyes: No blurred vision, eye pain or double vision. · Ears: No hearing problems or drainage. No tinnitus. · Throat: No sore throat, problems with swallowing or dysphagia. · Respiratory: No cough, sputum or hemoptysis. No shortness of breath. No pleuritic chest pain. · Cardiovascular: No chest pain, orthopnea or PND. No lower extremity edema. No palpitation. · Gastrointestinal: No problems with swallowing. No abdominal pain or bloating. No nausea or vomiting. No diarrhea or constipation. No GI bleeding. · Genitourinary: As above.      · Musculoskeletal: Massive swelling  Extremities -massive lower extremities edema. He was lymphedema. Nonhealing ulcers. Skin - n nonhealing ulcers of the lower extremities. , no suspicious skin lesions noted           DATA:      Labs:       CBC:   Recent Labs     08/26/19  0548 08/26/19  1213 08/27/19  0550   WBC 8.5  --  8.0   HGB 8.5* 7.9* 7.9*   HCT 32.0* 28.8* 29.2*     --  171     BMP:   Recent Labs     08/26/19  0548 08/27/19  0550    138   K 4.7 4.7   CO2 27 26   BUN 43* 41*   CREATININE 1.99* 1.47*   LABGLOM 25* 35*   GLUCOSE 106* 95     PT/INR: No results for input(s): PROTIME, INR in the last 72 hours. APTT:No results for input(s): APTT in the last 72 hours. 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] 08/27/2019    Endometrial carcinoma ([facility] [address] [C54.1]     Multiple open wounds of lower extremity, right, initial encounter [S81.801A]     Acute anemia [D64.9] 08/22/2019  8/22/19 Pelvic EUA, Pap smear, D&C, Cystoscopy, proctosigmoidoscopy, Cervical myomectomy, EMBx  [Z98.890] 08/22/2019    Pedunculated cervical fibroid [D25.9] 08/22/2019    Enlarged fibroid uterus [D25.9] 08/21/2019    Abnormal uterine bleeding [N93.9] 08/20/2019    Leg ulcer, unspecified laterality, limited to breakdown of skin ([facility] [address] [L97.901] 08/20/2019    Infected wound [T14. 8XXA, L08.9] 08/19/2019    Nonhealing ulcer of multiple sites of right lower extremity ([facility] [address] [L97.919] 08/19/2019    HTN (hypertension) [I10] 08/19/2019    Lymphedema of both lower extremities [I89.0] 08/19/2019    CKD (chronic kidney disease) [N18.9] 08/19/2019    Morbid obesity ([facility] [address] [E66.01] 09/30/2018    Iron deficiency anemia [D50.9]     Blood loss anemia [D50.0] 09/23/2018  Bilateral leg pain [M79.604, M79.605]    Endometrial adenocarcinoma  Lymphedema of the lower extremities with chronic nonhealing ulcers. Chronic renal insufficiency  Chronic normocytic anemia    RECOMMENDATIONS:  1. Records and labs and images were reviewed and discussed with the patient. 2. Patient is having massive lower extremities edema and nonhealing ulcers. Surgery and ID following. 3. Discussed with the patient the results of the endometrial biopsy. Obviously positive for high-grade serous carcinoma. Biopsy was done by GYN oncology. Discussed with Dr. Jennifer Pena. Considering patient's body habitus and other health problems surgery is quite risky. 4. Currently there is no active bleeding. Consider radiation therapy for bleeding control. 5. Patient will have CT scan of the chest abdomen pelvis today. We will evaluate the results. 6. If discharge she will be seen as outpatient. 7. Patient's questions were answered to the best of her satisfaction and she verbalized full understanding and agreement. 8.   Discussed with patient and Nurse. MD Dane Dunham MD  Cell: [phone]    This note is created with the assistance of a speech recognition program.  While intending to generate a document that actually reflects the content of the visit, the document can still have some errors including those of syntax and sound a like substitutions which may escape proof reading. It such instances, actual meaning can be extrapolated by contextual diversion.

## 2019-08-28 NOTE — TELEPHONE ENCOUNTER
Ana Ma MD 8/28/2019 12:43 PM Grant Members   1950    [facility] RN ([phone]) from home care called to let you know that the patient has no IV access and she was unable to give her dose of IV ABX- she stated that it was only a 3 day IV push but she is just unable to get a good vein. She wants to know if we can call in oral ABX. Please review and advise!! Thank You Esequiel Morales Read 8/28/2019 12:48 PM 8/28/2019 12:52 PM They can stop the iv antibiotic      PC to Nurse to advise her to stop taking IV ABX. She voiced understanding.

## 2019-08-29 ENCOUNTER — TELEPHONE (OUTPATIENT)
Dept: RADIATION ONCOLOGY | Facility: MEDICAL CENTER | Age: 69
End: 2019-08-29

## 2019-08-29 ENCOUNTER — TELEPHONE (OUTPATIENT)
Dept: ONCOLOGY | Age: 69
End: 2019-08-29

## 2019-08-29 NOTE — TELEPHONE ENCOUNTER
I called Bekah to schedule consult. I offered her 9/4/19 she said no, next available was 9/5/19, she already has appointment that day. She agreed to 9/11/19 @ 2pm. Appointment card mailed.

## 2019-08-29 NOTE — TELEPHONE ENCOUNTER
Name: Wang Chris  : 1950  MRN: [id number]    Oncology Navigation- Initial Note:    Intake-  Contact Type: Telephone  Notes: Introductory phone call made to patient, instructed patient writer will be navigating oncology care & may call writer with any questions/concerns/issues prn @ [phone]. Discussed potential barriers to care, pt voiced financial concerns & transportation concerns. Instructed pt writer will contact [facility]/ , & request contact pt. [employer] resources, pt agreeable to referrals for [facility]. [employer] message sent to [facility]/ , requested Dr. Cecy Weeks post hospital f/u, pt scheduled 19 @ 1300. Pt updated on appt details. Instructed pt writer will contact RO/  & request contact pt to schedule post hospital RO f/u. Secure e-mail sent to [facility], notified of referral.  [facility] financial application completed & e-mailed to [facility]. Attempted to contact Pawel [facility], no answer, VM left updated on pt. Spoke with Rhys Course, updated on pt & requested pt contacted to schedule consult. [address] written materials along with writer's business card mailed to pt. Will continue to follow.     Electronically signed by Julian Feng RN on 2019 at 2:45 PM

## 2019-08-30 ENCOUNTER — TELEPHONE (OUTPATIENT)
Dept: INFUSION THERAPY | Facility: MEDICAL CENTER | Age: 69
End: 2019-08-30

## 2019-08-31 LAB
CULTURE: NORMAL
CULTURE: NORMAL
Lab: NORMAL
Lab: NORMAL
SPECIMEN DESCRIPTION: NORMAL
SPECIMEN DESCRIPTION: NORMAL

## 2019-09-03 ENCOUNTER — HOSPITAL ENCOUNTER (OUTPATIENT)
Facility: MEDICAL CENTER | Age: 69
End: 2019-09-03
Payer: MEDICARE

## 2019-09-03 ENCOUNTER — TELEPHONE (OUTPATIENT)
Dept: ONCOLOGY | Age: 69
End: 2019-09-03

## 2019-09-03 NOTE — TELEPHONE ENCOUNTER
SW called patient to follow-up on a referral from [place] [employer], as patient voices concerns with transportation. Patient states she has Medicare and Medicaid in place, but does not have rides through [address]. SW mailed patient the application and approved 30 days of rides through [facility], pending approval of her application. Program explained and questions answered. SW asked if she needed a ride set up for her appointment this Thursday and she states she plans to reschedule that appointment as she has a conflict. She voices no other needs at this time and was provided with SW contact information and encouraged to call with any further issues/concerns. Shannon Perez.  Gita Rolle

## 2019-09-05 ENCOUNTER — TELEPHONE (OUTPATIENT)
Dept: ONCOLOGY | Age: 69
End: 2019-09-05

## 2019-09-05 NOTE — TELEPHONE ENCOUNTER
MD REVIEWED AND SIGNED/ DATED [facility] CARE INTAKE REFERRAL FORM.   FAXED W/ CONFIRMATION TO 1 [phone]

## 2019-09-06 PROBLEM — S81.809D: Status: ACTIVE | Noted: 2018-09-23

## 2019-09-10 ENCOUNTER — HOSPITAL ENCOUNTER (OUTPATIENT)
Facility: MEDICAL CENTER | Age: 69
End: 2019-09-10
Payer: MEDICARE

## 2019-09-10 ENCOUNTER — TELEPHONE (OUTPATIENT)
Dept: RADIATION ONCOLOGY | Facility: MEDICAL CENTER | Age: 69
End: 2019-09-10

## 2019-09-10 ENCOUNTER — TELEPHONE (OUTPATIENT)
Dept: ONCOLOGY | Age: 69
End: 2019-09-10

## 2019-09-10 NOTE — TELEPHONE ENCOUNTER
RECEIVED VIA FAX FROM [address] [place] [address] Rd., [address] THE ADDENDUM TO FACE TO Saravanan Fulton.   REVIEWED AND SIGNED/ DATED PER MD/  FAX W CONFIRMATION TO 1 [phone]

## 2019-09-10 NOTE — TELEPHONE ENCOUNTER
[facility] HEALTHCARE PLAN OF CARE RECEIVED VIA FAX. MD REVIEWED / SIGNED/ DATED. FAXED BACK TO 1 [phone] W/ CONFIRMATION.

## 2019-09-13 ENCOUNTER — HOSPITAL ENCOUNTER (OUTPATIENT)
Dept: RADIATION ONCOLOGY | Facility: MEDICAL CENTER | Age: 69
Discharge: HOME | End: 2019-09-13
Payer: MEDICARE

## 2019-09-13 ENCOUNTER — TELEPHONE (OUTPATIENT)
Dept: ONCOLOGY | Age: 69
End: 2019-09-13

## 2019-09-13 ENCOUNTER — TELEPHONE (OUTPATIENT)
Dept: RADIATION ONCOLOGY | Facility: MEDICAL CENTER | Age: 69
End: 2019-09-13

## 2019-09-13 VITALS
BODY MASS INDEX: 51.91 KG/M2 | WEIGHT: 293 LBS | TEMPERATURE: 97.7 F | HEART RATE: 88 BPM | HEIGHT: 63 IN | RESPIRATION RATE: 20 BRPM | SYSTOLIC BLOOD PRESSURE: 177 MMHG | OXYGEN SATURATION: 93 % | DIASTOLIC BLOOD PRESSURE: 78 MMHG

## 2019-09-13 DIAGNOSIS — C54.1 PRIMARY SEROUS ADENOCARCINOMA OF ENDOMETRIUM (HCC): Primary | ICD-10-CM

## 2019-09-13 PROCEDURE — 99213 OFFICE O/P EST LOW 20 MIN: CPT | Performed by: RADIOLOGY

## 2019-09-13 NOTE — PROGRESS NOTES
PROCTOSIGMOIDOSCOPY, CERVICAL MYOMECTOMY performed by Sissy Sarkar MD at [facility]  2019    MYOMECTOMY  2019    CERVICAL MYOMECTOMY     MI OFFICE/OUTPT VISIT,PROCEDURE ONLY Bilateral 2018    SHARP EXCISIONAL DEBRIDEMENT BILATERAL LOWER EXTREMITIES ULCERS X4 WITH SCALPEL AND FORCEPS performed by Negrito Esteves, DO at [facility] [address]  2019    PROCTOSIGMOIDOSCOPY    TUBAL LIGATION  1974       Family History   Adopted: Yes   Problem Relation Age of Onset    Cancer Mother        Social History     Socioeconomic History    Marital status: Single     Spouse name: Not on file    Number of children: Not on file    Years of education: Not on file    Highest education level: Not on file   Occupational History    Not on file   Social Needs    Financial resource strain: Not on file    Food insecurity:     Worry: Not on file     Inability: Not on file    Transportation needs:     Medical: Not on file     Non-medical: Not on file   Tobacco Use    Smoking status: Former Smoker     Types: Cigarettes     Last attempt to quit: 2014     Years since quittin.0    Smokeless tobacco: Former User   Substance and Sexual Activity    Alcohol use: No    Drug use: No    Sexual activity: Never   Lifestyle    Physical activity:     Days per week: Not on file     Minutes per session: Not on file    Stress: Not on file   Relationships    Social connections:     Talks on phone: Not on file     Gets together: Not on file     Attends Taoist service: Not on file     Active member of club or organization: Not on file     Attends meetings of clubs or organizations: Not on file     Relationship status: Not on file    Intimate partner violence:     Fear of current or ex partner: Not on file     Emotionally abused: Not on file     Physically abused: Not on file     Forced sexual activity: Not on file   Other Topics Concern    Not on file   Social History Narrative  Not on file             FALLS RISK SCREEN  Instructions:  Assess the patient and enter the appropriate indicators that are present for fall risk identification. Total the numbers entered and assign a fall risk score from Table 2.  Reassess patient at a minimum every 12 weeks or with status change. Assessment   Date  9/13/2019     1. Mental Ability: confusion/cognitively impaired 0     2. Elimination Issues: incontinence, frequency 0       3. Ambulatory: use of assistive devices (walker, cane, off-loading devices),        attached to equipment (IV pole, oxygen) 0     4. Sensory Limitations: dizziness, vertigo, impaired vision 0     5. Age less than 65        0     6. Age 72 or greater 0     7. Medication: diuretics, strong analgesics, hypnotics, sedatives,        antihypertensive agents 0   8. Falls:  recent history of falls within the last 3 months (not to include slipping or        tripping) 0   TOTAL 0    If score of 4 or greater was education given? No       TABLE 2   Risk Score Risk Level Plan of Care   0-3 Little or  No Risk 1. Provide assistance as indicated for ambulation activities  2. Reorient confused/cognitively impaired patient  3. Call-light/bell within patient's reach  4. Chair/bed in low position, stretcher/bed with siderails up except when performing patient care activities  5. Educate patient/family/caregiver on falls prevention  6.  Reassess in 12 weeks or with any noted change in patient condition which places them at a risk for a fall   4-6 Moderate Risk 1. Provide assistance as indicated for ambulation activities  2. Reorient confused/cognitively impaired patient  3. Call-light/bell within patient's reach  4. Chair/bed in low position, stretcher/bed with siderails up except when performing patient care activities  5. Educate patient/family/caregiver on falls prevention     7 or   Higher High Risk 1. Place patient in easily observable treatment room  2.   Patient attended at all times by family member or staff  3. Provide assistance as indicated for ambulation activities  4. Reorient confused/cognitively impaired patient  5. Call-light/bell within patient's reach  6. Chair/bed in low position, stretcher/bed with siderails up except when performing patient care activities  7. Educate patient/family/caregiver on falls prevention             Assessment/Plan: Patient was seen today for consultation.          Willis Solis 9/13/2019 8:10 AM

## 2019-09-13 NOTE — PROGRESS NOTES
8th Edition  - Clinical stage from 8/22/2019: FIGO Stage IIIC1 (cT1a, cN1, cM0) - Signed by Albin Bui MD on 9/13/2019    The patient is a 70-year-old female who has a diagnosis of stage IIIC1, T1a N1 M0 serous adenocarcinoma of the uterus identified in an endometrial polyp. The patient is not a candidate for surgical resection. This is related to her health issues including obesity and chronic leg edema with skin ulcerations. The patient did have a CT scan of the chest, abdomen, and pelvis without contrast for staging. This showed enlarged lymph nodes in the pelvic area. There is no definite evidence of metastasis elsewhere. I will send the patient for a PET/CT scan for staging purposes. Since she has renal insufficiency, she cannot receive IV contrast with a CAT scan. This limits the ability of this study to adequately stage the patient. I discussed this issue with the patient and caretaker. They are agreeable to take the patient for the PET/CT scan. Clinically, the patient staging is noted above based on the CT scan results. The patient will return to see me for a reports visit. At that time, a plan regarding treatment will be made. I did outline definitive external beam radiation therapy and brachytherapy for the treatment of endometrial cancer. I went over the side effects of this treatment. I reviewed the logistics also. Thank you for allowing me to see this very pleasant patient.      [address]:  Patient Care Team:  [facility] as PCP - General  Rolando Diop MD as Consulting Physician (Gastroenterology)  Albin Bui MD as Consulting Physician (Radiation Oncology)  Dudley Julien, RN as Nurse Navigator (Oncology)     Drugs Prescribed:  New Prescriptions    No medications on file       Orders Placed:     Orders Placed This Encounter   Procedures    PET CT Skull Base To Mid Thigh

## 2019-09-19 ENCOUNTER — HOSPITAL ENCOUNTER (OUTPATIENT)
Facility: MEDICAL CENTER | Age: 69
End: 2019-09-19
Payer: MEDICARE

## 2019-09-20 ENCOUNTER — HOSPITAL ENCOUNTER (OUTPATIENT)
Dept: NUCLEAR MEDICINE | Age: 69
Discharge: HOME | End: 2019-09-22
Payer: MEDICARE

## 2019-09-20 DIAGNOSIS — C54.1 PRIMARY SEROUS ADENOCARCINOMA OF ENDOMETRIUM (HCC): ICD-10-CM

## 2019-09-20 PROCEDURE — 78815 PET IMAGE W/CT SKULL-THIGH: CPT

## 2019-09-20 PROCEDURE — A9552 F18 FDG: HCPCS | Performed by: RADIOLOGY

## 2019-09-20 PROCEDURE — 3430000000 HC RX DIAGNOSTIC RADIOPHARMACEUTICAL: Performed by: RADIOLOGY

## 2019-09-20 RX ADMIN — FLUDEOXYGLUCOSE F 18 12.99 MILLICURIE: 200 INJECTION, SOLUTION INTRAVENOUS at 12:00

## 2019-09-21 RX ORDER — FLUDEOXYGLUCOSE F 18 200 MCI/ML
12.99 INJECTION, SOLUTION INTRAVENOUS
Status: COMPLETED | OUTPATIENT
Start: 2019-09-21 | End: 2019-09-20

## 2019-09-24 ENCOUNTER — TELEPHONE (OUTPATIENT)
Dept: ONCOLOGY | Age: 69
End: 2019-09-24

## 2019-09-24 ENCOUNTER — TELEPHONE (OUTPATIENT)
Dept: GYNECOLOGIC ONCOLOGY | Age: 69
End: 2019-09-24

## 2019-09-24 DIAGNOSIS — C54.1 PRIMARY SEROUS ADENOCARCINOMA OF ENDOMETRIUM (HCC): Primary | ICD-10-CM

## 2019-09-27 ENCOUNTER — HOSPITAL ENCOUNTER (OUTPATIENT)
Dept: RADIATION ONCOLOGY | Facility: MEDICAL CENTER | Age: 69
End: 2019-09-27
Payer: MEDICARE

## 2019-10-04 ENCOUNTER — HOSPITAL ENCOUNTER (OUTPATIENT)
Dept: RADIATION ONCOLOGY | Facility: MEDICAL CENTER | Age: 69
Discharge: HOME | End: 2019-10-04
Payer: MEDICARE

## 2019-10-04 ENCOUNTER — TELEPHONE (OUTPATIENT)
Dept: ONCOLOGY | Age: 69
End: 2019-10-04

## 2019-10-04 VITALS
RESPIRATION RATE: 16 BRPM | TEMPERATURE: 97.5 F | WEIGHT: 293 LBS | OXYGEN SATURATION: 94 % | BODY MASS INDEX: 60.63 KG/M2 | DIASTOLIC BLOOD PRESSURE: 71 MMHG | SYSTOLIC BLOOD PRESSURE: 199 MMHG | HEART RATE: 95 BPM

## 2019-10-04 DIAGNOSIS — I89.0 LYMPHEDEMA OF BOTH LOWER EXTREMITIES: ICD-10-CM

## 2019-10-04 DIAGNOSIS — E66.01 MORBID OBESITY (HCC): Chronic | ICD-10-CM

## 2019-10-04 DIAGNOSIS — C54.1 ENDOMETRIAL CARCINOMA (HCC): Primary | ICD-10-CM

## 2019-10-04 PROCEDURE — 99212 OFFICE O/P EST SF 10 MIN: CPT | Performed by: RADIOLOGY

## 2019-10-04 PROCEDURE — 99215 OFFICE O/P EST HI 40 MIN: CPT | Performed by: RADIOLOGY

## 2019-10-04 PROCEDURE — 99211 OFF/OP EST MAY X REQ PHY/QHP: CPT | Performed by: RADIOLOGY

## 2019-10-04 PROCEDURE — 92511 NASOPHARYNGOSCOPY: CPT | Performed by: RADIOLOGY

## 2019-10-04 PROCEDURE — 99213 OFFICE O/P EST LOW 20 MIN: CPT | Performed by: RADIOLOGY

## 2019-10-04 PROCEDURE — 31505 DIAGNOSTIC LARYNGOSCOPY: CPT | Performed by: RADIOLOGY

## 2019-10-04 PROCEDURE — 31575 DIAGNOSTIC LARYNGOSCOPY: CPT | Performed by: RADIOLOGY

## 2019-10-04 PROCEDURE — 99214 OFFICE O/P EST MOD 30 MIN: CPT | Performed by: RADIOLOGY

## 2019-10-04 ASSESSMENT — ENCOUNTER SYMPTOMS
VOMITING: 0
BLOOD IN STOOL: 0
COUGH: 0
VOICE CHANGE: 0
DIARRHEA: 0
SHORTNESS OF BREATH: 1
NAUSEA: 0
CONSTIPATION: 0
ABDOMINAL PAIN: 0
TROUBLE SWALLOWING: 0
SORE THROAT: 0

## 2019-10-04 ASSESSMENT — PAIN DESCRIPTION - ONSET: ONSET: ON-GOING

## 2019-10-04 ASSESSMENT — PAIN DESCRIPTION - ORIENTATION: ORIENTATION: LEFT;RIGHT

## 2019-10-04 ASSESSMENT — PAIN DESCRIPTION - FREQUENCY: FREQUENCY: INTERMITTENT

## 2019-10-04 ASSESSMENT — PAIN DESCRIPTION - LOCATION: LOCATION: LEG

## 2019-10-04 ASSESSMENT — PAIN SCALES - GENERAL: PAINLEVEL_OUTOF10: 10

## 2019-10-11 ENCOUNTER — TELEPHONE (OUTPATIENT)
Dept: ONCOLOGY | Age: 69
End: 2019-10-11

## 2019-10-17 ENCOUNTER — HOSPITAL ENCOUNTER (OUTPATIENT)
Facility: MEDICAL CENTER | Age: 69
End: 2019-10-17
Payer: MEDICARE

## 2019-10-17 PROBLEM — L88 PYODERMA GANGRENOSUM: Status: ACTIVE | Noted: 2019-10-17

## 2019-10-23 ENCOUNTER — TELEPHONE (OUTPATIENT)
Dept: ONCOLOGY | Age: 69
End: 2019-10-23

## 2019-10-23 ENCOUNTER — TELEPHONE (OUTPATIENT)
Dept: RADIATION ONCOLOGY | Facility: MEDICAL CENTER | Age: 69
End: 2019-10-23

## 2019-10-24 ENCOUNTER — TELEPHONE (OUTPATIENT)
Dept: GYNECOLOGIC ONCOLOGY | Age: 69
End: 2019-10-24

## 2019-10-24 DIAGNOSIS — C54.1 PRIMARY SEROUS ADENOCARCINOMA OF ENDOMETRIUM (HCC): Primary | ICD-10-CM

## 2019-10-28 ENCOUNTER — TELEPHONE (OUTPATIENT)
Dept: RADIATION ONCOLOGY | Facility: MEDICAL CENTER | Age: 69
End: 2019-10-28

## 2019-11-06 ENCOUNTER — TELEPHONE (OUTPATIENT)
Dept: ONCOLOGY | Age: 69
End: 2019-11-06

## 2019-11-12 ENCOUNTER — TELEPHONE (OUTPATIENT)
Dept: RADIATION ONCOLOGY | Facility: MEDICAL CENTER | Age: 69
End: 2019-11-12

## 2019-11-12 ENCOUNTER — TELEPHONE (OUTPATIENT)
Dept: ONCOLOGY | Age: 69
End: 2019-11-12

## 2020-01-03 ENCOUNTER — TELEPHONE (OUTPATIENT)
Dept: ONCOLOGY | Age: 70
End: 2020-01-03

## 2020-01-03 NOTE — TELEPHONE ENCOUNTER
Paperwork for [facility] signed by md and faxed back to agency   Confirmation rec'd

## 2020-01-22 ENCOUNTER — TELEPHONE (OUTPATIENT)
Dept: RADIATION ONCOLOGY | Facility: MEDICAL CENTER | Age: 70
End: 2020-01-22

## 2020-01-22 NOTE — TELEPHONE ENCOUNTER
Dr. Gabi Teixeira formed letter for patient due to her not coming back for appointment for radiation treatments. This letter is in mosaiq and mailed to the patient today at her home with our business card attached.

## 2020-02-28 ENCOUNTER — HOSPITAL ENCOUNTER (INPATIENT)
Age: 70
LOS: 1 days | Discharge: HOME HEALTH | DRG: 812 | End: 2020-02-29
Attending: EMERGENCY MEDICINE | Admitting: INTERNAL MEDICINE
Payer: MEDICARE

## 2020-02-28 ENCOUNTER — APPOINTMENT (OUTPATIENT)
Dept: GENERAL RADIOLOGY | Age: 70
DRG: 812 | End: 2020-02-28
Payer: MEDICARE

## 2020-02-28 LAB
ABSOLUTE EOS #: 0.17 K/UL (ref 0–0.44)
ABSOLUTE IMMATURE GRANULOCYTE: 0.09 K/UL (ref 0–0.3)
ABSOLUTE LYMPH #: 1.31 K/UL (ref 1.1–3.7)
ABSOLUTE MONO #: 0.61 K/UL (ref 0.1–1.2)
ANION GAP SERPL CALCULATED.3IONS-SCNC: 12 MMOL/L (ref 9–17)
BASOPHILS # BLD: 0 % (ref 0–2)
BASOPHILS ABSOLUTE: 0 K/UL (ref 0–0.2)
BNP INTERPRETATION: ABNORMAL
BUN BLDV-MCNC: 16 MG/DL (ref 8–23)
BUN/CREAT BLD: ABNORMAL (ref 9–20)
CALCIUM SERPL-MCNC: 8.8 MG/DL (ref 8.6–10.4)
CHLORIDE BLD-SCNC: 100 MMOL/L (ref 98–107)
CO2: 23 MMOL/L (ref 20–31)
CREAT SERPL-MCNC: 1.1 MG/DL (ref 0.5–0.9)
DIFFERENTIAL TYPE: ABNORMAL
EOSINOPHILS RELATIVE PERCENT: 2 % (ref 1–4)
GFR AFRICAN AMERICAN: 59 ML/MIN
GFR NON-AFRICAN AMERICAN: 49 ML/MIN
GFR SERPL CREATININE-BSD FRML MDRD: ABNORMAL ML/MIN/{1.73_M2}
GFR SERPL CREATININE-BSD FRML MDRD: ABNORMAL ML/MIN/{1.73_M2}
GLUCOSE BLD-MCNC: 101 MG/DL (ref 70–99)
HCT VFR BLD CALC: 22.1 % (ref 36.3–47.1)
HCT VFR BLD CALC: 25.8 % (ref 36.3–47.1)
HEMOGLOBIN: 5.5 G/DL (ref 11.9–15.1)
HEMOGLOBIN: 6.7 G/DL (ref 11.9–15.1)
IMMATURE GRANULOCYTES: 1 %
LYMPHOCYTES # BLD: 15 % (ref 24–43)
MCH RBC QN AUTO: 16.4 PG (ref 25.2–33.5)
MCHC RBC AUTO-ENTMCNC: 24.9 G/DL (ref 28.4–34.8)
MCV RBC AUTO: 66 FL (ref 82.6–102.9)
MONOCYTES # BLD: 7 % (ref 3–12)
MORPHOLOGY: ABNORMAL
NRBC AUTOMATED: 0.2 PER 100 WBC
PDW BLD-RTO: 21.2 % (ref 11.8–14.4)
PLATELET # BLD: 392 K/UL (ref 138–453)
PLATELET ESTIMATE: ABNORMAL
PMV BLD AUTO: 9.6 FL (ref 8.1–13.5)
POTASSIUM SERPL-SCNC: 4.2 MMOL/L (ref 3.7–5.3)
PRO-BNP: 2291 PG/ML
RBC # BLD: 3.35 M/UL (ref 3.95–5.11)
RBC # BLD: ABNORMAL 10*6/UL
SEG NEUTROPHILS: 75 % (ref 36–65)
SEGMENTED NEUTROPHILS ABSOLUTE COUNT: 6.52 K/UL (ref 1.5–8.1)
SODIUM BLD-SCNC: 135 MMOL/L (ref 135–144)
TROPONIN INTERP: ABNORMAL
TROPONIN INTERP: ABNORMAL
TROPONIN T: ABNORMAL NG/ML
TROPONIN T: ABNORMAL NG/ML
TROPONIN, HIGH SENSITIVITY: 18 NG/L (ref 0–14)
TROPONIN, HIGH SENSITIVITY: 18 NG/L (ref 0–14)
WBC # BLD: 8.7 K/UL (ref 3.5–11.3)
WBC # BLD: ABNORMAL 10*3/UL

## 2020-02-28 PROCEDURE — 85018 HEMOGLOBIN: CPT

## 2020-02-28 PROCEDURE — 2580000003 HC RX 258: Performed by: STUDENT IN AN ORGANIZED HEALTH CARE EDUCATION/TRAINING PROGRAM

## 2020-02-28 PROCEDURE — 85014 HEMATOCRIT: CPT

## 2020-02-28 PROCEDURE — 80048 BASIC METABOLIC PNL TOTAL CA: CPT

## 2020-02-28 PROCEDURE — 86901 BLOOD TYPING SEROLOGIC RH(D): CPT

## 2020-02-28 PROCEDURE — 99285 EMERGENCY DEPT VISIT HI MDM: CPT

## 2020-02-28 PROCEDURE — 84484 ASSAY OF TROPONIN QUANT: CPT

## 2020-02-28 PROCEDURE — 86900 BLOOD TYPING SEROLOGIC ABO: CPT

## 2020-02-28 PROCEDURE — 93005 ELECTROCARDIOGRAM TRACING: CPT

## 2020-02-28 PROCEDURE — 83880 ASSAY OF NATRIURETIC PEPTIDE: CPT

## 2020-02-28 PROCEDURE — 36430 TRANSFUSION BLD/BLD COMPNT: CPT

## 2020-02-28 PROCEDURE — P9016 RBC LEUKOCYTES REDUCED: HCPCS

## 2020-02-28 PROCEDURE — P9040 RBC LEUKOREDUCED IRRADIATED: HCPCS

## 2020-02-28 PROCEDURE — 1200000000 HC SEMI PRIVATE

## 2020-02-28 PROCEDURE — 85025 COMPLETE CBC W/AUTO DIFF WBC: CPT

## 2020-02-28 PROCEDURE — 71046 X-RAY EXAM CHEST 2 VIEWS: CPT

## 2020-02-28 PROCEDURE — 86920 COMPATIBILITY TEST SPIN: CPT

## 2020-02-28 PROCEDURE — 86850 RBC ANTIBODY SCREEN: CPT

## 2020-02-28 RX ORDER — PANTOPRAZOLE SODIUM 40 MG/1
40 TABLET, DELAYED RELEASE ORAL DAILY
Status: DISCONTINUED | OUTPATIENT
Start: 2020-02-29 | End: 2020-02-29 | Stop reason: HOSPADM

## 2020-02-28 RX ORDER — ZINC SULFATE 50(220)MG
220 CAPSULE ORAL DAILY
Status: DISCONTINUED | OUTPATIENT
Start: 2020-02-29 | End: 2020-02-29 | Stop reason: HOSPADM

## 2020-02-28 RX ORDER — GABAPENTIN 300 MG/1
300 CAPSULE ORAL 3 TIMES DAILY
Status: DISCONTINUED | OUTPATIENT
Start: 2020-02-28 | End: 2020-02-29 | Stop reason: HOSPADM

## 2020-02-28 RX ORDER — PROMETHAZINE HYDROCHLORIDE 25 MG/1
12.5 TABLET ORAL EVERY 6 HOURS PRN
Status: DISCONTINUED | OUTPATIENT
Start: 2020-02-28 | End: 2020-02-29 | Stop reason: HOSPADM

## 2020-02-28 RX ORDER — ATORVASTATIN CALCIUM 10 MG/1
10 TABLET, FILM COATED ORAL DAILY
Status: DISCONTINUED | OUTPATIENT
Start: 2020-02-29 | End: 2020-02-29 | Stop reason: HOSPADM

## 2020-02-28 RX ORDER — ALLOPURINOL 100 MG/1
100 TABLET ORAL DAILY
Status: DISCONTINUED | OUTPATIENT
Start: 2020-02-29 | End: 2020-02-29 | Stop reason: HOSPADM

## 2020-02-28 RX ORDER — CARVEDILOL 25 MG/1
25 TABLET ORAL 2 TIMES DAILY WITH MEALS
Status: DISCONTINUED | OUTPATIENT
Start: 2020-02-29 | End: 2020-02-29 | Stop reason: HOSPADM

## 2020-02-28 RX ORDER — CLONIDINE HYDROCHLORIDE 0.1 MG/1
0.1 TABLET ORAL 2 TIMES DAILY
Status: DISCONTINUED | OUTPATIENT
Start: 2020-02-28 | End: 2020-02-29 | Stop reason: HOSPADM

## 2020-02-28 RX ORDER — LANOLIN ALCOHOL/MO/W.PET/CERES
325 CREAM (GRAM) TOPICAL 2 TIMES DAILY WITH MEALS
Status: DISCONTINUED | OUTPATIENT
Start: 2020-02-29 | End: 2020-02-29 | Stop reason: HOSPADM

## 2020-02-28 RX ORDER — SODIUM CHLORIDE 0.9 % (FLUSH) 0.9 %
10 SYRINGE (ML) INJECTION EVERY 12 HOURS SCHEDULED
Status: DISCONTINUED | OUTPATIENT
Start: 2020-02-28 | End: 2020-02-29 | Stop reason: HOSPADM

## 2020-02-28 RX ORDER — SODIUM CHLORIDE 0.9 % (FLUSH) 0.9 %
10 SYRINGE (ML) INJECTION PRN
Status: DISCONTINUED | OUTPATIENT
Start: 2020-02-28 | End: 2020-02-29 | Stop reason: HOSPADM

## 2020-02-28 RX ORDER — FUROSEMIDE 20 MG/1
20 TABLET ORAL DAILY
Status: DISCONTINUED | OUTPATIENT
Start: 2020-02-29 | End: 2020-02-28

## 2020-02-28 RX ORDER — AMLODIPINE BESYLATE 10 MG/1
10 TABLET ORAL DAILY
Status: DISCONTINUED | OUTPATIENT
Start: 2020-02-29 | End: 2020-02-29 | Stop reason: HOSPADM

## 2020-02-28 RX ORDER — ACETAMINOPHEN 325 MG/1
650 TABLET ORAL EVERY 4 HOURS PRN
Status: DISCONTINUED | OUTPATIENT
Start: 2020-02-28 | End: 2020-02-29 | Stop reason: HOSPADM

## 2020-02-28 RX ORDER — ASCORBIC ACID 500 MG
500 TABLET ORAL DAILY
Status: DISCONTINUED | OUTPATIENT
Start: 2020-02-29 | End: 2020-02-29 | Stop reason: HOSPADM

## 2020-02-28 RX ORDER — SODIUM CHLORIDE 9 MG/ML
INJECTION, SOLUTION INTRAVENOUS CONTINUOUS
Status: DISCONTINUED | OUTPATIENT
Start: 2020-02-28 | End: 2020-02-29 | Stop reason: HOSPADM

## 2020-02-28 RX ORDER — ONDANSETRON 2 MG/ML
4 INJECTION INTRAMUSCULAR; INTRAVENOUS EVERY 6 HOURS PRN
Status: DISCONTINUED | OUTPATIENT
Start: 2020-02-28 | End: 2020-02-29 | Stop reason: HOSPADM

## 2020-02-28 RX ORDER — LEVOTHYROXINE SODIUM 0.07 MG/1
75 TABLET ORAL DAILY
Status: DISCONTINUED | OUTPATIENT
Start: 2020-02-29 | End: 2020-02-29 | Stop reason: HOSPADM

## 2020-02-28 RX ORDER — 0.9 % SODIUM CHLORIDE 0.9 %
20 INTRAVENOUS SOLUTION INTRAVENOUS ONCE
Status: COMPLETED | OUTPATIENT
Start: 2020-02-28 | End: 2020-02-29

## 2020-02-28 RX ADMIN — SODIUM CHLORIDE 20 ML: 9 INJECTION, SOLUTION INTRAVENOUS at 23:30

## 2020-02-28 ASSESSMENT — ENCOUNTER SYMPTOMS
COUGH: 0
STRIDOR: 0
ABDOMINAL PAIN: 0
VOMITING: 0
BLOOD IN STOOL: 0
NAUSEA: 0
SHORTNESS OF BREATH: 1
BACK PAIN: 0
PHOTOPHOBIA: 0

## 2020-02-28 ASSESSMENT — PAIN SCALES - GENERAL: PAINLEVEL_OUTOF10: 0

## 2020-02-28 NOTE — ED PROVIDER NOTES
[facility] ED  Emergency Department Encounter  EmergencyMedicine Resident     Pt Imani Rodriguez  MRN: [id number]  [place] 1950  Date of evaluation: 2/28/20  PCP:  Haris MEDINA    CHIEF COMPLAINT       Chief Complaint   Patient presents with    Other     sent for low hemoglobin       HISTORY OF PRESENT ILLNESS  (Location/Symptom, Timing/Onset, Context/Setting, Quality, Duration, Modifying Factors, Severity.)      Zainab Perez is a 68 y.o. female who presents with low hemoglobin. Patient had routine outpatient labs which showed a hemoglobin of 5 according to patient. Patient has a history of endometrial carcinoma with longstanding history of vaginal bleeding last 8 months. Patient arrived by EMS. During transport patient noticed increased work of breathing, chest pain which has since resolved. No history of coronary artery disease, no history of DVT or PE. Does not wear oxygen at home. Patient was placed on 2 L nasal cannula by EMS due to tachypnea, patient family states is likely due to more anxiety of having to come to the hospital.  Patient states that she no longer has vaginal bleeding. She is not on blood thinning medications. PAST MEDICAL / SURGICAL / SOCIAL / FAMILY HISTORY      has a past medical history of SAM (acute kidney injury) ([address] Endometrial carcinoma ([address] Heart attack ([address] HTN (hypertension), Morbid obesity ([address] and Skin lesions. has a past surgical history that includes Tubal ligation (1974); pr office/outpt visit,procedure only (Bilateral, 9/24/2018); Dilation and curettage of uterus (08/22/2019); myomectomy (08/22/2019); sigmoidoscopy (08/22/2019); Endometrial biopsy (08/22/2019); Dilation and curettage of uterus (N/A, 8/22/2019); and Cervix biopsy (N/A, 8/22/2019).     Social History     Socioeconomic History    Marital status: Single     Spouse name: Not on file    Number of children: Not on file    Years of education: Not on file    Highest capsule by mouth daily 8/27/19   Jo Cboos MD   vitamin C (ASCORBIC ACID) 500 MG tablet Take 1 tablet by mouth daily 8/27/19   Jo Cobos MD   furosemide (LASIX) 20 MG tablet Take 1 tablet by mouth daily 8/27/19   Jo Cobos MD   levothyroxine (SYNTHROID) 75 MCG tablet Take 75 mcg by mouth Daily    Historical Provider, MD   cloNIDine (CATAPRES) 0.1 MG tablet Take 0.1 mg by mouth 2 times daily    Historical Provider, MD   amLODIPine (NORVASC) 10 MG tablet Take 10 mg by mouth daily    Historical Provider, MD   atorvastatin (LIPITOR) 10 MG tablet Take 10 mg by mouth daily    Historical Provider, MD   allopurinol (ZYLOPRIM) 100 MG tablet Take 100 mg by mouth daily    Historical Provider, MD   carvedilol (COREG) 25 MG tablet Take 1 tablet by mouth 2 times daily (with meals) 10/5/18   Magen Parada MD   pantoprazole (PROTONIX) 40 MG tablet Take 1 tablet by mouth daily 10/5/18   Magen Parada MD       REVIEW OF SYSTEMS    (2-9 systems for level 4, 10 or more for level 5)      Review of Systems   Constitutional: Positive for fatigue. Negative for chills and fever. HENT: Negative. Eyes: Negative for photophobia and visual disturbance. Respiratory: Positive for shortness of breath. Negative for cough and stridor. Cardiovascular: Positive for chest pain and leg swelling. Negative for palpitations. Gastrointestinal: Negative for abdominal pain, blood in stool, nausea and vomiting. Genitourinary: Negative for decreased urine volume and vaginal bleeding. Musculoskeletal: Negative for back pain, neck pain and neck stiffness. Skin: Negative for rash. Allergic/Immunologic: Positive for food allergies. Negative for environmental allergies. Hematological: Negative for adenopathy. Psychiatric/Behavioral: Negative for confusion.        PHYSICAL EXAM   (up to 7 for level 4, 8 or more for level 5)      INITIAL VITALS:   BP (!) 173/75   Pulse 94   Temp 98 °F (36.7 °C) (Oral)   Resp 20   SpO2 100%     Physical Exam  Constitutional:       General: She is not in acute distress. Appearance: She is not diaphoretic. HENT:      Head: Normocephalic and atraumatic. Eyes:      Pupils: Pupils are equal, round, and reactive to light. Neck:      Musculoskeletal: Normal range of motion and neck supple. Trachea: No tracheal deviation. Cardiovascular:      Rate and Rhythm: Regular rhythm. Tachycardia present. Pulmonary:      Effort: Pulmonary effort is normal. Tachypnea present. No respiratory distress. Abdominal:      General: There is no distension. Palpations: Abdomen is soft. Musculoskeletal: Normal range of motion. Right lower leg: Edema present. Left lower leg: Edema present. Skin:     General: Skin is warm. Neurological:      Mental Status: She is oriented to person, place, and time. DIFFERENTIAL  DIAGNOSIS     PLAN (LABS / IMAGING / EKG):  Orders Placed This Encounter   Procedures    XR CHEST STANDARD (2 VW)    CBC WITH AUTO DIFFERENTIAL    BASIC METABOLIC PANEL    Brain Natriuretic Peptide    Troponin    Hemoglobin and Hematocrit, Blood, Post Transfusion    Troponin    VITAL SIGNS PER TRANSFUSION PROTOCOL    Verify informed consent    TRANSFUSION REACTION MANAGEMENT    Inpatient consult to Internal Medicine    EKG 12 Lead    TYPE AND SCREEN    PREPARE RBC (CROSSMATCH), 1 Units       MEDICATIONS ORDERED:  No orders of the defined types were placed in this encounter.       DIAGNOSTIC RESULTS / EMERGENCY DEPARTMENT COURSE / MDM     LABS:  Results for orders placed or performed during the hospital encounter of 02/28/20   CBC WITH AUTO DIFFERENTIAL   Result Value Ref Range    WBC 8.7 3.5 - 11.3 k/uL    RBC 3.35 (L) 3.95 - 5.11 m/uL    Hemoglobin 5.5 (LL) 11.9 - 15.1 g/dL    Hematocrit 22.1 (L) 36.3 - 47.1 %    MCV 66.0 (L) 82.6 - 102.9 fL    MCH 16.4 (L) 25.2 - 33.5 pg    MCHC 24.9 (L) 28.4 - 34.8 g/dL    RDW 21.2 (H) 11.8 - 14.4 %    Platelets 322 724 - 553 k/uL    MPV 9.6 8.1 - 13.5 fL    NRBC Automated 0.2 (H) 0.0 per 100 WBC    Differential Type NOT REPORTED     WBC Morphology NOT REPORTED     RBC Morphology NOT REPORTED     Platelet Estimate NOT REPORTED     Immature Granulocytes 1 (H) 0 %    Seg Neutrophils 75 (H) 36 - 65 %    Lymphocytes 15 (L) 24 - 43 %    Monocytes 7 3 - 12 %    Eosinophils % 2 1 - 4 %    Basophils 0 0 - 2 %    Absolute Immature Granulocyte 0.09 0.00 - 0.30 k/uL    Segs Absolute 6.52 1.50 - 8.10 k/uL    Absolute Lymph # 1.31 1.10 - 3.70 k/uL    Absolute Mono # 0.61 0.10 - 1.20 k/uL    Absolute Eos # 0.17 0.00 - 0.44 k/uL    Basophils Absolute 0.00 0.00 - 0.20 k/uL    Morphology MICROCYTOSIS PRESENT     Morphology ANISOCYTOSIS PRESENT     Morphology HYPOCHROMIA PRESENT    BASIC METABOLIC PANEL   Result Value Ref Range    Glucose 101 (H) 70 - 99 mg/dL    BUN 16 8 - 23 mg/dL    CREATININE 1.10 (H) 0.50 - 0.90 mg/dL    Bun/Cre Ratio NOT REPORTED 9 - 20    Calcium 8.8 8.6 - 10.4 mg/dL    Sodium 135 135 - 144 mmol/L    Potassium 4.2 3.7 - 5.3 mmol/L    Chloride 100 98 - 107 mmol/L    CO2 23 20 - 31 mmol/L    Anion Gap 12 9 - 17 mmol/L    GFR Non-African American 49 (L) >60 mL/min    GFR  59 (L) >60 mL/min    GFR Comment          GFR Staging NOT REPORTED    Brain Natriuretic Peptide   Result Value Ref Range    Pro-BNP 2,291 (H) <300 pg/mL    BNP Interpretation Pro-BNP Reference Range:    Troponin   Result Value Ref Range    Troponin, High Sensitivity 18 (H) 0 - 14 ng/L    Troponin T NOT REPORTED <0.03 ng/mL    Troponin Interp NOT REPORTED    TYPE AND SCREEN   Result Value Ref Range    Expiration Date 03/02/2020,2359     Arm Band Number [id number]     ABO/Rh O POSITIVE     Antibody Screen NEGATIVE     Unit Number [id number]     Product Code Leukocyte Reduced Red Cell     Unit Divison 00     Dispense Status ALLOCATED     Transfusion Status OK TO TRANSFUSE     Crossmatch Result COMPATIBLE RADIOLOGY:  XR CHEST STANDARD (2 VW)   Final Result   Cardiomegaly      No acute pulmonary abnormality                 EKG  None    All EKG's are interpreted by the Emergency Department Physician who either signs or Co-signs this chart in the absence of a cardiologist.    EMERGENCY DEPARTMENT COURSE:  Patient is 75-year-old female presenting due to low hemoglobin on outpatient labs drawn yesterday notified that her hemoglobin was \"5 \". Patient does have hypertension no headaches vision change or focal deficit. Patient with chronic lower extremity swelling actually improved recently. Brief episode of chest pain while she was coming to the hospital, appears slightly tachypneic and is on oxygen however was never hypoxic more for symptom. No abdominal pain no melena history, no longer is having vaginal bleeding, history of chronic anemia. Will check CBC BMP troponin EKG chest x-ray, type and screen, if remains anemic with hemoglobin less than 7 we will plan to transfuse 1 unit PRBC.    5:02 PM patient resulted at 5.5 due to patient's chest pain, shortness of breath feel transfusing patient 1 unit PRBC is warranted at this time. 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 Patient has previously been seen by the radiation oncology at this time patient has not yet followed up outpatient for potential surgical or radiation planning. Discussed with internal medicine history and laboratory work-up discussed, plan for admission. Bridging orders placed. PROCEDURES:  None    CONSULTS:  IP CONSULT TO INTERNAL MEDICINE    CRITICAL CARE:  None    FINAL IMPRESSION      1. Anemia, unspecified type          DISPOSITION / PLAN     DISPOSITION  admit      PATIENT REFERRED TO:  No follow-up provider specified.     DISCHARGE MEDICATIONS:  New Prescriptions    No medications on file       Joe Noble DO  Emergency Medicine Resident    (Please

## 2020-02-28 NOTE — ED NOTES
Pt to ER by EMS c/o SOB and fatigue. Pt sent by PCP after labs were drawn yesterday and pt found to have low hemoglobin. Pt states intermittent chest pain as well w/ an episode en route today. Pt arrives aox4, intermittently labored rr and denies pain @ this time. Pt on o2 @ 3L, denies o2 @ home. Workup started and resident @ bedside. Will continue to monitor.      Melisa Fajardo RN  02/28/20 [id number]

## 2020-02-28 NOTE — ED NOTES
Bed: 23  Expected date:   Expected time:   Means of arrival:   Comments:  Chalo Almeida 34, RN  02/28/20 1976

## 2020-02-29 VITALS
SYSTOLIC BLOOD PRESSURE: 166 MMHG | OXYGEN SATURATION: 99 % | WEIGHT: 293 LBS | TEMPERATURE: 98.1 F | HEART RATE: 87 BPM | HEIGHT: 63 IN | RESPIRATION RATE: 16 BRPM | DIASTOLIC BLOOD PRESSURE: 52 MMHG | BODY MASS INDEX: 51.91 KG/M2

## 2020-02-29 LAB
ABO/RH: NORMAL
ABSOLUTE EOS #: 0 K/UL (ref 0–0.4)
ABSOLUTE IMMATURE GRANULOCYTE: 0 K/UL (ref 0–0.3)
ABSOLUTE LYMPH #: 1 K/UL (ref 1–4.8)
ABSOLUTE MONO #: 0.36 K/UL (ref 0.1–0.8)
ANION GAP SERPL CALCULATED.3IONS-SCNC: 12 MMOL/L (ref 9–17)
ANTIBODY SCREEN: NEGATIVE
ARM BAND NUMBER: NORMAL
BASOPHILS # BLD: 0 % (ref 0–2)
BASOPHILS ABSOLUTE: 0 K/UL (ref 0–0.2)
BLD PROD TYP BPU: NORMAL
BLD PROD TYP BPU: NORMAL
BUN BLDV-MCNC: 14 MG/DL (ref 8–23)
BUN/CREAT BLD: ABNORMAL (ref 9–20)
CALCIUM SERPL-MCNC: 8.6 MG/DL (ref 8.6–10.4)
CHLORIDE BLD-SCNC: 101 MMOL/L (ref 98–107)
CO2: 23 MMOL/L (ref 20–31)
CREAT SERPL-MCNC: 0.99 MG/DL (ref 0.5–0.9)
CROSSMATCH RESULT: NORMAL
CROSSMATCH RESULT: NORMAL
DIFFERENTIAL TYPE: ABNORMAL
DISPENSE STATUS BLOOD BANK: NORMAL
DISPENSE STATUS BLOOD BANK: NORMAL
EOSINOPHILS RELATIVE PERCENT: 0 % (ref 1–4)
EXPIRATION DATE: NORMAL
FERRITIN: 7 UG/L (ref 13–150)
GFR AFRICAN AMERICAN: >60 ML/MIN
GFR NON-AFRICAN AMERICAN: 55 ML/MIN
GFR SERPL CREATININE-BSD FRML MDRD: ABNORMAL ML/MIN/{1.73_M2}
GFR SERPL CREATININE-BSD FRML MDRD: ABNORMAL ML/MIN/{1.73_M2}
GLUCOSE BLD-MCNC: 110 MG/DL (ref 70–99)
HCT VFR BLD CALC: 26.3 % (ref 36.3–47.1)
HCT VFR BLD CALC: 26.3 % (ref 36.3–47.1)
HEMOGLOBIN: 6.9 G/DL (ref 11.9–15.1)
HEMOGLOBIN: 6.9 G/DL (ref 11.9–15.1)
IMMATURE GRANULOCYTES: 0 %
IRON SATURATION: 20 % (ref 20–55)
IRON: 64 UG/DL (ref 37–145)
LYMPHOCYTES # BLD: 11 % (ref 24–44)
MCH RBC QN AUTO: 18.6 PG (ref 25.2–33.5)
MCHC RBC AUTO-ENTMCNC: 26.2 G/DL (ref 28.4–34.8)
MCV RBC AUTO: 70.9 FL (ref 82.6–102.9)
MONOCYTES # BLD: 4 % (ref 1–7)
MORPHOLOGY: ABNORMAL
NRBC AUTOMATED: 0 PER 100 WBC
PDW BLD-RTO: 24.5 % (ref 11.8–14.4)
PLATELET # BLD: 343 K/UL (ref 138–453)
PLATELET ESTIMATE: ABNORMAL
PMV BLD AUTO: 9.4 FL (ref 8.1–13.5)
POTASSIUM SERPL-SCNC: 4 MMOL/L (ref 3.7–5.3)
RBC # BLD: 3.71 M/UL (ref 3.95–5.11)
RBC # BLD: ABNORMAL 10*6/UL
SEG NEUTROPHILS: 85 % (ref 36–66)
SEGMENTED NEUTROPHILS ABSOLUTE COUNT: 7.74 K/UL (ref 1.8–7.7)
SODIUM BLD-SCNC: 136 MMOL/L (ref 135–144)
TOTAL IRON BINDING CAPACITY: 318 UG/DL (ref 250–450)
TRANSFUSION STATUS: NORMAL
TRANSFUSION STATUS: NORMAL
UNIT DIVISION: 0
UNIT DIVISION: 0
UNIT NUMBER: NORMAL
UNIT NUMBER: NORMAL
UNSATURATED IRON BINDING CAPACITY: 254 UG/DL (ref 112–347)
WBC # BLD: 9.1 K/UL (ref 3.5–11.3)
WBC # BLD: ABNORMAL 10*3/UL

## 2020-02-29 PROCEDURE — 99222 1ST HOSP IP/OBS MODERATE 55: CPT | Performed by: INTERNAL MEDICINE

## 2020-02-29 PROCEDURE — 36415 COLL VENOUS BLD VENIPUNCTURE: CPT

## 2020-02-29 PROCEDURE — 83540 ASSAY OF IRON: CPT

## 2020-02-29 PROCEDURE — 2580000003 HC RX 258: Performed by: STUDENT IN AN ORGANIZED HEALTH CARE EDUCATION/TRAINING PROGRAM

## 2020-02-29 PROCEDURE — 82728 ASSAY OF FERRITIN: CPT

## 2020-02-29 PROCEDURE — 83550 IRON BINDING TEST: CPT

## 2020-02-29 PROCEDURE — 85014 HEMATOCRIT: CPT

## 2020-02-29 PROCEDURE — 85018 HEMOGLOBIN: CPT

## 2020-02-29 PROCEDURE — 6370000000 HC RX 637 (ALT 250 FOR IP): Performed by: STUDENT IN AN ORGANIZED HEALTH CARE EDUCATION/TRAINING PROGRAM

## 2020-02-29 PROCEDURE — 85025 COMPLETE CBC W/AUTO DIFF WBC: CPT

## 2020-02-29 PROCEDURE — 80048 BASIC METABOLIC PNL TOTAL CA: CPT

## 2020-02-29 RX ORDER — ALLOPURINOL 100 MG/1
100 TABLET ORAL DAILY
Qty: 30 TABLET | Refills: 3 | Status: SHIPPED | OUTPATIENT
Start: 2020-02-29

## 2020-02-29 RX ORDER — LEVOTHYROXINE SODIUM 0.07 MG/1
75 TABLET ORAL DAILY
Qty: 30 TABLET | Refills: 3 | Status: SHIPPED | OUTPATIENT
Start: 2020-02-29

## 2020-02-29 RX ORDER — FUROSEMIDE 20 MG/1
20 TABLET ORAL DAILY
Qty: 30 TABLET | Refills: 3 | Status: SHIPPED | OUTPATIENT
Start: 2020-02-29

## 2020-02-29 RX ORDER — ATORVASTATIN CALCIUM 10 MG/1
10 TABLET, FILM COATED ORAL DAILY
Qty: 30 TABLET | Refills: 3 | Status: SHIPPED | OUTPATIENT
Start: 2020-02-29

## 2020-02-29 RX ORDER — LANOLIN ALCOHOL/MO/W.PET/CERES
325 CREAM (GRAM) TOPICAL 2 TIMES DAILY WITH MEALS
Qty: 60 TABLET | Refills: 3 | Status: SHIPPED | OUTPATIENT
Start: 2020-02-29

## 2020-02-29 RX ADMIN — AMLODIPINE BESYLATE 10 MG: 10 TABLET ORAL at 08:56

## 2020-02-29 RX ADMIN — FERROUS SULFATE TAB EC 325 MG (65 MG FE EQUIVALENT) 325 MG: 325 (65 FE) TABLET DELAYED RESPONSE at 08:58

## 2020-02-29 RX ADMIN — CLONIDINE HYDROCHLORIDE 0.1 MG: 0.1 TABLET ORAL at 08:55

## 2020-02-29 RX ADMIN — ATORVASTATIN CALCIUM 10 MG: 10 TABLET, FILM COATED ORAL at 09:00

## 2020-02-29 RX ADMIN — CARVEDILOL 25 MG: 25 TABLET, FILM COATED ORAL at 08:55

## 2020-02-29 RX ADMIN — Medication 10 ML: at 08:59

## 2020-02-29 ASSESSMENT — ENCOUNTER SYMPTOMS
ANAL BLEEDING: 0
ABDOMINAL DISTENTION: 0
COLOR CHANGE: 0
SHORTNESS OF BREATH: 0
APNEA: 0
BACK PAIN: 0
COUGH: 0
BLOOD IN STOOL: 0
PHOTOPHOBIA: 0
EYE REDNESS: 0
CHEST TIGHTNESS: 0
CONSTIPATION: 0
EYE DISCHARGE: 1
FACIAL SWELLING: 0
SINUS PRESSURE: 0
NAUSEA: 0
EYE ITCHING: 0
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
## 2020-02-29 NOTE — CARE COORDINATION
Case Management Initial Discharge Plan  Josefina Light,             Met with:patient , grand daughterto discuss discharge plans. Information verified: address, contacts, phone number, , insurance Yes  PCP: Santi Ortega visiting physician  Date of last visit:     Insurance Provider: 2 weeks ago    Discharge Planning    Living Arrangements:  Family Members(ivy lives with her)   Support Systems:  Family Members    Home has 2 stories  4 stairs to climb to get into front door, 15 stairs to climb to reach second floor  Location of bedroom/bathroom in home main    Patient able to perform ADL's:independent    Current Services (outpatient & in home) wants to restart [facility]  DME equipment: walker  DME provider:       Potential Assistance Needed:       Patient agreeable to home care: Yes  Freedom of choice provided:  N/a pt wants [facility] had them in the past    Prior SNF/Rehab Placement and Facility: none  Agreeable to SNF/Rehab: No  [place] of choice provided: n/a   Evaluation: no    Expected Discharge date:     Patient expects to be discharged to:  home  Follow Up Appointment: Best Day/ Time:      Transportation provider: family  Transportation arrangements needed for discharge: No    Readmission Risk              Risk of Unplanned Readmission:        0             Does patient have a readmission risk score greater than 14?: Not calculated  If yes, follow-up appointment must be made within 7 days of discharge. Goals of Care:  To have answers what is wrong      Discharge Plan: Home with grand daughter , referral sen to [address] [place] [address], pcp established through [address] [place] [address] with [address] [place] [address],          Electronically signed by Clarisse Shankar RN on 20 at 8:11 PM

## 2020-02-29 NOTE — H&P
[facility]     Department of Internal Medicine - Staff Internal Medicine Teaching Service          ADMISSION NOTE/HISTORY AND PHYSICAL EXAMINATION   Date: 2/29/2020  Patient Name: Zainab Perez  Date of admission: 2/28/2020  4:33 PM  YOB: 1950  PCP: GENERIC HIGH  History Obtained From:  patient, family member - daughter    [facility]     Chief complaint: Low hemoglobin on home health care checks    HISTORY OF PRESENTING ILLNESS     The patient is a pleasant 68 y.o. female presents with a chief complaint of low hemoglobin. She says she was getting labs drawn by her home health who found that she had a hemoglobin of 5 and told the patient to go the ER. Patient states that she has been having bleeding per vagina for the past 6-8 mths. It started getting worse for the past couple of weeks. She has baseline anemia with hemoglobin ranging from 7-8. She states that she thinks that its her blood pressure pills that are worsening the bleeding and whenever she has bleeding she stops taking her blood pressure pills. She also states that she continues to have her menstrual period, and is currently on her cycle and having worsening in the bleed. She is not on any blood thinners. While she was being transferred to [facility]. Vs she started complaining of some chest pain and shortness of breath which as per the patient and the daughter was more of an anxiety attack as the patient has a history of anxiety. 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 When asked about the diagnosis of cancer patient vehemently denied it, saying there is no way she has a cancer. On 9/2018 on an ultrasound showed multiple fibroids but the patient never followed up. EUA, myomectomy and an endometrial biopsy done on 8/22/2019, pathology report showed adenocarcinoma.  She was not considered a surgical candidate because of obesity and chronic leg edema. She didn't follow up with radiation oncology and hem onc as an outpatient. In the ER her blood pressure was found to be in 714E systolic, no headaches vision change or focal deficit. Sent CBC BMP troponin EKG chest x-ray, type and screen. Patient Hb resulted at 5.5 and due to patient's chest pain, shortness of breath feel transfused 1 unit of PRBC. Review of Systems:  Review of Systems   Constitutional: Negative for activity change, appetite change, chills, fatigue and fever. HENT: Negative for congestion, dental problem, drooling, facial swelling, hearing loss, nosebleeds, postnasal drip and sinus pressure. Eyes: Positive for discharge. Negative for photophobia, redness and itching. Respiratory: Negative for apnea, cough, chest tightness and shortness of breath. Cardiovascular: Positive for leg swelling. Negative for chest pain. Gastrointestinal: Negative for abdominal distention, anal bleeding, blood in stool, constipation and nausea. Endocrine: Negative for cold intolerance, heat intolerance and polyphagia. Genitourinary: Negative for difficulty urinating, dysuria, enuresis, flank pain, frequency and hematuria. Musculoskeletal: Negative for arthralgias, back pain, joint swelling, myalgias and neck stiffness. Skin: Negative for color change, pallor and wound. Allergic/Immunologic: Positive for food allergies. Negative for environmental allergies. Neurological: Negative for dizziness, facial asymmetry, light-headedness and numbness. Hematological: Negative for adenopathy. Does not bruise/bleed easily. Psychiatric/Behavioral: Positive for agitation. Negative for behavioral problems, confusion and decreased concentration.            PAST MEDICAL HISTORY     Past Medical History:   Diagnosis Date    SAM (acute kidney injury) ([facility] 9/23/2018    Endometrial carcinoma ([facility]     Heart attack ([facility] 1982    HTN (hypertension) Take 10 mg by mouth daily    Historical Provider, MD       SOCIAL HISTORY     Social History     Socioeconomic History    Marital status: Single     Spouse name: Not on file    Number of children: Not on file    Years of education: Not on file    Highest education level: Not on file   Occupational History    Not on file   Social Needs    Financial resource strain: Not on file    Food insecurity:     Worry: Not on file     Inability: Not on file    Transportation needs:     Medical: Not on file     Non-medical: Not on file   Tobacco Use    Smoking status: Former Smoker     Types: Cigarettes     Last attempt to quit: 2014     Years since quittin.4    Smokeless tobacco: Former User   Substance and Sexual Activity    Alcohol use: No    Drug use: No    Sexual activity: Never   Lifestyle    Physical activity:     Days per week: Not on file     Minutes per session: Not on file    Stress: Not on file   Relationships    Social connections:     Talks on phone: Not on file     Gets together: Not on file     Attends Voodoo service: Not on file     Active member of club or organization: Not on file     Attends meetings of clubs or organizations: Not on file     Relationship status: Not on file    Intimate partner violence:     Fear of current or ex partner: Not on file     Emotionally abused: Not on file     Physically abused: Not on file     Forced sexual activity: Not on file   Other Topics Concern    Not on file   Social History Narrative    Not on file         FAMILY HISTORY     Family History   Adopted: Yes   Problem Relation Age of Onset    Cancer Mother        PHYSICAL EXAM     Vitals: BP (!) 165/70   Pulse 84   Temp 98.8 °F (37.1 °C) (Oral)   Resp 18   Ht 5' 3\" (1.6 m)   Wt (!) 304 lb (137.9 kg)   SpO2 99%   BMI 53.85 kg/m²   Tmax: Temp (24hrs), Av.5 °F (36.9 °C), Min:98 °F (36.7 °C), Max:98.8 °F (37.1 °C)    Last Body weight:   Wt Readings from Last 3 Encounters:   20 (!) 304 lb (137.9 kg)   10/04/19 (!) 342 lb 4 oz (155.2 kg)   09/13/19 (!) 342 lb 6 oz (155.3 kg)     Body Mass Index : Body mass index is 53.85 kg/m². PHYSICAL EXAMINATION:  Physical Exam  Constitutional:       Appearance: She is obese. HENT:      Head: Normocephalic and atraumatic. Nose: Nose normal. No congestion or rhinorrhea. Mouth/Throat:      Mouth: Mucous membranes are moist.      Pharynx: No oropharyngeal exudate or posterior oropharyngeal erythema. Eyes:      Extraocular Movements: Extraocular movements intact. Pupils: Pupils are equal, round, and reactive to light. Neck:      Musculoskeletal: Normal range of motion and neck supple. Cardiovascular:      Rate and Rhythm: Normal rate and regular rhythm. Pulses: Normal pulses. Heart sounds: Normal heart sounds. No murmur. Pulmonary:      Effort: Pulmonary effort is normal. No respiratory distress. Breath sounds: Normal breath sounds. No stridor. No wheezing. Abdominal:      General: Abdomen is flat. There is no distension. Palpations: Abdomen is soft. Tenderness: There is no abdominal tenderness. Musculoskeletal: Normal range of motion. General: Swelling present. No tenderness. Right lower leg: Edema present. Left lower leg: Edema present. Lymphadenopathy:      Cervical: No cervical adenopathy. Skin:     General: Skin is warm and dry. Coloration: Skin is not jaundiced. Findings: No bruising or erythema. Neurological:      General: No focal deficit present. Mental Status: She is alert and oriented to person, place, and time. Cranial Nerves: No cranial nerve deficit. Sensory: No sensory deficit. Psychiatric:         Mood and Affect: Mood normal.         Behavior: Behavior normal.         Thought Content:  Thought content normal.         Judgment: Judgment normal.                INVESTIGATIONS     Laboratory Testing:     Recent Results (from the past 24 hour(s))   CBC WITH AUTO DIFFERENTIAL    Collection Time: 02/28/20  4:46 PM   Result Value Ref Range    WBC 8.7 3.5 - 11.3 k/uL    RBC 3.35 (L) 3.95 - 5.11 m/uL    Hemoglobin 5.5 (LL) 11.9 - 15.1 g/dL    Hematocrit 22.1 (L) 36.3 - 47.1 %    MCV 66.0 (L) 82.6 - 102.9 fL    MCH 16.4 (L) 25.2 - 33.5 pg    MCHC 24.9 (L) 28.4 - 34.8 g/dL    RDW 21.2 (H) 11.8 - 14.4 %    Platelets 254 258 - 989 k/uL    MPV 9.6 8.1 - 13.5 fL    NRBC Automated 0.2 (H) 0.0 per 100 WBC    Differential Type NOT REPORTED     WBC Morphology NOT REPORTED     RBC Morphology NOT REPORTED     Platelet Estimate NOT REPORTED     Immature Granulocytes 1 (H) 0 %    Seg Neutrophils 75 (H) 36 - 65 %    Lymphocytes 15 (L) 24 - 43 %    Monocytes 7 3 - 12 %    Eosinophils % 2 1 - 4 %    Basophils 0 0 - 2 %    Absolute Immature Granulocyte 0.09 0.00 - 0.30 k/uL    Segs Absolute 6.52 1.50 - 8.10 k/uL    Absolute Lymph # 1.31 1.10 - 3.70 k/uL    Absolute Mono # 0.61 0.10 - 1.20 k/uL    Absolute Eos # 0.17 0.00 - 0.44 k/uL    Basophils Absolute 0.00 0.00 - 0.20 k/uL    Morphology MICROCYTOSIS PRESENT     Morphology ANISOCYTOSIS PRESENT     Morphology HYPOCHROMIA PRESENT    BASIC METABOLIC PANEL    Collection Time: 02/28/20  4:46 PM   Result Value Ref Range    Glucose 101 (H) 70 - 99 mg/dL    BUN 16 8 - 23 mg/dL    CREATININE 1.10 (H) 0.50 - 0.90 mg/dL    Bun/Cre Ratio NOT REPORTED 9 - 20    Calcium 8.8 8.6 - 10.4 mg/dL    Sodium 135 135 - 144 mmol/L    Potassium 4.2 3.7 - 5.3 mmol/L    Chloride 100 98 - 107 mmol/L    CO2 23 20 - 31 mmol/L    Anion Gap 12 9 - 17 mmol/L    GFR Non-African American 49 (L) >60 mL/min    GFR  59 (L) >60 mL/min    GFR Comment          GFR Staging NOT REPORTED    Brain Natriuretic Peptide    Collection Time: 02/28/20  4:46 PM   Result Value Ref Range    Pro-BNP 2,291 (H) <300 pg/mL    BNP Interpretation Pro-BNP Reference Range:    Troponin    Collection Time: 02/28/20  4:46 PM   Result Value Ref Range    Troponin, High hem onc and radiation oncology recommendations. Hypothyroidism-on Levothyroxine 75 mcg    Patient not taking any of her medications, says she will only take blood transfusions     In the morning patient was educated about the need to follow up with hem onc and radiation oncology. Patient refused. Attending then asked the patient in front of the daughter if she would consider hospice to avoid re admission, patient refused and said she just wants to go home and if her condition deteriorates her daughter would make all the decisions         DVT ppx: EPC cuffs  GI ppx: on protonix     PT/OT/SW onboard  Discharge Planning:as per      Cecile Johnston MD  Internal Medicine Resident, PGY-1   [facility] [address]  [place], [place]  2/29/2020, 1:21 AM

## 2020-02-29 NOTE — ED NOTES
Report given to RN on floor. Pt in NAD w/ rr even and unlabored. No change in condtiion. Writer to do lab redraw and transport pt.      Tapan Burrell RN  02/28/20 2050

## 2020-02-29 NOTE — CARE COORDINATION
Discharge [facility] Case Management Department  Written by: Jake Wynn RN    Patient Name: Meggan Gen  Attending Provider: No att. providers found  Admit Date: 2020  4:33 PM  MRN: [id number]  Account: [de-identified]                     : 1950  Discharge Date: 2020      Disposition: home with [facility], [place]

## 2020-02-29 NOTE — CARE COORDINATION
Transitional planning. Confirmed with Sallie Shafer with [facility] that they have referral and accepted pt. (pt has used their services in the past). [address] with [facility] that [facility]. order is in and EDDY is complete. They will gather what they need in Epic and call pt for start of care.

## 2020-02-29 NOTE — FLOWSHEET NOTE
Assessment: Patient was sitting up in her bed when  visited. Family was present and open to spiritual care. Patient was in good spirit and seemed to be doing well. When asked how she was feeling, patient responded; \"okay. I am going home today. \" Patient said she used to be Anglican. Intervention:  maintained listening presence, offered support and prayed with patient. Family participated in the prayer. Outcome: Patient and family expressed appreciation for the spiritual support they received. Chaplains would continue to remain available for more prayers and support as needed. 02/29/20 1027   Encounter Summary   Services provided to: Patient and family together   Support System Family members   Place of [address] [place] [address] Avenue Vis[facility]   (02/29/2020)   Complexity of Encounter Moderate   Length of Encounter 15 minutes   Spiritual Assessment Completed Yes   Routine   Type Initial   Spiritual/Gnosticist   Type Spiritual support   Assessment Calm; Approachable; Hopeful   Intervention Active listening;Nurtured hope;Prayer   Outcome Expressed gratitude

## 2020-02-29 NOTE — DISCHARGE INSTR - COC
Continuity of Care Form    Patient Name: Trang Claire   :  1950  MRN:  [id number]    Admit date:  2020  Discharge date:  2020    Code Status Order: Prior   Advance Directives:     Admitting Physician:  Sole Luong DO  PCP: Courtney MEDINA    Discharging Nurse: [facility] Unit/Room#:   Discharging Unit Phone Number: [phone]    Emergency Contact:   Extended Emergency Contact Information  Primary Emergency Contact: [facility] *hipaaRusty   [facility] [address] Phone: [phone]  Relation: Grandchild  Secondary Emergency Contact: Jareth Ye   [facility] [address] Phone: [phone]  Relation: Child    Past Surgical History:  Past Surgical History:   Procedure Laterality Date    CERVIX BIOPSY N/A 2019    ENDOMETRIAL BIOPSY performed by Cheyenne Ware MD at [place] [address]  2019     PELVIC EAU, DILATATION AND CURETTAGE, CYSTOSCOPY    DILATION AND CURETTAGE OF UTERUS N/A 2019    PELVIC EAU, DILATATION AND CURETTAGE, CYSTOSCOPY, PROCTOSIGMOIDOSCOPY, CERVICAL MYOMECTOMY performed by Cheyenne Ware MD at [address] [place] [address]  2019    MYOMECTOMY  2019    CERVICAL MYOMECTOMY     OH OFFICE/OUTPT VISIT,PROCEDURE ONLY Bilateral 2018    SHARP EXCISIONAL DEBRIDEMENT BILATERAL LOWER EXTREMITIES ULCERS X4 WITH SCALPEL AND FORCEPS performed by Ezra Kenney DO at [address] [place] [address]  2019    PROCTOSIGMOIDOSCOPY    TUBAL LIGATION  1974       Immunization History:   Immunization History   Administered Date(s) Administered    Influenza Virus Vaccine 10/13/2018    Pneumococcal Conjugate 13-valent (Karle Backer) 10/13/2018       Active Problems:  Patient Active Problem List   Diagnosis Code    Multiple and open wound of lower limb, complicated, unspecified laterality, subsequent encounter S81.809D    Blood loss anemia D50.0    SAM (acute kidney injury) ([facility] N17.9    Bilateral leg pain M79.604, M79.605    Acute renal failure (HCC) N17.9    Hypoalbuminemia E88.09    Heart attack ([facility] I21.9    Infected skin ulcer limited to breakdown of skin ([facility] [address] L98.491, L08.9    Iron deficiency anemia D50.9    Bandemia D72.825    Multiple superficial wounds with infection T07. Carolynn Chris, L08.9    Severe protein-calorie malnutrition ([facility] E43    Morbid obesity ([facility] E66.01    Ambulatory dysfunction R26.2    Abdominal pain R10.9    Infected wound T14. 8XXA, L08.9    Nonhealing ulcer of multiple sites of right lower extremity ([facility] L97.919    HTN (hypertension) I10    Lymphedema of both lower extremities I89.0    CKD (chronic kidney disease) N18.9    Abnormal uterine bleeding N93.9    Leg ulcer, unspecified laterality, limited to breakdown of skin ([facility]) L97.901    Enlarged fibroid uterus D25.9    Acute anemia D64.9    8/22/19 Pelvic EUA, Pap smear, D&C, Cystoscopy, proctosigmoidoscopy, Cervical myomectomy, EMBx  Z98.890    Pedunculated cervical fibroid D25.9    Multiple open wounds of lower extremity, right, initial encounter S81.801A    Endometrial carcinoma (HCC) C54.1    Primary serous adenocarcinoma of endometrium (HCC) C54.1    Pyoderma gangrenosum L88       Isolation/Infection:   Isolation          No Isolation        Patient Infection Status     None to display          Nurse Assessment:  Last Vital Signs: BP (!) 200/71   Pulse 95   Temp 98.3 °F (36.8 °C) (Oral)   Resp 17   SpO2 98%     Last documented pain score (0-10 scale):    Last Weight:   Wt Readings from Last 1 Encounters:   10/04/19 (!) 342 lb 4 oz (155.2 kg)     Mental Status:  alert    IV Access:  - None    Nursing Mobility/ADLs:  Walking   Assisted  Transfer  Assisted  Bathing  Assisted  Dressing  Assisted  Toileting  Assisted  Feeding  Independent  Med Admin  Assisted  Med Delivery   whole    Wound Care Documentation and Therapy:  Wound 09/23/18 Other (Comment) Thigh Inner;Left nickel sized circular wound (Active) Risk of Unplanned Readmission:        0           Discharging to Facility/ Agency   Name: [facility]          [address] [place] [address]       Phone: [phone]       Fax: [phone]            Dialysis Facility (if applicable)   · Name:  · Address:  · Dialysis Schedule:  · Phone:  · Fax:    / signature: Electronically signed by Alissa Black RN on 2/29/20 at 10:39 AM    PHYSICIAN SECTION    Prognosis: Fair    Condition at Discharge: Stable    Rehab Potential (if transferring to Rehab): Fair    Recommended Labs or Other Treatments After Discharge: Home with home care    Physician Certification: I certify the above information and transfer of Jovana Jerez  is necessary for the continuing treatment of the diagnosis listed and that she requires Home Care for greater 30 days.      Update Admission H&P: No change in H&P    PHYSICIAN SIGNATURE:  Electronically signed by Bibi Colunga DO on 2/29/20 at 11:11 AM

## 2020-02-29 NOTE — PROGRESS NOTES
Attending Physician Statement  I have discussed the care of Josefina Light, including pertinent history and exam findings,  with the pulmonary critical care fellow/resident/CNP. I have seen and examined the patient and the key elements of all parts of the encounter have been performed by me. I agree with the assessment, plan and orders as documented by the fellow/resident/CNP. Well-known to GYN oncology. History of advanced endometrial carcinoma sent to the hospital by her home caregiver for evaluation and management of severe, acute blood loss anemia. History of chronic vaginal bleeding secondary to malignancy. Patient has consistently refused further evaluation or treatment. She did accept transfusion with 2 units of packed red blood cells. Hemoglobin up to 6.9. Currently not symptomatic. Patient denies pain. Exam unremarkable. Not orthostatic. Discussed current situation with patient and her granddaughter (Jose Vela). Advised of advanced malignancy, therapeutic options, including palliative care and hospice. Patient states \"I do not want to talk about it. \"  Patient states that she wishes to go home. Will arrange for outpatient home care.
Transfuse if hemoglobin less than 7.    2. OB/GYN evaluation for vaginal bleeding. 3. Follow with medical oncology and radiation therapy for bleeding control evaluation. Patient was recommended to follow with them outpatient but she failed to do so. 4. Restart home blood pressure medications. IV labetalol for systolic blood pressure more than 160  5. Check iron panel. 6. DVT prophylaxis -EPC cuffs.     Heather Jackson  Department of Internal medicine   [address] [place] [address]

## 2020-02-29 NOTE — PLAN OF CARE
Problem: Falls - Risk of:  Goal: Will remain free from falls  Description  Will remain free from falls  Outcome: Ongoing  Pt remained free from falls this shift. Pt was educated on the use of the call light and the importance of non skid footwear. Pt remains positioned in bed with call light within reach and side rails up x2.    Goal: Absence of physical injury  Description  Absence of physical injury  Outcome: Ongoing

## 2020-03-05 LAB
EKG ATRIAL RATE: 97 BPM
EKG P AXIS: 43 DEGREES
EKG P-R INTERVAL: 148 MS
EKG Q-T INTERVAL: 338 MS
EKG QRS DURATION: 84 MS
EKG QTC CALCULATION (BAZETT): 429 MS
EKG R AXIS: -17 DEGREES
EKG T AXIS: 159 DEGREES
EKG VENTRICULAR RATE: 97 BPM

## 2020-03-06 ENCOUNTER — TELEPHONE (OUTPATIENT)
Dept: ONCOLOGY | Age: 70
End: 2020-03-06

## 2020-04-26 NOTE — DISCHARGE SUMMARY
[address]     Department of Internal Medicine - Staff Internal Medicine Teaching Service    INPATIENT DISCHARGE SUMMARY      Patient Identification:  Zainab Gray is a 68 y.o. female. :  1950  MRN: [id number]     Acct: [de-identified]   PCP: [facility]  Admit Date:  2020  Discharge date and time: 2020 11:20 AM   Attending Provider: Fátima att. providers found                                     [address] Problem Lists:  Principal Problem:    Blood loss anemia  Active Problems:    HTN (hypertension)    CKD (chronic kidney disease)    Acute anemia    19 Pelvic EUA, Pap smear, D&C, Cystoscopy, proctosigmoidoscopy, Cervical myomectomy, EMBx     Endometrial carcinoma (HCC)    Denial (mental defense mechanism)  Resolved Problems:    * No resolved hospital problems. *      HOSPITAL STAY     Brief Inpatient course:   Zainab Gray is a 68 y.o. female who was admitted for the management of Blood loss anemia, presented to the emergency department with history of endometrial carcinoma, CKD stage III, morbid obesity, bilateral lower extremity lymphedema who was sent in by her PCP because she had done some outpatient lab work yesterday which showed hemoglobin at 5. Hemoglobin was repeated today and continues to be around 5.5. Patient does have history of chronic anemia. Patient states she continues to have her menstrual cycles and currently is on her menstrual cycle and has lot of bleeding while passing urine. Patient states her bleeding is worse when she takes her antihypertensive and cholesterol medication. She is not on any blood thinners at home. She received 1 unit of PRBC with hemoglobin improved at 6.7. Receiving her second unit of PRBC. 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   Patient was previously seen by radiation oncology and has not followed up outpatient.     \" On chart review, patient was found to have multiple fibroids in uteri ultrasound retroperitoneal in 9/2018 which was never followed up.  OB/GYN consulted for menorrhagia, pelvic ultrasound on 8/20 showed multiple fibroids. Jagdish Curry, cervical myomectomy and endometrial biopsy performed on 8/22/2019. Cytology showed adenocarcinoma.  Surgical pathology showed high-grade serous carcinoma.  Oncology recommends no hysterectomy as the patient is not a surgical candidate.  Need follow-up with oncology on outpatient for radiotherapy/chemotherapy. \". 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 Patient not a good surgical candidate, awaiting hem onc and radiation oncology recommendations.     Hypothyroidism-on Levothyroxine 75 mcg     Patient not taking any of her medications, says she will only take blood transfusions      In the morning patient was educated about the need to follow up with hem onc and radiation oncology. Patient refused.  Attending then asked the patient in front of the daughter if she would consider hospice to avoid re admission, patient refused and said she just wants to go home and if her condition deteriorates her daughter would make all the decisions     Procedures/ Significant Interventions:    None    Consults:     Consults:     Final Specialist Recommendations/Findings:         Any Hospital Acquired Infections: none    Discharge Functional Status:  stable    DISCHARGE PLAN     Disposition: home    Patient Instructions:   Discharge Medication List as of 2/29/2020 11:06 AM      CONTINUE these medications which have CHANGED    Details   atorvastatin (LIPITOR) 10 MG tablet Take 1 tablet by mouth daily, Disp-30 tablet, R-3Normal      ferrous sulfate 325 (65 Fe) MG EC tablet Take 1 tablet by mouth 2 times daily (with meals), Disp-60 tablet, R-3Normal      allopurinol (ZYLOPRIM) 100 MG tablet Take 1 tablet by mouth daily, Disp-30 tablet, R-3Normal      levothyroxine (SYNTHROID) 75 MCG tablet Take 1 tablet by mouth Daily, Disp-30 tablet, R-3Normal      furosemide (LASIX) 20 MG tablet Take 1 tablet by mouth daily, Disp-30 tablet, R-3Normal         CONTINUE these medications which have NOT CHANGED    Details   gabapentin (NEURONTIN) 300 MG capsule Take 1 capsule by mouth 3 times daily for 30 days. Take 300mg po nightly for 3 days, then 300mg po bid for 3 days, then 300mg po tid., Disp-90 capsule, R-0Normal      zinc sulfate (ZINCATE) 220 (50 Zn) MG capsule Take 1 capsule by mouth daily, Disp-30 capsule, R-3OTC      vitamin C (ASCORBIC ACID) 500 MG tablet Take 1 tablet by mouth daily, Disp-30 tablet, R-3Normal      cloNIDine (CATAPRES) 0.1 MG tablet Take 0.1 mg by mouth 2 times dailyHistorical Med      amLODIPine (NORVASC) 10 MG tablet Take 10 mg by mouth dailyHistorical Med      carvedilol (COREG) 25 MG tablet Take 1 tablet by mouth 2 times daily (with meals), Disp-60 tablet, R-3Normal      pantoprazole (PROTONIX) 40 MG tablet Take 1 tablet by mouth daily, Disp-30 tablet, R-3Normal             Activity: activity as tolerated    Diet: regular diet    Follow-up:    [facility]  NO FAMILY DOC ONLY            Patient Instructions: None  Follow up labs: None  Follow up imaging: None    Note that over 30 minutes was spent in preparing discharge papers, discussing discharge with patient, medication review, etc.      Jose Luis Huber MD, MD  Internal Medicine Resident, PGY-1  [address] [facility]  4/26/2020, 1:23 PM

## 2020-11-03 PROBLEM — N17.9 AKI (ACUTE KIDNEY INJURY) (HCC): Status: RESOLVED | Noted: 2018-09-23 | Resolved: 2020-11-03

## 2024-10-29 NOTE — PROGRESS NOTES
Faustina Zarate presents today for   Chief Complaint   Patient presents with    Gastroesophageal Reflux    Hypertension    Discuss Labs       Is someone accompanying this pt? no    Is the patient using any DME equipment during OV? no    Depression Screening:      10/29/2024    11:05 AM 6/28/2024    10:54 AM 2/22/2021    10:11 AM   PHQ-9 Questionaire   Little interest or pleasure in doing things 0 0 0   Feeling down, depressed, or hopeless 0 0 0   PHQ-9 Total Score 0 0 0        SULEIMAN 7-Anxiety        No data to display                 Travel Screening:    Travel Screening       Question Response    Have you been in contact with someone who was sick? No / Unsure    Do you have any of the following new or worsening symptoms? None of these    Have you traveled internationally or domestically in the last month? No          Travel History   Travel since 09/29/24    No documented travel since 09/29/24          Health Maintenance reviewed and discussed and ordered per Provider.  Transportation Needs: Unknown (10/29/2024)    PRAPARE - Transportation     Lack of Transportation (Medical): Not on file     Lack of Transportation (Non-Medical): No      Food Insecurity: No Food Insecurity (10/29/2024)    Hunger Vital Sign     Worried About Running Out of Food in the Last Year: Never true     Ran Out of Food in the Last Year: Never true     Financial Resource Strain: Low Risk  (10/29/2024)    Overall Financial Resource Strain (CARDIA)     Difficulty of Paying Living Expenses: Not hard at all     Housing Stability: Unknown (10/29/2024)    Housing Stability Vital Sign     Unable to Pay for Housing in the Last Year: Not on file     Number of Times Moved in the Last Year: Not on file     Homeless in the Last Year: No       Did you provide resources if patient requested them? Yes patient declined       Health Maintenance Due   Topic Date Due    HIV screen  Never done    Hepatitis B vaccine (1 of 3 - 19+ 3-dose series) Never done     alert, and in no apparent distress  Head:  Normocephalic, no trauma  Eyes: Pupils equal, round, reactive to light and accommodation; extraocular movements intact; sclera anicteric; conjunctivae pink. No embolic phenomena. ENT: Oropharynx clear, without erythema, exudate, or thrush. No tenderness of sinuses. Mouth/throat: mucosa pink and moist. No lesions. Dentition in good repair. Neck:Supple, without lymphadenopathy. Thyroid normal, No bruits. Pulmonary/Chest: Clear to auscultation, without wheezes, rales, or rhonchi. No dullness to percussion. Cardiovascular: Regular rate and rhythm without murmurs, rubs, or gallops. Abdomen: Soft, non tender. Bowel sounds normal. No organomegaly  All four Extremities: No cyanosis, clubbing, edema, or effusions. Neurologic: No gross sensory or motor deficits. Skin: BLE chronic wounds with serous drainage    Medical Decision Making -Laboratory:   I have independently reviewed/ordered the following labs:    CBC with Differential:   Recent Labs     08/20/19  0507 08/20/19 2008 08/21/19  0458   WBC 9.7  --  9.2   HGB 7.6* 7.1* 6.9*   HCT 27.3* 26.7* 25.8*     --  239   LYMPHOPCT 10*  --  10*   MONOPCT 6  --  5     BMP:   Recent Labs     08/20/19  0507 08/21/19  0458    139   K 4.3 3.9    101   CO2 24 26   BUN 17 16   CREATININE 1.23* 1.26*     Hepatic Function Panel: No results for input(s): PROT, LABALBU, BILIDIR, IBILI, BILITOT, ALKPHOS, ALT, AST in the last 72 hours. No results for input(s): RPR in the last 72 hours. No results for input(s): HIV in the last 72 hours. No results for input(s): BC in the last 72 hours.   Lab Results   Component Value Date    MUCUS NOT REPORTED 08/20/2019    RBC 3.24 08/21/2019    TRICHOMONAS NOT REPORTED 08/20/2019    WBC 9.2 08/21/2019    YEAST NOT REPORTED 08/20/2019    TURBIDITY CLOUDY 08/20/2019     Lab Results   Component Value Date    CREATININE 1.26 08/21/2019    GLUCOSE 96 08/21/2019       Medical Decision

## (undated) DEVICE — SOLUTION SCRB 4OZ 4% CHG H2O AIDED FOR PREOPERATIVE SKIN

## (undated) DEVICE — GLOVE ORANGE PI 7   MSG9070

## (undated) DEVICE — TUBE IRRIG HNDPC HI FLO TP INTRPULS W/SUCTION TUBE

## (undated) DEVICE — GLOVE SURG SZ 8 L12IN FNGR THK79MIL GRN LTX FREE

## (undated) DEVICE — BLADE CLIPPER GEN PURP NS

## (undated) DEVICE — CONTAINER,SPECIMEN,4OZ,OR STRL: Brand: MEDLINE

## (undated) DEVICE — PACK PROCEDURE SURG GEN MIN SVMMC

## (undated) DEVICE — LEGGINGS, PAIR, 31X48, STERILE: Brand: MEDLINE

## (undated) DEVICE — AGENT HEMSTAT W2XL14IN OXIDIZED REGENERATED CELOS ABSRB FOR

## (undated) DEVICE — TUBING, SUCTION, 9/32" X 20', STRAIGHT: Brand: MEDLINE INDUSTRIES, INC.

## (undated) DEVICE — E-Z CLEAN, NON-STICK, PTFE COATED, ELECTROSURGICAL BLADE ELECTRODE, 6.5 INCH (16.5 CM): Brand: MEGADYNE

## (undated) DEVICE — GLOVE ORANGE PI 8 1/2   MSG9085

## (undated) DEVICE — GLOVE SURG SZ 6 THK91MIL LTX FREE SYN POLYISOPRENE ANTI

## (undated) DEVICE — CATHETER,URETHRAL,REDRUBBER,STRL,16FR: Brand: MEDLINE

## (undated) DEVICE — SPONGE LAP W18XL18IN WHT COT 4 PLY FLD STRUNG RADPQ DISP ST

## (undated) DEVICE — Z DISCONTINUED BY MEDLINE USE 2711682 TRAY SKIN PREP DRY W/ PREM GLV

## (undated) DEVICE — COVER XR CASS W24XL25IN CLR POLY FLM DRAPEABLE W REL LNR

## (undated) DEVICE — GOWN,AURORA,NONRNF,XL,30/CS: Brand: MEDLINE

## (undated) DEVICE — SVMMC GYN MIN PK

## (undated) DEVICE — COVER OR TBL W40XL90IN ABSRB STD AND GRIPPY BK SAHARA

## (undated) DEVICE — GLOVE ORANGE PI 7 1/2   MSG9075

## (undated) DEVICE — KENDALL SCD EXPRESS SLEEVES, KNEE LENGTH, MEDIUM: Brand: KENDALL SCD

## (undated) DEVICE — Z INACTIVE USE 2735373 APPLICATOR FBR LAIN COT WOOD TIP ECONOMICAL

## (undated) DEVICE — GLOVE SURG SZ 65 THK91MIL LTX FREE SYN POLYISOPRENE

## (undated) DEVICE — BANDAGE,GAUZE,BULKEE II,4.5"X4.1YD,STRL: Brand: MEDLINE

## (undated) DEVICE — GLOVE ORANGE PI 8   MSG9080

## (undated) DEVICE — CONTROL SYRINGE LUER-LOCK TIP: Brand: MONOJECT

## (undated) DEVICE — CYSTO/BLADDER IRRIGATION SET, REGULATING CLAMP

## (undated) DEVICE — DUP USE 240185 SOLUTION IV IRRIG WATER 1000ML IRRIG BAG 2B7114

## (undated) DEVICE — APPLICATOR FIBER TIP 16 IN CELLULOSE HD SWAB CHEVRON SCPET

## (undated) DEVICE — DRAPE,REIN 53X77,STERILE: Brand: MEDLINE

## (undated) DEVICE — 1016 S-DRAPE IRRIG POUCH 10/BOX: Brand: STERI-DRAPE™

## (undated) DEVICE — SUTURE CHROMIC GUT SZ 3-0 L27IN ABSRB BRN L26MM SH 1/2 CIR G122H

## (undated) DEVICE — INTENDED FOR TISSUE SEPARATION, AND OTHER PROCEDURES THAT REQUIRE A SHARP SURGICAL BLADE TO PUNCTURE OR CUT.: Brand: BARD-PARKER ® CARBON RIB-BACK BLADES

## (undated) DEVICE — 3M™ DURAPORE™ SURGICAL TAPE 1538-3, 3 INCH X 10 YARD (7,5CM X 9,1M), 4 ROLLS/BOX: Brand: 3M™ DURAPORE™

## (undated) DEVICE — ELECTROSURGICAL PENCIL BUTTON SWITCH E-Z CLEAN COATED BLADE ELECTRODE 10 FT (3 M) CORD HOLSTER: Brand: MEGADYNE

## (undated) DEVICE — COUNTER NDL 10 COUNT HLD 20 FOAM BLK SGL MAG

## (undated) DEVICE — DRESSING PETRO W3XL8IN OIL EMUL N ADH GZ KNIT IMPREG CELOS

## (undated) DEVICE — YANKAUER,FLEXIBLE HANDLE,REGLR CAPACITY: Brand: MEDLINE INDUSTRIES, INC.

## (undated) DEVICE — Z INACTIVE USE 2527070 DRAPE SURG W40XL44IN UNDERBUTTOCK SMS POLYPR W/ PCH BK DISP

## (undated) DEVICE — GOWN,AURORA,NONREINFORCED,LARGE: Brand: MEDLINE

## (undated) DEVICE — GARMENT,MEDLINE,DVT,INT,CALF,MED, GEN2: Brand: MEDLINE

## (undated) DEVICE — PROTECTOR ULN NRV PUR FOAM HK LOOP STRP ANATOMICALLY

## (undated) DEVICE — PAD,NON-ADHERENT,3X8,STERILE,LF,1/PK: Brand: MEDLINE

## (undated) DEVICE — TOWEL,OR,DSP,ST,NATURAL,DLX,4/PK,20PK/CS: Brand: MEDLINE